# Patient Record
Sex: MALE | Race: WHITE
[De-identification: names, ages, dates, MRNs, and addresses within clinical notes are randomized per-mention and may not be internally consistent; named-entity substitution may affect disease eponyms.]

---

## 2017-03-01 PROBLEM — Z00.00 ENCOUNTER FOR PREVENTIVE HEALTH EXAMINATION: Status: ACTIVE | Noted: 2017-03-01

## 2017-03-15 ENCOUNTER — APPOINTMENT (OUTPATIENT)
Dept: OTOLARYNGOLOGY | Facility: CLINIC | Age: 82
End: 2017-03-15

## 2017-03-30 ENCOUNTER — OUTPATIENT (OUTPATIENT)
Dept: OUTPATIENT SERVICES | Facility: HOSPITAL | Age: 82
LOS: 1 days | Discharge: HOME | End: 2017-03-30

## 2017-06-27 DIAGNOSIS — I65.29 OCCLUSION AND STENOSIS OF UNSPECIFIED CAROTID ARTERY: ICD-10-CM

## 2017-08-24 ENCOUNTER — OUTPATIENT (OUTPATIENT)
Dept: OUTPATIENT SERVICES | Facility: HOSPITAL | Age: 82
LOS: 1 days | Discharge: HOME | End: 2017-08-24

## 2017-08-24 DIAGNOSIS — M67.431 GANGLION, RIGHT WRIST: ICD-10-CM

## 2017-12-05 ENCOUNTER — OUTPATIENT (OUTPATIENT)
Dept: OUTPATIENT SERVICES | Facility: HOSPITAL | Age: 82
LOS: 1 days | Discharge: HOME | End: 2017-12-05

## 2017-12-05 DIAGNOSIS — I10 ESSENTIAL (PRIMARY) HYPERTENSION: ICD-10-CM

## 2017-12-05 DIAGNOSIS — E06.3 AUTOIMMUNE THYROIDITIS: ICD-10-CM

## 2017-12-05 DIAGNOSIS — E78.2 MIXED HYPERLIPIDEMIA: ICD-10-CM

## 2019-02-15 ENCOUNTER — INPATIENT (INPATIENT)
Facility: HOSPITAL | Age: 84
LOS: 1 days | Discharge: HOME | End: 2019-02-17
Attending: HOSPITALIST | Admitting: HOSPITALIST
Payer: MEDICARE

## 2019-02-15 VITALS
HEART RATE: 82 BPM | TEMPERATURE: 97 F | RESPIRATION RATE: 16 BRPM | WEIGHT: 160.06 LBS | DIASTOLIC BLOOD PRESSURE: 61 MMHG | HEIGHT: 68 IN | OXYGEN SATURATION: 99 % | SYSTOLIC BLOOD PRESSURE: 133 MMHG

## 2019-02-15 DIAGNOSIS — K52.9 NONINFECTIVE GASTROENTERITIS AND COLITIS, UNSPECIFIED: ICD-10-CM

## 2019-02-15 DIAGNOSIS — E03.9 HYPOTHYROIDISM, UNSPECIFIED: ICD-10-CM

## 2019-02-15 DIAGNOSIS — A41.9 SEPSIS, UNSPECIFIED ORGANISM: ICD-10-CM

## 2019-02-15 DIAGNOSIS — N17.9 ACUTE KIDNEY FAILURE, UNSPECIFIED: ICD-10-CM

## 2019-02-15 LAB
ALBUMIN SERPL ELPH-MCNC: 4.9 G/DL — SIGNIFICANT CHANGE UP (ref 3.5–5.2)
ALP SERPL-CCNC: 93 U/L — SIGNIFICANT CHANGE UP (ref 30–115)
ALT FLD-CCNC: 35 U/L — SIGNIFICANT CHANGE UP (ref 0–41)
ANION GAP SERPL CALC-SCNC: 17 MMOL/L — HIGH (ref 7–14)
AST SERPL-CCNC: 41 U/L — SIGNIFICANT CHANGE UP (ref 0–41)
BASOPHILS # BLD AUTO: 0.02 K/UL — SIGNIFICANT CHANGE UP (ref 0–0.2)
BASOPHILS NFR BLD AUTO: 0.2 % — SIGNIFICANT CHANGE UP (ref 0–1)
BILIRUB DIRECT SERPL-MCNC: <0.2 MG/DL — SIGNIFICANT CHANGE UP (ref 0–0.2)
BILIRUB INDIRECT FLD-MCNC: >0.6 MG/DL — SIGNIFICANT CHANGE UP (ref 0.2–1.2)
BILIRUB SERPL-MCNC: 0.8 MG/DL — SIGNIFICANT CHANGE UP (ref 0.2–1.2)
BUN SERPL-MCNC: 56 MG/DL — HIGH (ref 10–20)
CALCIUM SERPL-MCNC: 9.1 MG/DL — SIGNIFICANT CHANGE UP (ref 8.5–10.1)
CHLORIDE SERPL-SCNC: 95 MMOL/L — LOW (ref 98–110)
CO2 SERPL-SCNC: 24 MMOL/L — SIGNIFICANT CHANGE UP (ref 17–32)
CREAT SERPL-MCNC: 1.9 MG/DL — HIGH (ref 0.7–1.5)
EOSINOPHIL # BLD AUTO: 0.03 K/UL — SIGNIFICANT CHANGE UP (ref 0–0.7)
EOSINOPHIL NFR BLD AUTO: 0.2 % — SIGNIFICANT CHANGE UP (ref 0–8)
FLU A RESULT: NEGATIVE — SIGNIFICANT CHANGE UP
FLU A RESULT: NEGATIVE — SIGNIFICANT CHANGE UP
FLUAV AG NPH QL: NEGATIVE — SIGNIFICANT CHANGE UP
FLUBV AG NPH QL: NEGATIVE — SIGNIFICANT CHANGE UP
GLUCOSE SERPL-MCNC: 110 MG/DL — HIGH (ref 70–99)
HCT VFR BLD CALC: 44.5 % — SIGNIFICANT CHANGE UP (ref 42–52)
HGB BLD-MCNC: 14.7 G/DL — SIGNIFICANT CHANGE UP (ref 14–18)
IMM GRANULOCYTES NFR BLD AUTO: 0.5 % — HIGH (ref 0.1–0.3)
LACTATE SERPL-SCNC: 0.9 MMOL/L — SIGNIFICANT CHANGE UP (ref 0.5–2.2)
LACTATE SERPL-SCNC: 2.1 MMOL/L — SIGNIFICANT CHANGE UP (ref 0.5–2.2)
LIDOCAIN IGE QN: 26 U/L — SIGNIFICANT CHANGE UP (ref 7–60)
LYMPHOCYTES # BLD AUTO: 0.42 K/UL — LOW (ref 1.2–3.4)
LYMPHOCYTES # BLD AUTO: 3.5 % — LOW (ref 20.5–51.1)
MCHC RBC-ENTMCNC: 29.3 PG — SIGNIFICANT CHANGE UP (ref 27–31)
MCHC RBC-ENTMCNC: 33 G/DL — SIGNIFICANT CHANGE UP (ref 32–37)
MCV RBC AUTO: 88.6 FL — SIGNIFICANT CHANGE UP (ref 80–94)
MONOCYTES # BLD AUTO: 0.98 K/UL — HIGH (ref 0.1–0.6)
MONOCYTES NFR BLD AUTO: 8.2 % — SIGNIFICANT CHANGE UP (ref 1.7–9.3)
NEUTROPHILS # BLD AUTO: 10.5 K/UL — HIGH (ref 1.4–6.5)
NEUTROPHILS NFR BLD AUTO: 87.4 % — HIGH (ref 42.2–75.2)
NRBC # BLD: 0 /100 WBCS — SIGNIFICANT CHANGE UP (ref 0–0)
PLATELET # BLD AUTO: 231 K/UL — SIGNIFICANT CHANGE UP (ref 130–400)
POTASSIUM SERPL-MCNC: 4 MMOL/L — SIGNIFICANT CHANGE UP (ref 3.5–5)
POTASSIUM SERPL-SCNC: 4 MMOL/L — SIGNIFICANT CHANGE UP (ref 3.5–5)
PROT SERPL-MCNC: 7.8 G/DL — SIGNIFICANT CHANGE UP (ref 6–8)
RBC # BLD: 5.02 M/UL — SIGNIFICANT CHANGE UP (ref 4.7–6.1)
RBC # FLD: 13.9 % — SIGNIFICANT CHANGE UP (ref 11.5–14.5)
RSV RESULT: NEGATIVE — SIGNIFICANT CHANGE UP
RSV RNA RESP QL NAA+PROBE: NEGATIVE — SIGNIFICANT CHANGE UP
SODIUM SERPL-SCNC: 136 MMOL/L — SIGNIFICANT CHANGE UP (ref 135–146)
TROPONIN T SERPL-MCNC: <0.01 NG/ML — SIGNIFICANT CHANGE UP
WBC # BLD: 12.01 K/UL — HIGH (ref 4.8–10.8)
WBC # FLD AUTO: 12.01 K/UL — HIGH (ref 4.8–10.8)

## 2019-02-15 RX ORDER — METRONIDAZOLE 500 MG
500 TABLET ORAL ONCE
Qty: 0 | Refills: 0 | Status: COMPLETED | OUTPATIENT
Start: 2019-02-15 | End: 2019-02-15

## 2019-02-15 RX ORDER — METRONIDAZOLE 500 MG
500 TABLET ORAL EVERY 8 HOURS
Qty: 0 | Refills: 0 | Status: DISCONTINUED | OUTPATIENT
Start: 2019-02-15 | End: 2019-02-17

## 2019-02-15 RX ORDER — SODIUM CHLORIDE 9 MG/ML
1000 INJECTION INTRAMUSCULAR; INTRAVENOUS; SUBCUTANEOUS ONCE
Qty: 0 | Refills: 0 | Status: COMPLETED | OUTPATIENT
Start: 2019-02-15 | End: 2019-02-15

## 2019-02-15 RX ORDER — SODIUM CHLORIDE 9 MG/ML
1000 INJECTION INTRAMUSCULAR; INTRAVENOUS; SUBCUTANEOUS
Qty: 0 | Refills: 0 | Status: DISCONTINUED | OUTPATIENT
Start: 2019-02-15 | End: 2019-02-16

## 2019-02-15 RX ORDER — SODIUM CHLORIDE 9 MG/ML
1000 INJECTION, SOLUTION INTRAVENOUS
Qty: 0 | Refills: 0 | Status: DISCONTINUED | OUTPATIENT
Start: 2019-02-15 | End: 2019-02-17

## 2019-02-15 RX ORDER — ONDANSETRON 8 MG/1
4 TABLET, FILM COATED ORAL ONCE
Qty: 0 | Refills: 0 | Status: COMPLETED | OUTPATIENT
Start: 2019-02-15 | End: 2019-02-15

## 2019-02-15 RX ORDER — CIPROFLOXACIN LACTATE 400MG/40ML
400 VIAL (ML) INTRAVENOUS DAILY
Qty: 0 | Refills: 0 | Status: DISCONTINUED | OUTPATIENT
Start: 2019-02-15 | End: 2019-02-17

## 2019-02-15 RX ORDER — PANTOPRAZOLE SODIUM 20 MG/1
40 TABLET, DELAYED RELEASE ORAL DAILY
Qty: 0 | Refills: 0 | Status: DISCONTINUED | OUTPATIENT
Start: 2019-02-15 | End: 2019-02-16

## 2019-02-15 RX ORDER — ACETAMINOPHEN 500 MG
975 TABLET ORAL ONCE
Qty: 0 | Refills: 0 | Status: COMPLETED | OUTPATIENT
Start: 2019-02-15 | End: 2019-02-15

## 2019-02-15 RX ORDER — CIPROFLOXACIN LACTATE 400MG/40ML
400 VIAL (ML) INTRAVENOUS EVERY 12 HOURS
Qty: 0 | Refills: 0 | Status: DISCONTINUED | OUTPATIENT
Start: 2019-02-15 | End: 2019-02-15

## 2019-02-15 RX ORDER — CIPROFLOXACIN LACTATE 400MG/40ML
400 VIAL (ML) INTRAVENOUS ONCE
Qty: 0 | Refills: 0 | Status: COMPLETED | OUTPATIENT
Start: 2019-02-15 | End: 2019-02-15

## 2019-02-15 RX ORDER — PANTOPRAZOLE SODIUM 20 MG/1
40 TABLET, DELAYED RELEASE ORAL
Qty: 0 | Refills: 0 | Status: DISCONTINUED | OUTPATIENT
Start: 2019-02-15 | End: 2019-02-16

## 2019-02-15 RX ADMIN — Medication 200 MILLIGRAM(S): at 22:45

## 2019-02-15 RX ADMIN — SODIUM CHLORIDE 250 MILLILITER(S): 9 INJECTION INTRAMUSCULAR; INTRAVENOUS; SUBCUTANEOUS at 17:49

## 2019-02-15 RX ADMIN — Medication 100 MILLIGRAM(S): at 20:13

## 2019-02-15 RX ADMIN — SODIUM CHLORIDE 2000 MILLILITER(S): 9 INJECTION INTRAMUSCULAR; INTRAVENOUS; SUBCUTANEOUS at 20:13

## 2019-02-15 RX ADMIN — Medication 975 MILLIGRAM(S): at 17:49

## 2019-02-15 RX ADMIN — PANTOPRAZOLE SODIUM 40 MILLIGRAM(S): 20 TABLET, DELAYED RELEASE ORAL at 23:03

## 2019-02-15 NOTE — ED PROVIDER NOTE - CARE PLAN
Principal Discharge DX:	Sepsis  Secondary Diagnosis:	Enteritis  Secondary Diagnosis:	EUGENE (acute kidney injury)

## 2019-02-15 NOTE — H&P ADULT - NSHPREVIEWOFSYSTEMS_GEN_ALL_CORE
· Review of Systems: CONSTITUTIONAL: (-) fevers, (-) chills, (-) fatigue, (+) decreased appetite  	EYES: (-) eye redness, (-) eye discharge  	ENT: (-) congestion, (-) rhinorrhea, (-) sore throat  	NECK: (-) neck pain, (-) lymphadenopathy  	CARDIO: (-) chest pain, (-) palpitations, (-) edema  	PULM: (-) cough, (-) sputum, (-) shortness of breath  	GI: see HPI  	: (-) dysuria, (-) hematuria, (-) frequency, (-) urgency, (-) incontinence, (-) difficulty urinating, (-) urinary retention, (-) flank pain  	MSK: (-) back pain, (-) myalgias, (-) gait difficulty  	SKIN: (-) rashes, (-) pallor, (-) jaundice  NEURO: (-) headache, (-) dizziness, (-) lightheadedness, (-) syncope, (+) generalized weakness

## 2019-02-15 NOTE — ED ADULT NURSE NOTE - NS ED NOTE  TALK SOMEONE YN
Take shower after playing outside  Zyrtec 10 mg every day  flonase 1 spray in each side/day after blowing nose  zaditor 1 drop in each 2x/day as needed  Shower after playing outside  Keep bedroom window closed  No ceiling fan  F/u in 2 weeks No

## 2019-02-15 NOTE — ED ADULT NURSE NOTE - CHIEF COMPLAINT QUOTE
BIBA via [facility] from home, pt states, "I think it started off with a virus, I lost control of my legs and I'm pooping my pants, I didn't fall but I let my self easy on the floor because I couldn't get on the bed." NO Head injury, NO LOC, complaining of generalized weakness that began yesterday, and diarrhea x 2 days.

## 2019-02-15 NOTE — ED ADULT TRIAGE NOTE - CHIEF COMPLAINT QUOTE
BIBA via [facility] from home, pt states, "I think it started off with a virus, I lost control of my legs and I'm pooping my pants, I didn't fall but I let my self easy on the floor because I couldn't get on the bed." NO Head injury, NO LOC, complaining of generalized weakness that began yesterday, and diarrhea x 2 days.

## 2019-02-15 NOTE — ED PROVIDER NOTE - OBJECTIVE STATEMENT
85 year old male w hx of HTN, HLD, cholecystectomy presents to the ED with generalized weakness, nonbloody nonbilious emesis and watery nonbloody diarrhea for 2 days. Pt has had up to 5 episodes of diarrhea a day and two episodes of vomiting yesterday. today patient was planning on going to an urgent care for evaluation but felt too weak to get dressed, thus prompting his wife to call EMS. Denies fevers/chills, melena, hematochezia, decreased urine output, dysuria, hematuria, chest pain, cough, shortness of breath, back pain, flank pain. denies recent travel, hospitalizations, or antibiotics.

## 2019-02-15 NOTE — ED PROVIDER NOTE - CLINICAL SUMMARY MEDICAL DECISION MAKING FREE TEXT BOX
vomiting and diarrhea - neg ct - sepsis critiera - hydration and repeat lact sent - admitted and abx given

## 2019-02-15 NOTE — ED PROVIDER NOTE - PHYSICAL EXAMINATION
VITALS:  I have reviewed the initial vital signs.  GENERAL: Well-developed, well-nourished, in no acute distress. Feels warm to the touch.  HEENT: Sclera clear. Conjunctiva pink. EOMI, PERRLA. Mucous membranes moist.  NECK: supple w FROM. No cervical adenopathy.  CARDIO: RRR, nl S1 and S2. No murmurs, rubs, or gallops. 2+ radial pulses bilaterally.  PULM: Normal effort. CTA b/l without wheezes, rales, or rhonchi.  MSK: extremities x4 w FROM.  GI: Normal bowel sounds. Abdomen soft and non-distended. Nontender in all four quadrants without rebound or guarding. No pulsatile masses.  : No CVA tenderness b/l.  SKIN: Warm, dry. Decreased skin turgor. Capillary refill <2 seconds.  NEURO: A&Ox3. Speech clear. Moving all four extremities equally throughout.

## 2019-02-15 NOTE — H&P ADULT - NSHPLABSRESULTS_GEN_ALL_CORE
14.7   12.01 )-----------( 231      ( 15 Feb 2019 17:30 )             44.5     02-15    136  |  95<L>  |  56<H>  ----------------------------<  110<H>  4.0   |  24  |  1.9<H>    Ca    9.1      15 Feb 2019 17:30    TPro  7.8  /  Alb  4.9  /  TBili  0.8  /  DBili  <0.2  /  AST  41  /  ALT  35  /  AlkPhos  93  02-15              Lactate Trend  02-15 @ 19:40 Lactate:0.9   02-15 @ 17:30 Lactate:2.1     CARDIAC MARKERS ( 15 Feb 2019 17:30 )  x     / <0.01 ng/mL / x     / x     / x          CAPILLARY BLOOD GLUCOSE

## 2019-02-15 NOTE — ED PROVIDER NOTE - ATTENDING CONTRIBUTION TO CARE
Pt is a 84yo male with 3d of nonbloody vomiting and diarrhea.  No fever, no travel, no sick contacts, no new foods.  No abdominal pain.  No other complaints.  Today was feeling very weak and couldn't get out of bed.    Exam: pink conjucntivae, soft NT abdomen, cap refill =2s, RRR, CTAB  Imp: gastroenteritis, r/o other pathology  Plan: labs, ua, ct

## 2019-02-15 NOTE — H&P ADULT - HISTORY OF PRESENT ILLNESS
· Chief Complaint: The patient is a 85y Male complaining of weakness.	  · HPI Objective Statement: 85 year old male w hx of HTN, HLD, cholecystectomy presents to the ED with generalized weakness, nonbloody nonbilious emesis and watery nonbloody diarrhea for 2 days. Pt has had up to 5 episodes of diarrhea a day and two episodes of vomiting yesterday. today patient was planning on going to an urgent care for evaluation but felt too weak to get dressed, thus prompting his wife to call EMS. Denies fevers/chills, melena, hematochezia, decreased urine output, dysuria, hematuria, chest pain, cough, shortness of breath, back pain, flank pain. denies recent travel, hospitalizations, or antibiotics.

## 2019-02-15 NOTE — ED PROVIDER NOTE - NS ED ROS FT
CONSTITUTIONAL: (-) fevers, (-) chills, (-) fatigue, (+) decreased appetite  EYES: (-) eye redness, (-) eye discharge  ENT: (-) congestion, (-) rhinorrhea, (-) sore throat  NECK: (-) neck pain, (-) lymphadenopathy  CARDIO: (-) chest pain, (-) palpitations, (-) edema  PULM: (-) cough, (-) sputum, (-) shortness of breath  GI: see HPI  : (-) dysuria, (-) hematuria, (-) frequency, (-) urgency, (-) incontinence, (-) difficulty urinating, (-) urinary retention, (-) flank pain  MSK: (-) back pain, (-) myalgias, (-) gait difficulty  SKIN: (-) rashes, (-) pallor, (-) jaundice  NEURO: (-) headache, (-) dizziness, (-) lightheadedness, (-) syncope, (+) generalized weakness

## 2019-02-15 NOTE — H&P ADULT - ASSESSMENT
Patient is a 85y old  Male who presents with a chief complaint of                                                                                                                                                                                                                                                                                      HEALTH ISSUES - PROBLEM Dx: Patient is a 85y old  Male who presents with a chief complaint of    nausea/ vomitting / diarrhea                                                                                                                                                                                                                                                                                    HEALTH ISSUES - PROBLEM Dx:gastritis/  enteritis

## 2019-02-16 DIAGNOSIS — J94.8 OTHER SPECIFIED PLEURAL CONDITIONS: ICD-10-CM

## 2019-02-16 DIAGNOSIS — M48.50XA COLLAPSED VERTEBRA, NOT ELSEWHERE CLASSIFIED, SITE UNSPECIFIED, INITIAL ENCOUNTER FOR FRACTURE: ICD-10-CM

## 2019-02-16 DIAGNOSIS — K56.600 PARTIAL INTESTINAL OBSTRUCTION, UNSPECIFIED AS TO CAUSE: ICD-10-CM

## 2019-02-16 DIAGNOSIS — E03.9 HYPOTHYROIDISM, UNSPECIFIED: ICD-10-CM

## 2019-02-16 LAB
ANION GAP SERPL CALC-SCNC: 10 MMOL/L — SIGNIFICANT CHANGE UP (ref 7–14)
ANION GAP SERPL CALC-SCNC: 8 MMOL/L — SIGNIFICANT CHANGE UP (ref 7–14)
APPEARANCE UR: CLEAR — SIGNIFICANT CHANGE UP
BACTERIA # UR AUTO: ABNORMAL
BILIRUB UR-MCNC: NEGATIVE — SIGNIFICANT CHANGE UP
BUN SERPL-MCNC: 36 MG/DL — HIGH (ref 10–20)
BUN SERPL-MCNC: 41 MG/DL — HIGH (ref 10–20)
C DIFF BY PCR RESULT: NEGATIVE — SIGNIFICANT CHANGE UP
C DIFF TOX GENS STL QL NAA+PROBE: SIGNIFICANT CHANGE UP
CALCIUM SERPL-MCNC: 7.7 MG/DL — LOW (ref 8.5–10.1)
CALCIUM SERPL-MCNC: 7.7 MG/DL — LOW (ref 8.5–10.1)
CHLORIDE SERPL-SCNC: 103 MMOL/L — SIGNIFICANT CHANGE UP (ref 98–110)
CHLORIDE SERPL-SCNC: 104 MMOL/L — SIGNIFICANT CHANGE UP (ref 98–110)
CO2 SERPL-SCNC: 23 MMOL/L — SIGNIFICANT CHANGE UP (ref 17–32)
CO2 SERPL-SCNC: 23 MMOL/L — SIGNIFICANT CHANGE UP (ref 17–32)
COLOR SPEC: YELLOW — SIGNIFICANT CHANGE UP
CREAT SERPL-MCNC: 1.5 MG/DL — SIGNIFICANT CHANGE UP (ref 0.7–1.5)
CREAT SERPL-MCNC: 1.5 MG/DL — SIGNIFICANT CHANGE UP (ref 0.7–1.5)
DIFF PNL FLD: NEGATIVE — SIGNIFICANT CHANGE UP
GLUCOSE SERPL-MCNC: 85 MG/DL — SIGNIFICANT CHANGE UP (ref 70–99)
GLUCOSE SERPL-MCNC: 93 MG/DL — SIGNIFICANT CHANGE UP (ref 70–99)
GLUCOSE UR QL: NEGATIVE MG/DL — SIGNIFICANT CHANGE UP
GRAN CASTS # UR COMP ASSIST: ABNORMAL /LPF
HCT VFR BLD CALC: 36.4 % — LOW (ref 42–52)
HCT VFR BLD CALC: 38.2 % — LOW (ref 42–52)
HGB BLD-MCNC: 12 G/DL — LOW (ref 14–18)
HGB BLD-MCNC: 12.6 G/DL — LOW (ref 14–18)
KETONES UR-MCNC: NEGATIVE — SIGNIFICANT CHANGE UP
LEUKOCYTE ESTERASE UR-ACNC: NEGATIVE — SIGNIFICANT CHANGE UP
MCHC RBC-ENTMCNC: 29.3 PG — SIGNIFICANT CHANGE UP (ref 27–31)
MCHC RBC-ENTMCNC: 29.6 PG — SIGNIFICANT CHANGE UP (ref 27–31)
MCHC RBC-ENTMCNC: 33 G/DL — SIGNIFICANT CHANGE UP (ref 32–37)
MCHC RBC-ENTMCNC: 33 G/DL — SIGNIFICANT CHANGE UP (ref 32–37)
MCV RBC AUTO: 88.8 FL — SIGNIFICANT CHANGE UP (ref 80–94)
MCV RBC AUTO: 89.7 FL — SIGNIFICANT CHANGE UP (ref 80–94)
NITRITE UR-MCNC: NEGATIVE — SIGNIFICANT CHANGE UP
NRBC # BLD: 0 /100 WBCS — SIGNIFICANT CHANGE UP (ref 0–0)
NRBC # BLD: 0 /100 WBCS — SIGNIFICANT CHANGE UP (ref 0–0)
PH UR: 6 — SIGNIFICANT CHANGE UP (ref 5–8)
PLATELET # BLD AUTO: 158 K/UL — SIGNIFICANT CHANGE UP (ref 130–400)
PLATELET # BLD AUTO: 202 K/UL — SIGNIFICANT CHANGE UP (ref 130–400)
POTASSIUM SERPL-MCNC: 4.1 MMOL/L — SIGNIFICANT CHANGE UP (ref 3.5–5)
POTASSIUM SERPL-MCNC: 4.4 MMOL/L — SIGNIFICANT CHANGE UP (ref 3.5–5)
POTASSIUM SERPL-SCNC: 4.1 MMOL/L — SIGNIFICANT CHANGE UP (ref 3.5–5)
POTASSIUM SERPL-SCNC: 4.4 MMOL/L — SIGNIFICANT CHANGE UP (ref 3.5–5)
PROT UR-MCNC: ABNORMAL MG/DL
RBC # BLD: 4.1 M/UL — LOW (ref 4.7–6.1)
RBC # BLD: 4.26 M/UL — LOW (ref 4.7–6.1)
RBC # FLD: 14.2 % — SIGNIFICANT CHANGE UP (ref 11.5–14.5)
RBC # FLD: 14.2 % — SIGNIFICANT CHANGE UP (ref 11.5–14.5)
SODIUM SERPL-SCNC: 135 MMOL/L — SIGNIFICANT CHANGE UP (ref 135–146)
SODIUM SERPL-SCNC: 136 MMOL/L — SIGNIFICANT CHANGE UP (ref 135–146)
SP GR SPEC: 1.01 — SIGNIFICANT CHANGE UP (ref 1.01–1.03)
UROBILINOGEN FLD QL: 0.2 MG/DL — SIGNIFICANT CHANGE UP (ref 0.2–0.2)
WBC # BLD: 5.33 K/UL — SIGNIFICANT CHANGE UP (ref 4.8–10.8)
WBC # BLD: 5.99 K/UL — SIGNIFICANT CHANGE UP (ref 4.8–10.8)
WBC # FLD AUTO: 5.33 K/UL — SIGNIFICANT CHANGE UP (ref 4.8–10.8)
WBC # FLD AUTO: 5.99 K/UL — SIGNIFICANT CHANGE UP (ref 4.8–10.8)
WBC UR QL: SIGNIFICANT CHANGE UP /HPF

## 2019-02-16 PROCEDURE — 99221 1ST HOSP IP/OBS SF/LOW 40: CPT

## 2019-02-16 RX ORDER — FAMOTIDINE 10 MG/ML
20 INJECTION INTRAVENOUS DAILY
Qty: 0 | Refills: 0 | Status: DISCONTINUED | OUTPATIENT
Start: 2019-02-16 | End: 2019-02-17

## 2019-02-16 RX ADMIN — Medication 100 MILLIGRAM(S): at 21:08

## 2019-02-16 RX ADMIN — Medication 200 MILLIGRAM(S): at 13:41

## 2019-02-16 RX ADMIN — SODIUM CHLORIDE 100 MILLILITER(S): 9 INJECTION, SOLUTION INTRAVENOUS at 00:02

## 2019-02-16 RX ADMIN — Medication 100 MILLIGRAM(S): at 06:12

## 2019-02-16 RX ADMIN — Medication 30 MILLILITER(S): at 00:01

## 2019-02-16 RX ADMIN — FAMOTIDINE 20 MILLIGRAM(S): 10 INJECTION INTRAVENOUS at 11:38

## 2019-02-16 RX ADMIN — SODIUM CHLORIDE 100 MILLILITER(S): 9 INJECTION, SOLUTION INTRAVENOUS at 09:24

## 2019-02-16 RX ADMIN — Medication 30 MILLILITER(S): at 11:38

## 2019-02-16 RX ADMIN — Medication 100 MILLIGRAM(S): at 13:41

## 2019-02-16 RX ADMIN — Medication 30 MILLILITER(S): at 17:42

## 2019-02-16 NOTE — CONSULT NOTE ADULT - ASSESSMENT
86 y/o male, admitted with diarrhea and generalized weakness, found to have EUGENE    EUGENE     Improving, Cr=1.5 from 1.9 mg/dl    Possibly related to volume depletion and relative hypotension  Enteritis possible viral  Hypothyroidism    Renal plan:  Monitor BP  Continue IV fluids with D5NS 100 cc/hr  Obtain U/Prot/Cr  TSH  Monitor BUN/Cr  Correct lytes as needed  Monitor Is and Os  Continue general care as per medicine  Management explained to the pt  Thank you  Will f/u

## 2019-02-16 NOTE — PROGRESS NOTE ADULT - SUBJECTIVE AND OBJECTIVE BOX
Pt seen and examined at bedside. Reports appetite, however continues to have diarrhea.     VITAL SIGNS (Last 24 hrs):  T(C): 36.4 (02-16-19 @ 05:14), Max: 36.4 (02-16-19 @ 05:14)  HR: 66 (02-16-19 @ 05:14) (66 - 82)  BP: 103/50 (02-16-19 @ 05:14) (94/45 - 133/61)  RR: 16 (02-16-19 @ 05:14) (16 - 17)  SpO2: 95% (02-15-19 @ 21:27) (95% - 99%)  Wt(kg): --  Daily Height in cm: 172.72 (15 Feb 2019 21:30)    Daily     I&O's Summary      PHYSICAL EXAM:  GENERAL: NAD   HEAD:  Atraumatic, Normocephalic  EYES: EOMI, PERRLA, conjunctiva and sclera clear  NECK: Supple, No JVD  CHEST/LUNG: Clear to auscultation bilaterally; No wheeze  HEART: Regular rate and rhythm; No murmurs, rubs, or gallops  ABDOMEN: Soft, Nontender, Nondistended; Bowel sounds present  EXTREMITIES:  2+ Peripheral Pulses, No clubbing, cyanosis, or edema  PSYCH: AAOx3  NEUROLOGY: non-focal  SKIN: No rashes or lesions    Labs Reviewed  Spoke to patient in regards to abnormal labs.    CBC Full  -  ( 16 Feb 2019 08:10 )  WBC Count : 5.33 K/uL  Hemoglobin : 12.0 g/dL  Hematocrit : 36.4 %  Platelet Count - Automated : 158 K/uL  Mean Cell Volume : 88.8 fL  Mean Cell Hemoglobin : 29.3 pg  Mean Cell Hemoglobin Concentration : 33.0 g/dL  Auto Neutrophil # : x  Auto Lymphocyte # : x  Auto Monocyte # : x  Auto Eosinophil # : x  Auto Basophil # : x  Auto Neutrophil % : x  Auto Lymphocyte % : x  Auto Monocyte % : x  Auto Eosinophil % : x  Auto Basophil % : x    BMP:    02-15 @ 17:30    Blood Urea Nitrogen - 56  Calcium - 9.1  Car[place] Dioxide - 24  Chloride - 95  Creatinine - 1.9  Glucose - 110  Potassium - 4.0  Sodium - 136       MEDICATIONS  (STANDING):  ciprofloxacin   IVPB 400 milliGRAM(s) IV Intermittent daily  dextrose 5% + sodium chloride 0.9%. 1000 milliLiter(s) (100 mL/Hr) IV Continuous <Continuous>  metroNIDAZOLE  IVPB 500 milliGRAM(s) IV Intermittent every 8 hours  pantoprazole  Injectable 40 milliGRAM(s) IV Push daily    MEDICATIONS  (PRN):  aluminum hydroxide/magnesium hydroxide/simethicone Suspension 30 milliLiter(s) Oral every 4 hours PRN Dyspepsia

## 2019-02-16 NOTE — PROGRESS NOTE ADULT - ASSESSMENT
Patient is a 85y old  Male who presents with a chief complaint of nausea/ vomiting / diarrhea                                                                                                                                                                                                                                                                                    HEALTH ISSUES - PROBLEM Dx:gastritis/  enteritis

## 2019-02-16 NOTE — CONSULT NOTE ADULT - SUBJECTIVE AND OBJECTIVE BOX
· HPI Objective Statement: 85 year old male w hx of HTN, HLD, cholecystectomy presents to the ED with generalized weakness, nonbloody nonbilious emesis and watery nonbloody diarrhea for 2 days. Pt has had up to 5 episodes of diarrhea a day and two episodes of vomiting yesterday. today patient was planning on going to an urgent care for evaluation but felt too weak to get dressed, thus prompting his wife to call EMS. Denies fevers/chills, melena, hematochezia, decreased urine output, dysuria, hematuria, chest pain, cough, shortness of breath, back pain, flank pain. denies recent travel, hospitalizations, or antibiotics.	    PAST MEDICAL/SURGICAL/FAMILY/SOCIAL HISTORY:    Past Medical History:  Hypothyroidism.     Tobacco Usage:  · Tobacco Usage	Unknown if ever smoked	    · Attestation Comment: I have reviewed and confirmed nurses' notes for patient's medications, allergies, medical history, and surgical history.	    ALLERGIES AND HOME MEDICATIONS:   Allergies:        Allergies:  	No Known Allergies:     Home Medications:   * Patient Currently Takes Medications as of 15-Feb-2019 17:12 documented in Structured Notes  · 	[place] Thyroid 60 mg oral tablet: Last Dose Taken:  , 1 tab(s) orally once a day  · 	pantoprazole 40 mg oral delayed release tablet: Last Dose Taken:  , 1 tab(s) orally once a day    REVIEW OF SYSTEMS:    Review of Systems:  · Review of Systems: CONSTITUTIONAL: (-) fevers, (-) chills, (-) fatigue, (+) decreased appetite  EYES: (-) eye redness, (-) eye discharge  ENT: (-) congestion, (-) rhinorrhea, (-) sore throat  NECK: (-) neck pain, (-) lymphadenopathy  CARDIO: (-) chest pain, (-) palpitations, (-) edema  PULM: (-) cough, (-) sputum, (-) shortness of breath  GI: see HPI  : (-) dysuria, (-) hematuria, (-) frequency, (-) urgency, (-) incontinence, (-) difficulty urinating, (-) urinary retention, (-) flank pain  MSK: (-) back pain, (-) myalgias, (-) gait difficulty  SKIN: (-) rashes, (-) pallor, (-) jaundice NEURO: (-) headache, (-) dizziness, (-) lightheadedness, (-) syncope, (+) generalized weakness	    PHYSICAL EXAM:   · Physical Examination: VITALS:  I have reviewed the initial vital signs.  GENERAL: Well-developed, well-nourished, in no acute distress. Feels warm to the touch.  HEENT: Sclera clear. Conjunctiva pink. EOMI, PERRLA. Mucous membranes moist.  NECK: supple w FROM. No cervical adenopathy.  CARDIO: RRR, nl S1 and S2. No murmurs, rubs, or gallops. 2+ radial pulses bilaterally.  PULM: Normal effort. CTA b/l without wheezes, rales, or rhonchi.  MSK: extremities x4 w FROM.  GI: Normal bowel sounds. Abdomen soft and non-distended. mild diffuse tenderness, without rebound or guarding. No pulsatile masses.  : No CVA tenderness b/l.  SKIN: Warm, dry. Decreased skin turgor. Capillary refill <2 seconds. NEURO: A&Ox3. Speech clear. Moving all four extremities equally throughout.	        RESULTS:   · EKG Date/Time: 15-Feb-2019 17:39	  · Rate: 88	  · Interpretation: normal sinus rhythm	  · Other Findings: no gross signs of ischemia	                        14.7   12.01 )-----------( 231      ( 15 Feb 2019 17:30 )             44.5   02-15    136  |  95<L>  |  56<H>  ----------------------------<  110<H>  4.0   |  24  |  1.9<H>    Ca    9.1      15 Feb 2019 17:30    TPro  7.8  /  Alb  4.9  /  TBili  0.8  /  DBili  <0.2  /  AST  41  /  ALT  35  /  AlkPhos  93  02-15    EXAM:  CT ABDOMEN AND PELVIS IC            PROCEDURE DATE:  02/15/2019            INTERPRETATION:  CLINICAL STATEMENT: Abdominal pain.      TECHNIQUE: Contiguous axial CT images were obtained from the lower chest   to the pubic symphysis following administration of 100cc Optiray 320   intravenous contrast.  Oral contrast was not administered.  Reformatted   images in the coronal and sagittal planes were acquired.    COMPARISON CT: CT chest abdomen pelvis dated February 16, 2013.    OTHER STUDIES USED FOR CORRELATION: None.       FINDINGS:    LOWER CHEST: Bilateral calcified pleural plaques, indicative of prior   asbestos exposure. Atherosclerotic calcifications of the coronary   arteries noted.    HEPATOBILIARY: Unremarkable.    SPLEEN: Unremarkable.    PANCREAS: Unremarkable.    ADRENAL GLANDS: Unremarkable.    KIDNEYS: Symmetric renal enhancement. No hydronephrosis.    ABDOMINOPELVIC NODES: No lymphadenopathy.    PELVIC ORGANS: Enlarged prostate gland.    PERITONEUM/MESENTERY/BOWEL: Numerous fluid-filled mildly dilated small   bowel loops seen throughout the abdomen, measuring up to 3 cm without a   definite transition point. Few mildly thickened small bowel seen in the   left hemiabdomen (series 2, image 33). Mild mesenteric hyperemia and few   scattered subcentimeter mesenteric lymph nodes noted. Fluid-filled colon   also noted. No ascites or pneumoperitoneum. Normal appendix    BONES/SOFT TISSUES: Chronic compression deformity of L1 vertebral body.    OTHER: Atheroscleroticvascular calcifications.      IMPRESSION:   1.  Few mildly thickened small bowel loop in the left hemiabdomen with   mild mesenteric hyperemia, suggestive of enteritis.    2.  Numerous mildly dilated fluid-filled small bowel loops without a   definite transition point. Findings may be secondary to the enteritis   versus less likely partial small bowel obstruction.                  MORENA MARIA M.D., ATTENDING RADIOLOGIST  This document has been electronically signed. Feb 15 2019  7:19PM

## 2019-02-16 NOTE — CONSULT NOTE ADULT - SUBJECTIVE AND OBJECTIVE BOX
NEPHROLOGY CONSULTATION NOTE    	    85 year old male w hx of HTN, HLD, cholecystectomy presented to ER with generalized weakness, nonbloody nonbilious emesis and watery nonbloody diarrhea for 2 days. Pt has had up to 5 episodes of diarrhea a day and two episodes of vomiting yesterday. today patient was planning on going to an urgent care for evaluation but felt too weak to get dressed, thus prompting his wife to call EMS. Denies fevers/chills, melena, hematochezia, decreased urine output, dysuria, hematuria, chest pain, cough, shortness of breath, back pain, flank pain. denies recent travel, hospitalizations, or antibiotics.	    Pt was found to have EUGENE with Cr=1.9 mg/dl, so renal was consulted    PAST MEDICAL & SURGICAL HISTORY:  Hypothyroidism    Allergies:  No Known Allergies    Home Medications Reviewed  Hospital Medications:   MEDICATIONS  (STANDING):  ciprofloxacin   IVPB 400 milliGRAM(s) IV Intermittent daily  dextrose 5% + sodium chloride 0.9%. 1000 milliLiter(s) (100 mL/Hr) IV Continuous <Continuous>  famotidine    Tablet 20 milliGRAM(s) Oral daily  metroNIDAZOLE  IVPB 500 milliGRAM(s) IV Intermittent every 8 hours      SOCIAL HISTORY:  Denies ETOH, Smoking,   FAMILY HISTORY: not contributory         REVIEW OF SYSTEMS:  CONSTITUTIONAL: weakness improving, no fevers or chills  EYES/ENT: No visual changes;  No vertigo or throat pain   NECK: No pain or stiffness  RESPIRATORY: No cough, wheezing, hemoptysis; No shortness of breath  CARDIOVASCULAR: No chest pain or palpitations.  GASTROINTESTINAL: No abdominal or epigastric pain. No nausea, vomiting, or hematemesis; Diarrhea resolving. No melena or hematochezia.  GENITOURINARY: No dysuria, frequency, foamy urine, urinary urgency, incontinence or hematuria  NEUROLOGICAL: No numbness or focal weakness  SKIN: No itching, burning, rashes, or lesions   VASCULAR: No bilateral lower extremity edema.   All other review of systems is negative unless indicated above.    VITALS:  T(F): 96.1 (19 @ 14:00), Max: 97.5 (19 @ 05:14)  HR: 64 (19 @ 14:00)  BP: 108/59 (19 @ 14:00)  RR: 16 (19 @ 14:00)  SpO2: 95% (02-15-19 @ 21:27)    Height (cm): 172.72 (02-15 @ 21:30)  Weight (kg): 73.8 (02-15 @ 21:30)  BMI (kg/m2): 24.7 (02-15 @ 21:30)  BSA (m2): 1.87 (02-15 @ 21:30)    I&O's Detail        PHYSICAL EXAM:  Constitutional: NAD  HEENT: anicteric sclera, oropharynx clear, MMM  Neck: No JVD  Respiratory: CTAB, no wheezes, rales or rhonchi  Cardiovascular: S1, S2, RRR  Gastrointestinal: BS+, soft, NT/ND  Extremities: No cyanosis or clubbing. No peripheral edema  Neurological: A/O x 3, no focal deficits  Psychiatric: Normal mood, normal affect  : No CVA tenderness.  Skin: No rashes    LABS:      136  |  103  |  36<H>  ----------------------------<  85  4.1   |  23  |  1.5    Ca    7.7<L>      2019 11:19    TPro  7.8  /  Alb  4.9  /  TBili  0.8  /  DBili  <0.2  /  AST  41  /  ALT  35  /  AlkPhos  93  15    Creatinine Trend: 1.5 <--, 1.5 <--, 1.9 <--                        12.6   5.99  )-----------( 202      ( 2019 11:19 )             38.2     Urine Studies:  Urinalysis Basic - ( 2019 10:30 )    Color: Yellow / Appearance: Clear / S.015 / pH:   Gluc:  / Ketone: Negative  / Bili: Negative / Urobili: 0.2 mg/dL   Blood:  / Protein: Trace mg/dL / Nitrite: Negative   Leuk Esterase: Negative / RBC:  / WBC 1-2 /HPF   Sq Epi:  / Non Sq Epi:  / Bacteria: Few                RADIOLOGY & ADDITIONAL STUDIES:

## 2019-02-17 ENCOUNTER — TRANSCRIPTION ENCOUNTER (OUTPATIENT)
Age: 84
End: 2019-02-17

## 2019-02-17 VITALS
SYSTOLIC BLOOD PRESSURE: 127 MMHG | HEART RATE: 60 BPM | DIASTOLIC BLOOD PRESSURE: 61 MMHG | RESPIRATION RATE: 16 BRPM | TEMPERATURE: 97 F

## 2019-02-17 LAB
ALBUMIN SERPL ELPH-MCNC: 3.8 G/DL — SIGNIFICANT CHANGE UP (ref 3.5–5.2)
ALP SERPL-CCNC: 67 U/L — SIGNIFICANT CHANGE UP (ref 30–115)
ALT FLD-CCNC: 28 U/L — SIGNIFICANT CHANGE UP (ref 0–41)
ANION GAP SERPL CALC-SCNC: 11 MMOL/L — SIGNIFICANT CHANGE UP (ref 7–14)
AST SERPL-CCNC: 41 U/L — SIGNIFICANT CHANGE UP (ref 0–41)
BILIRUB SERPL-MCNC: 0.5 MG/DL — SIGNIFICANT CHANGE UP (ref 0.2–1.2)
BUN SERPL-MCNC: 21 MG/DL — HIGH (ref 10–20)
CALCIUM SERPL-MCNC: 8.1 MG/DL — LOW (ref 8.5–10.1)
CHLORIDE SERPL-SCNC: 107 MMOL/L — SIGNIFICANT CHANGE UP (ref 98–110)
CO2 SERPL-SCNC: 24 MMOL/L — SIGNIFICANT CHANGE UP (ref 17–32)
CREAT SERPL-MCNC: 1.3 MG/DL — SIGNIFICANT CHANGE UP (ref 0.7–1.5)
CULTURE RESULTS: NO GROWTH — SIGNIFICANT CHANGE UP
GLUCOSE SERPL-MCNC: 100 MG/DL — HIGH (ref 70–99)
HCT VFR BLD CALC: 41.2 % — LOW (ref 42–52)
HGB BLD-MCNC: 13.5 G/DL — LOW (ref 14–18)
MAGNESIUM SERPL-MCNC: 2 MG/DL — SIGNIFICANT CHANGE UP (ref 1.8–2.4)
MCHC RBC-ENTMCNC: 29.3 PG — SIGNIFICANT CHANGE UP (ref 27–31)
MCHC RBC-ENTMCNC: 32.8 G/DL — SIGNIFICANT CHANGE UP (ref 32–37)
MCV RBC AUTO: 89.6 FL — SIGNIFICANT CHANGE UP (ref 80–94)
NRBC # BLD: 0 /100 WBCS — SIGNIFICANT CHANGE UP (ref 0–0)
PHOSPHATE SERPL-MCNC: 2.5 MG/DL — SIGNIFICANT CHANGE UP (ref 2.1–4.9)
PLATELET # BLD AUTO: 175 K/UL — SIGNIFICANT CHANGE UP (ref 130–400)
POTASSIUM SERPL-MCNC: 3.7 MMOL/L — SIGNIFICANT CHANGE UP (ref 3.5–5)
POTASSIUM SERPL-SCNC: 3.7 MMOL/L — SIGNIFICANT CHANGE UP (ref 3.5–5)
PROT SERPL-MCNC: 6 G/DL — SIGNIFICANT CHANGE UP (ref 6–8)
RBC # BLD: 4.6 M/UL — LOW (ref 4.7–6.1)
RBC # FLD: 14.2 % — SIGNIFICANT CHANGE UP (ref 11.5–14.5)
SODIUM SERPL-SCNC: 142 MMOL/L — SIGNIFICANT CHANGE UP (ref 135–146)
SPECIMEN SOURCE: SIGNIFICANT CHANGE UP
WBC # BLD: 5.1 K/UL — SIGNIFICANT CHANGE UP (ref 4.8–10.8)
WBC # FLD AUTO: 5.1 K/UL — SIGNIFICANT CHANGE UP (ref 4.8–10.8)

## 2019-02-17 RX ORDER — PANTOPRAZOLE SODIUM 20 MG/1
1 TABLET, DELAYED RELEASE ORAL
Qty: 0 | Refills: 0 | COMMUNITY

## 2019-02-17 RX ORDER — HEPARIN SODIUM 5000 [USP'U]/ML
5000 INJECTION INTRAVENOUS; SUBCUTANEOUS EVERY 12 HOURS
Qty: 0 | Refills: 0 | Status: DISCONTINUED | OUTPATIENT
Start: 2019-02-17 | End: 2019-02-17

## 2019-02-17 RX ADMIN — Medication 100 MILLIGRAM(S): at 05:17

## 2019-02-17 NOTE — DISCHARGE NOTE ADULT - HOSPITAL COURSE
Elderly male admitted for dehydration due to viral gastroenteritis. Patient's symptoms improved and his diet was advanced. He had formed stool. He is stable for discharge.

## 2019-02-17 NOTE — DISCHARGE NOTE ADULT - CARE PLAN
Principal Discharge DX:	Enteritis  Goal:	prevent recurrence  Assessment and plan of treatment:	viral gastroenteritis, negative for C. diff colitis   supportive care  Secondary Diagnosis:	EUGENE (acute kidney injury)  Assessment and plan of treatment:	due to dehydration, resolved with hydration

## 2019-02-17 NOTE — DISCHARGE NOTE ADULT - MEDICATION SUMMARY - MEDICATIONS TO TAKE
I will START or STAY ON the medications listed below when I get home from the hospital:    tamsulosin 0.4 mg oral capsule  -- 1 cap(s) by mouth once a day  -- Indication: For BPH    Zantac 150 oral tablet  -- 1 tab(s) by mouth 2 times a day  -- Indication: For GERD    [place] Thyroid 60 mg oral tablet  -- 1 tab(s) by mouth once a day  -- Indication: For Hypothyroidism

## 2019-02-17 NOTE — DISCHARGE NOTE ADULT - PATIENT PORTAL LINK FT
You can access the [facility], offered by [facility], by registering with the following website: [email or web address]

## 2019-02-17 NOTE — DISCHARGE NOTE ADULT - PLAN OF CARE
prevent recurrence viral gastroenteritis, negative for C. diff colitis   supportive care due to dehydration, resolved with hydration

## 2019-02-20 DIAGNOSIS — M48.56XA COLLAPSED VERTEBRA, NOT ELSEWHERE CLASSIFIED, LUMBAR REGION, INITIAL ENCOUNTER FOR FRACTURE: ICD-10-CM

## 2019-02-20 DIAGNOSIS — K21.9 GASTRO-ESOPHAGEAL REFLUX DISEASE WITHOUT ESOPHAGITIS: ICD-10-CM

## 2019-02-20 DIAGNOSIS — I10 ESSENTIAL (PRIMARY) HYPERTENSION: ICD-10-CM

## 2019-02-20 DIAGNOSIS — E78.5 HYPERLIPIDEMIA, UNSPECIFIED: ICD-10-CM

## 2019-02-20 DIAGNOSIS — J94.8 OTHER SPECIFIED PLEURAL CONDITIONS: ICD-10-CM

## 2019-02-20 DIAGNOSIS — A41.9 SEPSIS, UNSPECIFIED ORGANISM: ICD-10-CM

## 2019-02-20 DIAGNOSIS — A08.4 VIRAL INTESTINAL INFECTION, UNSPECIFIED: ICD-10-CM

## 2019-02-20 DIAGNOSIS — E03.9 HYPOTHYROIDISM, UNSPECIFIED: ICD-10-CM

## 2019-02-20 DIAGNOSIS — I95.9 HYPOTENSION, UNSPECIFIED: ICD-10-CM

## 2019-02-20 DIAGNOSIS — N17.9 ACUTE KIDNEY FAILURE, UNSPECIFIED: ICD-10-CM

## 2019-02-20 DIAGNOSIS — Z90.49 ACQUIRED ABSENCE OF OTHER SPECIFIED PARTS OF DIGESTIVE TRACT: ICD-10-CM

## 2019-02-20 DIAGNOSIS — N40.0 BENIGN PROSTATIC HYPERPLASIA WITHOUT LOWER URINARY TRACT SYMPTOMS: ICD-10-CM

## 2019-02-20 DIAGNOSIS — Z79.52 LONG TERM (CURRENT) USE OF SYSTEMIC STEROIDS: ICD-10-CM

## 2019-02-20 DIAGNOSIS — E86.0 DEHYDRATION: ICD-10-CM

## 2019-05-27 NOTE — PROGRESS NOTE ADULT - PROBLEM SELECTOR PLAN 2
Physical Therapy Med Surg Initial Assessment  Facility/Department: [facility]  Room: [place] [address]       NAME: Michelle Goncalves  : 1934 (80 y.o.)  MRN: [id number]  CODE STATUS: Full Code    Date of Service: 2019    Patient Diagnosis(es): Subdural hematoma [facility]) [D21.1D6J]   Chief Complaint   Patient presents with    Fall     on coumadin    Head Laceration     posterior     Patient Active Problem List    Diagnosis Date Noted    Prostate cancer ([address] 2019    Gait abnormality 2019    Dysphagia, oropharyngeal phase 2019    RBBB 2019    Aphasia 2019    Subdural hematoma ([address] 2019    Anatomical narrow angle, bilateral 2019    Closed compression fracture of L1 lumbar vertebra ([address] 2018    Facet arthropathy, lumbosacral 2018    Spondylolisthesis of lumbar region 2018    Memory loss 2018    Afib ([address] 2018    Prediabetes 2018    Long-term use of aspirin therapy 2017    Angina pectoris ([address] 2017    Benign prostatic hyperplasia with urinary frequency 2017    Gross hematuria 2017    Vasculogenic erectile dysfunction 2017    Long term current use of anticoagulant 10/01/2015    Vitamin D deficiency 2014    Essential hypertension 2012    Hyperlipidemia 2012    S/P CABG (coronary artery bypass graft) 2012    S/P PTCA (percutaneous transluminal coronary angioplasty) 2012    Coronary atherosclerosis 2012    SHAYY on CPAP 2012        Past Medical History:   Diagnosis Date    Chronic back pain     Hyperlipidemia     Hypertension      Past Surgical History:   Procedure Laterality Date    CARDIAC CATHETERIZATION      3 stents    CORONARY ARTERY BYPASS GRAFT              Restrictions:  Restrictions/Precautions: Swallowing - Thickened Liquids, Seizure(nectar)     SUBJECTIVE:         Pre and Post Treatment Pain Screenin/10       Prior Level of Function:  Social/Functional History  Lives With: Alone  Home Layout: One level  Home Access: Stairs to enter with rails  Entrance Stairs - Number of Steps: 4  ADL Assistance: Independent  Homemaking Assistance: Independent  Ambulation Assistance: Independent  Transfer Assistance: Independent  Occupation: Retired  Type of occupation:     OBJECTIVE:   Vision/Hearing:  Vision: Within [address]: Within functional limits    Cognition:  Overall Orientation Status: Impaired  Orientation Level: Oriented to person, Disoriented to situation, Disoriented to time, Disoriented to place    Overall Cognitive Status: Exceptions  Arousal/Alertness: Delayed responses to stimuli  Following Commands: Inconsistently follows commands  Attention Span: Attends with cues to redirect  Memory: Decreased short term memory  Safety Judgement: Decreased awareness of need for safety, Decreased awareness of need for assistance  Insights: Not aware of deficits  Initiation: Requires cues for some  Sequencing: Requires cues for all       ROM:  RLE PROM: WFL  LLE PROM: WFL    Strength:  Strength RLE  Comment: 3/5  Strength LLE  Comment: 3/5    Neuro:  Balance  Posture: Poor  Sitting - Static: Poor  Sitting - Dynamic: Poor  Standing - Static: Poor  Standing - Dynamic: Poor  Comments: pt with post LOB in sitting and standing without ability to correct - +1 assist reqired in sitting and +2 assist in standing required     Motor Control  Gross Motor?: (rigidity noted in trunk and extrmeities limiting pts active mobility and effecient movment; pt also presents with LE ataxia)       Bed mobility  Rolling to Left: Maximum assistance  Rolling to Right: Maximum assistance  Supine to Sit: Maximum assistance  Sit to Supine: Maximum assistance;2 Person assistance  Comment: pts trunk and LE rigidity necessitates signfiance assist for mobility    Transfers  Sit to Stand: Maximum Assistance;2 Person Assistance  Stand to sit: Maximum Assistance;2 Person Assistance  Comment: poor coordination of LE's in LE weight bearing attempt    Ambulation  Ambulation?: No(pt able to standin only - significant post push noted without ability to correct. Weiht shift assist provided however unsafe to attempt taking steps with LOB)    Activity Tolerance  Activity Tolerance: Patient Tolerated treatment well  Activity Tolerance: Unsafe for pt to be up in chair this date          ASSESSMENT:   Body structures, Functions, Activity limitations: Decreased functional mobility ; Decreased safe awareness;Decreased balance;Decreased coordination;Decreased vision/visual deficit; Decreased endurance;Decreased strength  Decision Making: High Complexity  History: high  Exam: high  Clinical Presentation: high    Prognosis: Good  Patient Education: PT POC  Barriers to Learning: none    DISCHARGE RECOMMENDATIONS:  Discharge Recommendations: Continue to assess pending progress    Assessment: Pt presents with s/p craniotomy with deficits as above. He requires assistance with all mobility at this time. Pt reports he was previously indep and living alone.  Pt is in need of PT prior to discharge to home  REQUIRES PT FOLLOW UP: Yes      PLAN OF CARE:  Plan  Times per week: 3-6  Current Treatment Recommendations: Strengthening, Transfer Training, Endurance Training, Neuromuscular Re-education, Patient/Caregiver Education & Training, Equipment Evaluation, Education, & procurement, Gait Training, Balance Training, Safety Education & Training, Functional Mobility Training  Safety Devices  Type of devices: Bed alarm in place, Call light within reach, Left in bed    Goals:  Short term goals  Short term goal 1: min assist with bed mobility  Short term goal 2: mod assist sit to stand  Short term goal 3:  mod assist ait 15 feet with ww +2  Short term goal 4: pt able to sit EOB with SBA +1 5 min while completing LE ex program    [facility] (6 CLICK) BASIC MOBILITY  AM-PAC Inpatient Mobility Raw Score : 10     Therapy Time:   Individual   Time In 1300   Time Out 1330   Minutes Jose Walton [address] [place], [place], 05/27/19 at 1:40 PM c/w IVF

## 2019-10-29 ENCOUNTER — EMERGENCY (EMERGENCY)
Facility: HOSPITAL | Age: 84
LOS: 0 days | Discharge: HOME | End: 2019-10-29
Attending: EMERGENCY MEDICINE | Admitting: EMERGENCY MEDICINE
Payer: MEDICARE

## 2019-10-29 VITALS
RESPIRATION RATE: 18 BRPM | HEART RATE: 72 BPM | OXYGEN SATURATION: 97 % | TEMPERATURE: 97 F | WEIGHT: 160.06 LBS | DIASTOLIC BLOOD PRESSURE: 77 MMHG | SYSTOLIC BLOOD PRESSURE: 146 MMHG

## 2019-10-29 DIAGNOSIS — R42 DIZZINESS AND GIDDINESS: ICD-10-CM

## 2019-10-29 DIAGNOSIS — H81.10 BENIGN PAROXYSMAL VERTIGO, UNSPECIFIED EAR: ICD-10-CM

## 2019-10-29 DIAGNOSIS — H55.00 UNSPECIFIED NYSTAGMUS: ICD-10-CM

## 2019-10-29 DIAGNOSIS — Z90.49 ACQUIRED ABSENCE OF OTHER SPECIFIED PARTS OF DIGESTIVE TRACT: Chronic | ICD-10-CM

## 2019-10-29 DIAGNOSIS — E03.9 HYPOTHYROIDISM, UNSPECIFIED: ICD-10-CM

## 2019-10-29 LAB
ALBUMIN SERPL ELPH-MCNC: 4.2 G/DL — SIGNIFICANT CHANGE UP (ref 3.5–5.2)
ALP SERPL-CCNC: 78 U/L — SIGNIFICANT CHANGE UP (ref 30–115)
ALT FLD-CCNC: 16 U/L — SIGNIFICANT CHANGE UP (ref 0–41)
ANION GAP SERPL CALC-SCNC: 14 MMOL/L — SIGNIFICANT CHANGE UP (ref 7–14)
APPEARANCE UR: CLEAR — SIGNIFICANT CHANGE UP
AST SERPL-CCNC: 27 U/L — SIGNIFICANT CHANGE UP (ref 0–41)
BILIRUB SERPL-MCNC: 0.6 MG/DL — SIGNIFICANT CHANGE UP (ref 0.2–1.2)
BILIRUB UR-MCNC: NEGATIVE — SIGNIFICANT CHANGE UP
BUN SERPL-MCNC: 32 MG/DL — HIGH (ref 10–20)
CALCIUM SERPL-MCNC: 9.2 MG/DL — SIGNIFICANT CHANGE UP (ref 8.5–10.1)
CHLORIDE SERPL-SCNC: 102 MMOL/L — SIGNIFICANT CHANGE UP (ref 98–110)
CO2 SERPL-SCNC: 25 MMOL/L — SIGNIFICANT CHANGE UP (ref 17–32)
COLOR SPEC: YELLOW — SIGNIFICANT CHANGE UP
CREAT SERPL-MCNC: 1.3 MG/DL — SIGNIFICANT CHANGE UP (ref 0.7–1.5)
DIFF PNL FLD: NEGATIVE — SIGNIFICANT CHANGE UP
GLUCOSE SERPL-MCNC: 94 MG/DL — SIGNIFICANT CHANGE UP (ref 70–99)
GLUCOSE UR QL: NEGATIVE MG/DL — SIGNIFICANT CHANGE UP
HCT VFR BLD CALC: 39 % — LOW (ref 42–52)
HGB BLD-MCNC: 13.1 G/DL — LOW (ref 14–18)
KETONES UR-MCNC: NEGATIVE — SIGNIFICANT CHANGE UP
LEUKOCYTE ESTERASE UR-ACNC: NEGATIVE — SIGNIFICANT CHANGE UP
MCHC RBC-ENTMCNC: 30.1 PG — SIGNIFICANT CHANGE UP (ref 27–31)
MCHC RBC-ENTMCNC: 33.6 G/DL — SIGNIFICANT CHANGE UP (ref 32–37)
MCV RBC AUTO: 89.7 FL — SIGNIFICANT CHANGE UP (ref 80–94)
NITRITE UR-MCNC: NEGATIVE — SIGNIFICANT CHANGE UP
NRBC # BLD: 0 /100 WBCS — SIGNIFICANT CHANGE UP (ref 0–0)
PH UR: 7 — SIGNIFICANT CHANGE UP (ref 5–8)
PLATELET # BLD AUTO: 192 K/UL — SIGNIFICANT CHANGE UP (ref 130–400)
POTASSIUM SERPL-MCNC: 4.8 MMOL/L — SIGNIFICANT CHANGE UP (ref 3.5–5)
POTASSIUM SERPL-SCNC: 4.8 MMOL/L — SIGNIFICANT CHANGE UP (ref 3.5–5)
PROT SERPL-MCNC: 6.7 G/DL — SIGNIFICANT CHANGE UP (ref 6–8)
PROT UR-MCNC: NEGATIVE MG/DL — SIGNIFICANT CHANGE UP
RBC # BLD: 4.35 M/UL — LOW (ref 4.7–6.1)
RBC # FLD: 13.1 % — SIGNIFICANT CHANGE UP (ref 11.5–14.5)
SODIUM SERPL-SCNC: 141 MMOL/L — SIGNIFICANT CHANGE UP (ref 135–146)
SP GR SPEC: 1.01 — SIGNIFICANT CHANGE UP (ref 1.01–1.03)
TROPONIN T SERPL-MCNC: <0.01 NG/ML — SIGNIFICANT CHANGE UP
UROBILINOGEN FLD QL: 0.2 MG/DL — SIGNIFICANT CHANGE UP (ref 0.2–0.2)
WBC # BLD: 6.82 K/UL — SIGNIFICANT CHANGE UP (ref 4.8–10.8)
WBC # FLD AUTO: 6.82 K/UL — SIGNIFICANT CHANGE UP (ref 4.8–10.8)

## 2019-10-29 PROCEDURE — 99284 EMERGENCY DEPT VISIT MOD MDM: CPT

## 2019-10-29 PROCEDURE — 70450 CT HEAD/BRAIN W/O DYE: CPT | Mod: 26

## 2019-10-29 RX ORDER — MECLIZINE HCL 12.5 MG
12.5 TABLET ORAL ONCE
Refills: 0 | Status: COMPLETED | OUTPATIENT
Start: 2019-10-29 | End: 2019-10-29

## 2019-10-29 RX ORDER — ONDANSETRON 8 MG/1
4 TABLET, FILM COATED ORAL ONCE
Refills: 0 | Status: DISCONTINUED | OUTPATIENT
Start: 2019-10-29 | End: 2019-10-29

## 2019-10-29 RX ORDER — MECLIZINE HCL 12.5 MG
1 TABLET ORAL
Qty: 28 | Refills: 0
Start: 2019-10-29 | End: 2019-11-04

## 2019-10-29 RX ORDER — ONDANSETRON 8 MG/1
4 TABLET, FILM COATED ORAL ONCE
Refills: 0 | Status: COMPLETED | OUTPATIENT
Start: 2019-10-29 | End: 2019-10-29

## 2019-10-29 RX ADMIN — ONDANSETRON 4 MILLIGRAM(S): 8 TABLET, FILM COATED ORAL at 18:51

## 2019-10-29 RX ADMIN — Medication 12.5 MILLIGRAM(S): at 18:51

## 2019-10-29 NOTE — ED ADULT NURSE NOTE - CHPI ED NUR SYMPTOMS NEG
no blurred vision/no weakness/no numbness/no change in level of consciousness/no confusion/no loss of consciousness/no dizziness/no fever/no vomiting

## 2019-10-29 NOTE — ED PROVIDER NOTE - ATTENDING CONTRIBUTION TO CARE
85yo M with PMHx hypothyroidism GERD, presents with sudden onset of vertigo described as spinning sensation since this morning worse with changes in position. 3 days ago had sensation of nausea and frequent belching, had one episode of NBNB emesis, nausea resolved after 1 day. Denies numbness, tingling, weakness, slurred speech, facial droop. Denies headache, lightheadedness, blurry vision, hearing changes/tinnitus. Denies fever, CP, SOB, cough, diarrhea, abd pain, dysuria, leg swelling.     Vital signs reviewed  GENERAL: Patient well appearing, NAD  HEAD: NCAT  EYES: Anicteric. PERRL, EOMI. Fatiguable horizontal nystagmus.  ENT: MMM  RESPIRATORY: Normal respiratory effort. CTA B/L. No wheezing, rales, rhonchi  CARDIOVASCULAR: Regular rate and rhythm  ABDOMEN: Soft. Nondistended. Nontender. No guarding or rebound.   MUSCULOSKELETAL/EXTREMITIES: Brisk cap refill. Equal radial pulses. No leg edema.  SKIN:  Warm and dry  NEURO: AAOx3. GCS 15. Speech clear and coherent. Answering questions appropriately. Face symmetric, no facial droop. Strength 5/5 x4, no drift. Normal finger-to-nose. No dysmetria. No gross FND.

## 2019-10-29 NOTE — ED PROVIDER NOTE - NS ED ROS FT
REVIEW OF SYSTEMS:    CONSTITUTIONAL: No weakness, fevers or chills  EYES/ENT: No visual changes;  No vertigo or throat pain   NECK: No pain or stiffness  RESPIRATORY: No cough, wheezing, hemoptysis; No shortness of breath  CARDIOVASCULAR: No chest pain or palpitations  GASTROINTESTINAL: Nausea and vomiting present, decreased PO intake   GENITOURINARY: No dysuria, frequency or hematuria  NEUROLOGICAL: No numbness or weakness  SKIN: No itching, rashes

## 2019-10-29 NOTE — ED PROVIDER NOTE - PROVIDER TOKENS
PROVIDER:[TOKEN:[[id number]],FOLLOWUP:[1-3 Days]],PROVIDER:[TOKEN:[[id number]],FOLLOWUP:[1-3 Days]]

## 2019-10-29 NOTE — ED PROVIDER NOTE - NSFOLLOWUPINSTRUCTIONS_ED_ALL_ED_FT
you were seen in the ED for the vertigo. CT scan was done in the ED that did not show any stroke. I am sending home. Take meclizine 1 tab every 6 hours as needed for the dizziness. Return to the ED if symptoms worsens or if you have any vision changes, weakness in your arms or legs, difficulty swallowing or change in your vision. 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 Also see ENT surgeon for the vertigo.

## 2019-10-29 NOTE — ED PROVIDER NOTE - CARE PROVIDER_API CALL
Jeremy Hubbard)  Neuromuscular Medicine  [address] [place] [address], [address]  [place], [place] [id number]  Phone: [phone]  Fax: [phone]  Follow Up Time: 1-3 Days    Donna Bowen)  Otolaryngology  [address] [facility], [address]nd [place] [address]  Phone: [phone]  Fax: [phone]  Follow Up Time: 1-3 Days

## 2019-10-29 NOTE — ED PROVIDER NOTE - PHYSICAL EXAMINATION
T(C): 36 (10-29-19 @ 17:48), Max: 36 (10-29-19 @ 17:48)  HR: 72 (10-29-19 @ 17:48) (72 - 72)  BP: 146/77 (10-29-19 @ 17:48) (146/77 - 146/77)  RR: 18 (10-29-19 @ 17:48) (18 - 18)  SpO2: 97% (10-29-19 @ 17:48) (97% - 97%)    PHYSICAL EXAM:  GENERAL: NAD, well-developed  HEAD:  Atraumatic, Normocephalic  EYES: Horizontal nystagmus is present  NECK: Supple, No JVD  CHEST/LUNG: Clear to auscultation bilaterally; No wheeze  HEART: Regular rate and rhythm; No murmurs, rubs, or gallops  ABDOMEN: Soft, Nontender, Nondistended; Bowel sounds present  EXTREMITIES:  2+ Peripheral Pulses, No clubbing, cyanosis, or edema  PSYCH: AAOx3  NEUROLOGY: non-focal  SKIN: No rashes or lesions T(C): 36 (10-29-19 @ 17:48), Max: 36 (10-29-19 @ 17:48)  HR: 72 (10-29-19 @ 17:48) (72 - 72)  BP: 146/77 (10-29-19 @ 17:48) (146/77 - 146/77)  RR: 18 (10-29-19 @ 17:48) (18 - 18)  SpO2: 97% (10-29-19 @ 17:48) (97% - 97%)    PHYSICAL EXAM:  GENERAL: NAD, well-developed  HEAD:  Atraumatic, Normocephalic  EYES: Horizontal nystagmus is present  NECK: Supple, No JVD  CHEST/LUNG: Clear to auscultation bilaterally; No wheeze  HEART: Regular rate and rhythm; No murmurs, rubs, or gallops  ABDOMEN: Soft, Nontender, Nondistended; Bowel sounds present  EXTREMITIES:  2+ Peripheral Pulses, No clubbing, cyanosis, or edema  PSYCH: AAOx3  NEUROLOGY: Strength 5/5 UE and LE   CN 2-12 grossly intact  Sensory: intact   SKIN: No rashes or lesions

## 2019-10-29 NOTE — ED PROVIDER NOTE - PATIENT PORTAL LINK FT
You can access the FollowMyHealth Patient Portal offered by [facility] by registering at the following website: [email or web address]. By joining [facility]’s FollowMyHealth portal, you will also be able to view your health information using other applications (apps) compatible with our system.

## 2019-10-29 NOTE — ED PROVIDER NOTE - OBJECTIVE STATEMENT
87 yo M with PMH of hypothyroidism comes to the ED for the dizziness x 3 days. He explains it as spinning sensation and associated with the nausea and vomiting. Denies LOC, chest pain, palpitations, vision changes, tinnitus, hearing loss, weakness or change in his voice. 87 yo M with PMH of hypothyroidism comes to the ED for the dizziness x 3 days. He explains it as spinning sensation and associated with the nausea and vomiting. Denies LOC, chest pain, recent upper respiratory infection, palpitations, vision changes, tinnitus, hearing loss, weakness or change in his voice.

## 2019-10-29 NOTE — ED PROVIDER NOTE - CARE PROVIDERS DIRECT ADDRESSES
[email or web address]

## 2019-10-29 NOTE — ED PROVIDER NOTE - CLINICAL SUMMARY MEDICAL DECISION MAKING FREE TEXT BOX
87yo M presents with vertigo, questionable recent GI illness. Vertigo improved with meds, ambulating with steady gait. Labs unremarkable. EKG nonischemic. CT head showing communicating hydrocephalus. Vertigo likely peripheral and less likely 2/2 compromise of posterior circulation. Pt should f/u with neurology/ENT.   Patient to be discharged from ED. Any available test results were discussed with patient and/or family. Verbal instructions given, including instructions to return to ED immediately for any new, worsening, or concerning symptoms. Patient endorsed understanding. Written discharge instructions additionally given, including follow-up plan.

## 2019-10-30 PROBLEM — E03.9 HYPOTHYROIDISM, UNSPECIFIED: Chronic | Status: ACTIVE | Noted: 2019-02-15

## 2019-11-06 ENCOUNTER — APPOINTMENT (OUTPATIENT)
Age: 84
End: 2019-11-06
Payer: MEDICARE

## 2019-11-06 VITALS
DIASTOLIC BLOOD PRESSURE: 78 MMHG | HEIGHT: 68 IN | SYSTOLIC BLOOD PRESSURE: 124 MMHG | WEIGHT: 158 LBS | BODY MASS INDEX: 23.95 KG/M2

## 2019-11-06 DIAGNOSIS — H61.20 IMPACTED CERUMEN, UNSPECIFIED EAR: ICD-10-CM

## 2019-11-06 DIAGNOSIS — H91.90 UNSPECIFIED HEARING LOSS, UNSPECIFIED EAR: ICD-10-CM

## 2019-11-06 DIAGNOSIS — R42 DIZZINESS AND GIDDINESS: ICD-10-CM

## 2019-11-06 PROCEDURE — 92550 TYMPANOMETRY & REFLEX THRESH: CPT

## 2019-11-06 PROCEDURE — G0268 REMOVAL OF IMPACTED WAX MD: CPT

## 2019-11-06 PROCEDURE — 99204 OFFICE O/P NEW MOD 45 MIN: CPT | Mod: 25

## 2019-11-06 PROCEDURE — 92557 COMPREHENSIVE HEARING TEST: CPT

## 2019-11-06 NOTE — PHYSICAL EXAM
[Midline] : trachea located in midline position [Normal] : no rashes [de-identified] : bilateral impacted wax cleaned with curette

## 2019-11-06 NOTE — HISTORY OF PRESENT ILLNESS
[de-identified] : Patient presents today with c/o recent episode of dizziness. Patient states seen in the ER last Tuesday 10/29. Patient admits having a stomach virus last Sunday. He stayed in bed Monday. When he woke up Tuesday severe room spinning. Patient had CT and heart imaging which was negative. He also had an EKG. He feels 90% improvement. He does c/o facial pressure. \par Patient also c/o plugged sensation in his left ear. \par He describes dizziness as an imbalance feeling. no spinning. no pressure in the ears. Lasts hours. no pain.

## 2019-11-06 NOTE — ASSESSMENT
[FreeTextEntry1] : Audiogram reviewed today .\par No evidence of neuro-otologic cause.\par I reviewed patient's CT images, communicating hydrocephalus.\par \par After VNG probable referral to neuro and vestibular rehab.

## 2019-11-25 ENCOUNTER — EMERGENCY (EMERGENCY)
Facility: HOSPITAL | Age: 84
LOS: 0 days | Discharge: HOME | End: 2019-11-26
Attending: EMERGENCY MEDICINE | Admitting: EMERGENCY MEDICINE
Payer: MEDICARE

## 2019-11-25 VITALS
DIASTOLIC BLOOD PRESSURE: 58 MMHG | HEART RATE: 71 BPM | RESPIRATION RATE: 18 BRPM | TEMPERATURE: 98 F | WEIGHT: 162.04 LBS | SYSTOLIC BLOOD PRESSURE: 129 MMHG | OXYGEN SATURATION: 98 %

## 2019-11-25 DIAGNOSIS — Z90.49 ACQUIRED ABSENCE OF OTHER SPECIFIED PARTS OF DIGESTIVE TRACT: Chronic | ICD-10-CM

## 2019-11-25 DIAGNOSIS — E03.9 HYPOTHYROIDISM, UNSPECIFIED: ICD-10-CM

## 2019-11-25 DIAGNOSIS — R07.89 OTHER CHEST PAIN: ICD-10-CM

## 2019-11-25 DIAGNOSIS — Z79.899 OTHER LONG TERM (CURRENT) DRUG THERAPY: ICD-10-CM

## 2019-11-25 LAB
ALBUMIN SERPL ELPH-MCNC: 4.1 G/DL — SIGNIFICANT CHANGE UP (ref 3.5–5.2)
ALP SERPL-CCNC: 84 U/L — SIGNIFICANT CHANGE UP (ref 30–115)
ALT FLD-CCNC: 24 U/L — SIGNIFICANT CHANGE UP (ref 0–41)
ANION GAP SERPL CALC-SCNC: 15 MMOL/L — HIGH (ref 7–14)
AST SERPL-CCNC: 30 U/L — SIGNIFICANT CHANGE UP (ref 0–41)
BILIRUB SERPL-MCNC: 0.6 MG/DL — SIGNIFICANT CHANGE UP (ref 0.2–1.2)
BUN SERPL-MCNC: 24 MG/DL — HIGH (ref 10–20)
CALCIUM SERPL-MCNC: 9.2 MG/DL — SIGNIFICANT CHANGE UP (ref 8.5–10.1)
CHLORIDE SERPL-SCNC: 98 MMOL/L — SIGNIFICANT CHANGE UP (ref 98–110)
CO2 SERPL-SCNC: 26 MMOL/L — SIGNIFICANT CHANGE UP (ref 17–32)
CREAT SERPL-MCNC: 1.2 MG/DL — SIGNIFICANT CHANGE UP (ref 0.7–1.5)
GLUCOSE SERPL-MCNC: 90 MG/DL — SIGNIFICANT CHANGE UP (ref 70–99)
HCT VFR BLD CALC: 32.1 % — LOW (ref 42–52)
HGB BLD-MCNC: 10.7 G/DL — LOW (ref 14–18)
LIDOCAIN IGE QN: 28 U/L — SIGNIFICANT CHANGE UP (ref 7–60)
MAGNESIUM SERPL-MCNC: 2.1 MG/DL — SIGNIFICANT CHANGE UP (ref 1.8–2.4)
MCHC RBC-ENTMCNC: 29.8 PG — SIGNIFICANT CHANGE UP (ref 27–31)
MCHC RBC-ENTMCNC: 33.3 G/DL — SIGNIFICANT CHANGE UP (ref 32–37)
MCV RBC AUTO: 89.4 FL — SIGNIFICANT CHANGE UP (ref 80–94)
NRBC # BLD: 0 /100 WBCS — SIGNIFICANT CHANGE UP (ref 0–0)
PLATELET # BLD AUTO: 47 K/UL — LOW (ref 130–400)
POTASSIUM SERPL-MCNC: 4.6 MMOL/L — SIGNIFICANT CHANGE UP (ref 3.5–5)
POTASSIUM SERPL-SCNC: 4.6 MMOL/L — SIGNIFICANT CHANGE UP (ref 3.5–5)
PROT SERPL-MCNC: 6.5 G/DL — SIGNIFICANT CHANGE UP (ref 6–8)
RBC # BLD: 3.59 M/UL — LOW (ref 4.7–6.1)
RBC # FLD: 13.1 % — SIGNIFICANT CHANGE UP (ref 11.5–14.5)
SODIUM SERPL-SCNC: 139 MMOL/L — SIGNIFICANT CHANGE UP (ref 135–146)
TROPONIN T SERPL-MCNC: <0.01 NG/ML — SIGNIFICANT CHANGE UP
WBC # BLD: 5.68 K/UL — SIGNIFICANT CHANGE UP (ref 4.8–10.8)
WBC # FLD AUTO: 5.68 K/UL — SIGNIFICANT CHANGE UP (ref 4.8–10.8)

## 2019-11-25 PROCEDURE — 71046 X-RAY EXAM CHEST 2 VIEWS: CPT | Mod: 26

## 2019-11-25 PROCEDURE — 99285 EMERGENCY DEPT VISIT HI MDM: CPT

## 2019-11-25 RX ORDER — RANITIDINE HYDROCHLORIDE 150 MG/1
1 TABLET, FILM COATED ORAL
Qty: 0 | Refills: 0 | DISCHARGE

## 2019-11-25 RX ORDER — FAMOTIDINE 10 MG/ML
20 INJECTION INTRAVENOUS ONCE
Refills: 0 | Status: COMPLETED | OUTPATIENT
Start: 2019-11-25 | End: 2019-11-25

## 2019-11-25 RX ORDER — DIPHENHYDRAMINE HYDROCHLORIDE AND LIDOCAINE HYDROCHLORIDE AND ALUMINUM HYDROXIDE AND MAGNESIUM HYDRO
30 KIT ONCE
Refills: 0 | Status: COMPLETED | OUTPATIENT
Start: 2019-11-25 | End: 2019-11-25

## 2019-11-25 RX ORDER — SODIUM CHLORIDE 9 MG/ML
1000 INJECTION INTRAMUSCULAR; INTRAVENOUS; SUBCUTANEOUS
Refills: 0 | Status: DISCONTINUED | OUTPATIENT
Start: 2019-11-25 | End: 2019-11-26

## 2019-11-25 RX ORDER — CLEMASTINE FUMARATE 0.5 MG/5ML
0 SYRUP ORAL
Qty: 0 | Refills: 0 | DISCHARGE

## 2019-11-25 RX ADMIN — DIPHENHYDRAMINE HYDROCHLORIDE AND LIDOCAINE HYDROCHLORIDE AND ALUMINUM HYDROXIDE AND MAGNESIUM HYDRO 30 MILLILITER(S): KIT at 21:23

## 2019-11-25 NOTE — ED ADULT NURSE NOTE - CHIEF COMPLAINT QUOTE
Heartburn, belching and discomfort in his chest all day.  Took Maalox and pepto without relief.  Went to [facility]

## 2019-11-25 NOTE — ED PROVIDER NOTE - ATTENDING CONTRIBUTION TO CARE
Assessment/Plan:   Itching from unknown cause  No rash, hives, lesions noted  Will treat with OTC Benadryl PRN  She is to return if rash or itching reoccurs  She is to go to the ER for SOB or dysphagia   Diagnoses and all orders for this visit:    Severe itching  -     diphenhydrAMINE (BENADRYL) 25 mg tablet; Take 1 tablet (25 mg total) by mouth every 6 (six) hours as needed for itching    Other orders  -     amiodarone 200 mg tablet; Take 200 mg by mouth  -     atorvastatin (LIPITOR) 20 mg tablet; Take 20 mg by mouth  -     digoxin (LANOXIN) 0 125 mg tablet; Take 125 mcg by mouth  -     furosemide (LASIX) 20 mg tablet; Take 60 mg by mouth  -     gabapentin (NEURONTIN) 300 mg capsule; take 1 capsule by oral route 2 times every day  -     isosorbide mononitrate (IMDUR) 60 mg 24 hr tablet; take 1 tablet by oral route  every day  -     potassium chloride (KLOR-CON M10) 10 mEq tablet; every 24 hours  -     levothyroxine 25 mcg tablet; Take 25 mcg by mouth daily  -     lidocaine (LIDODERM) 5 %; Place 1 patch on the skin  -     losartan (COZAAR) 25 mg tablet; Take 25 mg by mouth  -     metoprolol succinate (TOPROL-XL) 100 mg 24 hr tablet; Take 100 mg by mouth  -     Magnesium Oxide 400 MG CAPS; 1 tab daily  -     nitroglycerin (NITROSTAT) 0 4 mg SL tablet; Place 0 4 mg under the tongue  -     omeprazole (PriLOSEC) 40 MG capsule; every 24 hours  -     oxyCODONE (ROXICODONE) 5 mg immediate release tablet; Take 2 5 mg by mouth every 4 (four) hours  -     rivaroxaban (XARELTO) 20 mg tablet; every 24 hours  -     levothyroxine (SYNTHROID) 25 mcg tablet; every 24 hours          Subjective:     Patient ID: Shona Cullen is a 80 y o  female  She wok this morning with severe itching but she did not have a rash or hives  She states her blood felt hot, like the itching was from the inside  The itch has since resolved but felt she should be seen  Review of Systems   HENT: Negative  Eyes: Negative      Respiratory: Negative  Cardiovascular: Negative  Skin: Negative for color change, pallor and rash  Had wide spread itching without a rash   Allergic/Immunologic:        Drug: codeine   Psychiatric/Behavioral: Negative  Objective:     Physical Exam   Constitutional: She is oriented to person, place, and time  She appears well-developed and well-nourished  Eyes: Conjunctivae are normal  Pupils are equal, round, and reactive to light  Neck: Normal range of motion  Neck supple  Cardiovascular: Normal rate, regular rhythm and normal heart sounds  Pulmonary/Chest: Effort normal  She has no wheezes  Abdominal: Soft  Bowel sounds are normal    Neurological: She is alert and oriented to person, place, and time  Skin: Skin is warm and dry  No rash noted  No erythema  Psychiatric: She has a normal mood and affect   Her behavior is normal  Judgment and thought content normal  I personally evaluated the patient. I reviewed the Resident’s or Physician Assistant’s note (as assigned above), and agree with the findings and plan except as documented in my note.

## 2019-11-25 NOTE — ED PROVIDER NOTE - OBJECTIVE STATEMENT
86 year old male past medical history of GERD states that since eating breakfast this am he has been having stomach burning and chest burning. patient states he tried his usual home remedies but states are not helping. denies shortness of breath. no fever/chills. no back pain. no nausea and vomiting.

## 2019-11-25 NOTE — ED PROVIDER NOTE - CLINICAL SUMMARY MEDICAL DECISION MAKING FREE TEXT BOX
86yM with gerd on PPI pw chest discomfort described as burning since  breakfast - no sob no diaphoresis or syncope, no abdominal pain - not reviewed by maalox or baking soda,  had negative stress test 1 year ago.

## 2019-11-25 NOTE — ED PROVIDER NOTE - CARE PROVIDER_API CALL
Yariel Montes)  Cardiovascular Disease; Internal Medicine  [address] [place] [address]  Phone: [phone]  Fax: [phone]  Follow Up Time: 1-3 Days

## 2019-11-25 NOTE — ED PROVIDER NOTE - PATIENT PORTAL LINK FT
You can access the FollowMyHealth Patient Portal offered by [facility] by registering at the following website: [email or web address]. By joining [facility]’s FollowMyHealth portal, you will also be able to view your health information using other applications (apps) compatible with our system.

## 2019-11-25 NOTE — ED ADULT TRIAGE NOTE - CHIEF COMPLAINT QUOTE
Heartburn, belching and discomfort in his chest all day.  Took Maalox and pepto without relief.  Went to [facility]

## 2019-11-26 VITALS
OXYGEN SATURATION: 98 % | SYSTOLIC BLOOD PRESSURE: 125 MMHG | RESPIRATION RATE: 18 BRPM | DIASTOLIC BLOOD PRESSURE: 75 MMHG | HEART RATE: 75 BPM | TEMPERATURE: 98 F

## 2019-11-26 LAB — TROPONIN T SERPL-MCNC: <0.01 NG/ML — SIGNIFICANT CHANGE UP

## 2020-11-16 ENCOUNTER — EMERGENCY (EMERGENCY)
Facility: HOSPITAL | Age: 85
LOS: 0 days | Discharge: HOME | End: 2020-11-16
Attending: EMERGENCY MEDICINE | Admitting: EMERGENCY MEDICINE
Payer: MEDICARE

## 2020-11-16 VITALS
DIASTOLIC BLOOD PRESSURE: 53 MMHG | SYSTOLIC BLOOD PRESSURE: 109 MMHG | OXYGEN SATURATION: 97 % | RESPIRATION RATE: 20 BRPM | HEART RATE: 82 BPM

## 2020-11-16 VITALS
WEIGHT: 160.94 LBS | OXYGEN SATURATION: 95 % | SYSTOLIC BLOOD PRESSURE: 131 MMHG | RESPIRATION RATE: 20 BRPM | DIASTOLIC BLOOD PRESSURE: 56 MMHG | TEMPERATURE: 100 F | HEIGHT: 68 IN | HEART RATE: 104 BPM

## 2020-11-16 DIAGNOSIS — R39.15 URGENCY OF URINATION: ICD-10-CM

## 2020-11-16 DIAGNOSIS — Z90.49 ACQUIRED ABSENCE OF OTHER SPECIFIED PARTS OF DIGESTIVE TRACT: Chronic | ICD-10-CM

## 2020-11-16 DIAGNOSIS — Z79.899 OTHER LONG TERM (CURRENT) DRUG THERAPY: ICD-10-CM

## 2020-11-16 DIAGNOSIS — R53.1 WEAKNESS: ICD-10-CM

## 2020-11-16 DIAGNOSIS — Z90.49 ACQUIRED ABSENCE OF OTHER SPECIFIED PARTS OF DIGESTIVE TRACT: ICD-10-CM

## 2020-11-16 DIAGNOSIS — K21.9 GASTRO-ESOPHAGEAL REFLUX DISEASE WITHOUT ESOPHAGITIS: ICD-10-CM

## 2020-11-16 DIAGNOSIS — Z20.828 CONTACT WITH AND (SUSPECTED) EXPOSURE TO OTHER VIRAL COMMUNICABLE DISEASES: ICD-10-CM

## 2020-11-16 DIAGNOSIS — E03.9 HYPOTHYROIDISM, UNSPECIFIED: ICD-10-CM

## 2020-11-16 DIAGNOSIS — R01.0 BENIGN AND INNOCENT CARDIAC MURMURS: ICD-10-CM

## 2020-11-16 DIAGNOSIS — R50.9 FEVER, UNSPECIFIED: ICD-10-CM

## 2020-11-16 LAB
ALBUMIN SERPL ELPH-MCNC: 4.3 G/DL — SIGNIFICANT CHANGE UP (ref 3.5–5.2)
ALP SERPL-CCNC: 82 U/L — SIGNIFICANT CHANGE UP (ref 30–115)
ALT FLD-CCNC: 32 U/L — SIGNIFICANT CHANGE UP (ref 0–41)
ANION GAP SERPL CALC-SCNC: 10 MMOL/L — SIGNIFICANT CHANGE UP (ref 7–14)
APPEARANCE UR: CLEAR — SIGNIFICANT CHANGE UP
APTT BLD: 32.2 SEC — SIGNIFICANT CHANGE UP (ref 27–39.2)
AST SERPL-CCNC: 37 U/L — SIGNIFICANT CHANGE UP (ref 0–41)
BACTERIA # UR AUTO: ABNORMAL
BASOPHILS # BLD AUTO: 0.04 K/UL — SIGNIFICANT CHANGE UP (ref 0–0.2)
BASOPHILS NFR BLD AUTO: 0.2 % — SIGNIFICANT CHANGE UP (ref 0–1)
BILIRUB SERPL-MCNC: 0.8 MG/DL — SIGNIFICANT CHANGE UP (ref 0.2–1.2)
BILIRUB UR-MCNC: NEGATIVE — SIGNIFICANT CHANGE UP
BUN SERPL-MCNC: 36 MG/DL — HIGH (ref 10–20)
CALCIUM SERPL-MCNC: 9.5 MG/DL — SIGNIFICANT CHANGE UP (ref 8.5–10.1)
CHLORIDE SERPL-SCNC: 103 MMOL/L — SIGNIFICANT CHANGE UP (ref 98–110)
CO2 SERPL-SCNC: 27 MMOL/L — SIGNIFICANT CHANGE UP (ref 17–32)
COLOR SPEC: YELLOW — SIGNIFICANT CHANGE UP
COMMENT - URINE: SIGNIFICANT CHANGE UP
CREAT SERPL-MCNC: 1.3 MG/DL — SIGNIFICANT CHANGE UP (ref 0.7–1.5)
DIFF PNL FLD: NEGATIVE — SIGNIFICANT CHANGE UP
EOSINOPHIL # BLD AUTO: 0.03 K/UL — SIGNIFICANT CHANGE UP (ref 0–0.7)
EOSINOPHIL NFR BLD AUTO: 0.2 % — SIGNIFICANT CHANGE UP (ref 0–8)
GLUCOSE SERPL-MCNC: 113 MG/DL — HIGH (ref 70–99)
GLUCOSE UR QL: NEGATIVE MG/DL — SIGNIFICANT CHANGE UP
HCT VFR BLD CALC: 39.2 % — LOW (ref 42–52)
HGB BLD-MCNC: 13.2 G/DL — LOW (ref 14–18)
IMM GRANULOCYTES NFR BLD AUTO: 0.5 % — HIGH (ref 0.1–0.3)
INR BLD: 1.11 RATIO — SIGNIFICANT CHANGE UP (ref 0.65–1.3)
KETONES UR-MCNC: NEGATIVE — SIGNIFICANT CHANGE UP
LACTATE SERPL-SCNC: 1.2 MMOL/L — SIGNIFICANT CHANGE UP (ref 0.7–2)
LEUKOCYTE ESTERASE UR-ACNC: NEGATIVE — SIGNIFICANT CHANGE UP
LIDOCAIN IGE QN: 33 U/L — SIGNIFICANT CHANGE UP (ref 7–60)
LYMPHOCYTES # BLD AUTO: 0.38 K/UL — LOW (ref 1.2–3.4)
LYMPHOCYTES # BLD AUTO: 2.1 % — LOW (ref 20.5–51.1)
MANUAL SMEAR VERIFICATION: SIGNIFICANT CHANGE UP
MCHC RBC-ENTMCNC: 30.2 PG — SIGNIFICANT CHANGE UP (ref 27–31)
MCHC RBC-ENTMCNC: 33.7 G/DL — SIGNIFICANT CHANGE UP (ref 32–37)
MCV RBC AUTO: 89.7 FL — SIGNIFICANT CHANGE UP (ref 80–94)
MONOCYTES # BLD AUTO: 1 K/UL — HIGH (ref 0.1–0.6)
MONOCYTES NFR BLD AUTO: 5.5 % — SIGNIFICANT CHANGE UP (ref 1.7–9.3)
NEUTROPHILS # BLD AUTO: 16.51 K/UL — HIGH (ref 1.4–6.5)
NEUTROPHILS NFR BLD AUTO: 91.5 % — HIGH (ref 42.2–75.2)
NEUTS BAND # BLD: 6 % — SIGNIFICANT CHANGE UP (ref 0–6)
NITRITE UR-MCNC: NEGATIVE — SIGNIFICANT CHANGE UP
NRBC # BLD: 0 /100 WBCS — SIGNIFICANT CHANGE UP (ref 0–0)
NRBC # BLD: 0 /100 — SIGNIFICANT CHANGE UP (ref 0–0)
PH UR: 6 — SIGNIFICANT CHANGE UP (ref 5–8)
PLAT MORPH BLD: NORMAL — SIGNIFICANT CHANGE UP
PLATELET # BLD AUTO: 167 K/UL — SIGNIFICANT CHANGE UP (ref 130–400)
POTASSIUM SERPL-MCNC: 4.2 MMOL/L — SIGNIFICANT CHANGE UP (ref 3.5–5)
POTASSIUM SERPL-SCNC: 4.2 MMOL/L — SIGNIFICANT CHANGE UP (ref 3.5–5)
PROT SERPL-MCNC: 6.6 G/DL — SIGNIFICANT CHANGE UP (ref 6–8)
PROT UR-MCNC: ABNORMAL MG/DL
PROTHROM AB SERPL-ACNC: 12.8 SEC — SIGNIFICANT CHANGE UP (ref 9.95–12.87)
RAPID RVP RESULT: SIGNIFICANT CHANGE UP
RBC # BLD: 4.37 M/UL — LOW (ref 4.7–6.1)
RBC # FLD: 13.3 % — SIGNIFICANT CHANGE UP (ref 11.5–14.5)
RBC BLD AUTO: NORMAL — SIGNIFICANT CHANGE UP
SARS-COV-2 RNA SPEC QL NAA+PROBE: SIGNIFICANT CHANGE UP
SODIUM SERPL-SCNC: 140 MMOL/L — SIGNIFICANT CHANGE UP (ref 135–146)
SP GR SPEC: 1.02 — SIGNIFICANT CHANGE UP (ref 1.01–1.03)
UROBILINOGEN FLD QL: 0.2 MG/DL — SIGNIFICANT CHANGE UP (ref 0.2–0.2)
WBC # BLD: 18.05 K/UL — HIGH (ref 4.8–10.8)
WBC # FLD AUTO: 18.05 K/UL — HIGH (ref 4.8–10.8)
WBC UR QL: SIGNIFICANT CHANGE UP /HPF

## 2020-11-16 PROCEDURE — 99285 EMERGENCY DEPT VISIT HI MDM: CPT

## 2020-11-16 PROCEDURE — 71045 X-RAY EXAM CHEST 1 VIEW: CPT | Mod: 26

## 2020-11-16 RX ORDER — ACETAMINOPHEN 500 MG
650 TABLET ORAL ONCE
Refills: 0 | Status: COMPLETED | OUTPATIENT
Start: 2020-11-16 | End: 2020-11-16

## 2020-11-16 RX ORDER — SODIUM CHLORIDE 9 MG/ML
1000 INJECTION, SOLUTION INTRAVENOUS ONCE
Refills: 0 | Status: COMPLETED | OUTPATIENT
Start: 2020-11-16 | End: 2020-11-16

## 2020-11-16 RX ADMIN — SODIUM CHLORIDE 1000 MILLILITER(S): 9 INJECTION, SOLUTION INTRAVENOUS at 11:21

## 2020-11-16 RX ADMIN — Medication 650 MILLIGRAM(S): at 08:55

## 2020-11-16 RX ADMIN — SODIUM CHLORIDE 1000 MILLILITER(S): 9 INJECTION, SOLUTION INTRAVENOUS at 09:00

## 2020-11-16 NOTE — ED PROVIDER NOTE - CLINICAL SUMMARY MEDICAL DECISION MAKING FREE TEXT BOX
dx testing reviewed. pt appears well after ED tx.  In my opinion, out patient treatment and follow up are appropriate.

## 2020-11-16 NOTE — ED ADULT NURSE NOTE - NSIMPLEMENTINTERV_GEN_ALL_ED
Implemented All Fall Risk Interventions:  [place] to call system. Call bell, personal items and telephone within reach. Instruct patient to call for assistance. Room bathroom lighting operational. Non-slip footwear when patient is off stretcher. Physically safe environment: no spills, clutter or unnecessary equipment. Stretcher in lowest position, wheels locked, appropriate side rails in place. Provide visual cue, wrist band, yellow gown, etc. Monitor gait and stability. Monitor for mental status changes and reorient to person, place, and time. Review medications for side effects contributing to fall risk. Reinforce activity limits and safety measures with patient and family. Implemented All Fall with Harm Risk Interventions:  [place] to call system. Call bell, personal items and telephone within reach. Instruct patient to call for assistance. Room bathroom lighting operational. Non-slip footwear when patient is off stretcher. Physically safe environment: no spills, clutter or unnecessary equipment. Stretcher in lowest position, wheels locked, appropriate side rails in place. Provide visual cue, wrist band, yellow gown, etc. Monitor gait and stability. Monitor for mental status changes and reorient to person, place, and time. Review medications for side effects contributing to fall risk. Reinforce activity limits and safety measures with patient and family. Provide visual clues: red socks.

## 2020-11-16 NOTE — ED PROVIDER NOTE - PATIENT PORTAL LINK FT
You can access the FollowMyHealth Patient Portal offered by [facility] by registering at the following website: [email or web address]. By joining [facility]’s FollowMyHealth portal, you will also be able to view your health information using other applications (apps) compatible with our system.

## 2020-11-16 NOTE — ED PROVIDER NOTE - ATTENDING CONTRIBUTION TO CARE
generalized weakness a/w febrile illness.  VS noted.  Chest clear.  Heart RR 2/6 systolic murmur best heard RUSB.   abd NT.  perineum NT.  Ext FROM.  =pulses. neuro no focal deficit.  CXR: unchanged pleural based calcifications.  pt is a former asbestosis worker.  labs reviewed.  await U/A.

## 2020-11-16 NOTE — ED PROVIDER NOTE - CARE PROVIDER_API CALL
Roxann Quick Y  INTERNAL MEDICINE  [address] [place] [address]  Phone: [phone]  Fax: [phone]  Follow Up Time: 1-3 Days

## 2020-11-16 NOTE — ED PROVIDER NOTE - PHYSICAL EXAMINATION
CONSTITUTIONAL: Well-developed; well-nourished; in no acute distress.   SKIN: warm, dry  HEAD: Normocephalic; atraumatic.  EYES: no conjunctival injection. PERRLA. EOMI.   ENT: No nasal discharge; airway clear.  NECK: Supple; non tender.  CARD: S1, S2 normal; Regular rate and rhythm.   RESP: No wheezes, rales or rhonchi.  ABD: soft ntnd. no CVA TTP   EXT: Normal ROM.  No LE edema.   LYMPH: No acute cervical adenopathy.  NEURO: AOx3, CN 2-12 grossly intact. +5/5 strength and sensation wnl in all extremities. No pronator drift, no dysarthria, no ataxia, normal gait.   PSYCH: Cooperative, appropriate.

## 2020-11-16 NOTE — ED PROVIDER NOTE - NSFOLLOWUPINSTRUCTIONS_ED_ALL_ED_FT
Weakness    Weakness is a lack of strength. It may be felt all over the body (generalized) or in one specific part of the body (focal). Some causes of weakness can be serious. You may need further medical evaluation, especially if you are elderly or you have a history of immunosuppression (such as chemotherapy or HIV), kidney disease, heart disease, or diabetes.     CAUSES  Weakness can be caused by many different things, including:    Infection.  Physical exhaustion.  Internal bleeding or other blood loss that results in a lack of red blood cells (anemia).  Dehydration. This cause is more common in elderly people.  Side effects or electrolyte abnormalities from medicines, such as pain medicines or sedatives.  Emotional distress, anxiety, or depression.  Circulation problems, especially severe peripheral arterial disease.  Heart disease, such as rapid atrial fibrillation, bradycardia, or heart failure.  Nervous system disorders, such as Guillain-Barré syndrome, multiple sclerosis, or stroke.    DIAGNOSIS  To find the cause of your weakness, your caregiver will take your history and perform a physical exam. Lab tests or X-rays may also be ordered, if needed.    TREATMENT  Treatment of weakness depends on the cause of your symptoms and can vary greatly.    HOME CARE INSTRUCTIONS  Rest as needed.  Eat a well-balanced diet.  Try to get some exercise every day.  Only take over-the-counter or prescription medicines as directed by your caregiver.    SEEK MEDICAL CARE IF:  Your weakness seems to be getting worse or spreads to other parts of your body.  You develop new aches or pains.    SEEK IMMEDIATE MEDICAL CARE IF:  You cannot perform your normal daily activities, such as getting dressed and feeding yourself.  You cannot walk up and down stairs, or you feel exhausted when you do so.  You have shortness of breath or chest pain.  You have difficulty moving parts of your body.   You have weakness in only one area of the body or on only one side of the body.  You have a fever.  You have trouble speaking or swallowing.  You cannot control your bladder or bowel movements.  You have black or bloody vomit or stools.    MAKE SURE YOU:  Understand these instructions.  Will watch your condition.  Will get help right away if you are not doing well or get worse.    ADDITIONAL NOTES AND INSTRUCTIONS    Please follow up with your Primary MD in 24-48 hr.  Seek immediate medical care for any new/worsening signs or symptoms.

## 2020-11-16 NOTE — ED PROVIDER NOTE - NS ED ROS FT
Review of Systems:  CONSTITUTIONAL: +fever, No diaphoresis, No weight change  SKIN: No rash  HEMATOLOGIC: No abnormal bleeding or bruising  EYES: No eye pain, No blurred vision  ENT: No change in hearing, No sore throat, No neck pain, No rhinorrhea, No ear pain  RESPIRATORY: No shortness of breath, No cough  CARDIAC: No chest pain, No palpitations  GI: No abdominal pain, No nausea, No vomiting, No diarrhea, No constipation, No bright red blood per rectum or melena. No flank pain.   : No dysuria, frequency, hematuria. +urinary urgency.   MUSCULOSKELETAL: No joint paint, No swelling, No back pain, +generalized weakness.   NEUROLOGIC: No numbness, No focal weakness, No headache, No dizziness  All other systems negative, unless specified in HPI

## 2020-11-16 NOTE — ED ADULT NURSE NOTE - CHIEF COMPLAINT QUOTE
"I got up in the middle of the night to go to the bathroom and I felt a little nauseous. This morning I went to get up and my legs feel very weak." Pt states he tested COVID negative on Friday.

## 2020-11-16 NOTE — ED PROVIDER NOTE - PROGRESS NOTE DETAILS
DL - Bladder scan performed bedside, 72 cc not in retention DL - Bladder scan performed bedside, 72 cc not in retention. Pt hungry and states he is feeling better. Pending UA at this time. second liter of IVF ordered DL: pt hemodynamically stable, eating, walking feeling better. will dc home with strict return precautions. called pts family and made them aware, emphasizing strict return precautions.

## 2020-11-16 NOTE — ED PROVIDER NOTE - OBJECTIVE STATEMENT
88 y/o M PMHx hypothyroidism and GERD presents to ED with generalized weakness. Pt reports urgency to urinate last night, walked to bathroom but felt too weak to continue so he laid back down in his bed. No fall no LOC. Pt febrile 101 in ED, no hx of reported fevers. Pt reports chills overnight. No sick contacts. No cough, vomiting, bloody stools.

## 2020-11-16 NOTE — ED PROVIDER NOTE - CARE PLAN
Principal Discharge DX:	Generalized weakness   Principal Discharge DX:	Generalized weakness  Secondary Diagnosis:	Fever

## 2020-11-21 LAB
CULTURE RESULTS: SIGNIFICANT CHANGE UP
CULTURE RESULTS: SIGNIFICANT CHANGE UP
SPECIMEN SOURCE: SIGNIFICANT CHANGE UP
SPECIMEN SOURCE: SIGNIFICANT CHANGE UP

## 2021-04-10 ENCOUNTER — EMERGENCY (EMERGENCY)
Facility: HOSPITAL | Age: 86
LOS: 0 days | Discharge: HOME | End: 2021-04-10
Attending: EMERGENCY MEDICINE | Admitting: EMERGENCY MEDICINE
Payer: MEDICARE

## 2021-04-10 VITALS
HEART RATE: 75 BPM | RESPIRATION RATE: 18 BRPM | TEMPERATURE: 97 F | OXYGEN SATURATION: 98 % | HEIGHT: 68 IN | DIASTOLIC BLOOD PRESSURE: 66 MMHG | WEIGHT: 160.06 LBS | SYSTOLIC BLOOD PRESSURE: 145 MMHG

## 2021-04-10 VITALS
SYSTOLIC BLOOD PRESSURE: 140 MMHG | DIASTOLIC BLOOD PRESSURE: 63 MMHG | HEART RATE: 64 BPM | TEMPERATURE: 96 F | RESPIRATION RATE: 18 BRPM | OXYGEN SATURATION: 99 %

## 2021-04-10 DIAGNOSIS — R07.89 OTHER CHEST PAIN: ICD-10-CM
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
## 2021-04-10 DIAGNOSIS — Z90.49 ACQUIRED ABSENCE OF OTHER SPECIFIED PARTS OF DIGESTIVE TRACT: Chronic | ICD-10-CM

## 2021-04-10 DIAGNOSIS — E03.9 HYPOTHYROIDISM, UNSPECIFIED: ICD-10-CM
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
## 2021-04-10 DIAGNOSIS — K21.9 GASTRO-ESOPHAGEAL REFLUX DISEASE WITHOUT ESOPHAGITIS: ICD-10-CM

## 2021-04-10 LAB
ALBUMIN SERPL ELPH-MCNC: 4.2 G/DL — SIGNIFICANT CHANGE UP (ref 3.5–5.2)
ALP SERPL-CCNC: 115 U/L — SIGNIFICANT CHANGE UP (ref 30–115)
ALT FLD-CCNC: 26 U/L — SIGNIFICANT CHANGE UP (ref 0–41)
ANION GAP SERPL CALC-SCNC: 9 MMOL/L — SIGNIFICANT CHANGE UP (ref 7–14)
AST SERPL-CCNC: 29 U/L — SIGNIFICANT CHANGE UP (ref 0–41)
BASOPHILS # BLD AUTO: 0.03 K/UL — SIGNIFICANT CHANGE UP (ref 0–0.2)
BASOPHILS NFR BLD AUTO: 0.4 % — SIGNIFICANT CHANGE UP (ref 0–1)
BILIRUB SERPL-MCNC: 0.6 MG/DL — SIGNIFICANT CHANGE UP (ref 0.2–1.2)
BUN SERPL-MCNC: 33 MG/DL — HIGH (ref 10–20)
CALCIUM SERPL-MCNC: 9.9 MG/DL — SIGNIFICANT CHANGE UP (ref 8.5–10.1)
CHLORIDE SERPL-SCNC: 105 MMOL/L — SIGNIFICANT CHANGE UP (ref 98–110)
CO2 SERPL-SCNC: 28 MMOL/L — SIGNIFICANT CHANGE UP (ref 17–32)
CREAT SERPL-MCNC: 1.3 MG/DL — SIGNIFICANT CHANGE UP (ref 0.7–1.5)
EOSINOPHIL # BLD AUTO: 0.27 K/UL — SIGNIFICANT CHANGE UP (ref 0–0.7)
EOSINOPHIL NFR BLD AUTO: 3.8 % — SIGNIFICANT CHANGE UP (ref 0–8)
GLUCOSE SERPL-MCNC: 108 MG/DL — HIGH (ref 70–99)
HCT VFR BLD CALC: 39.2 % — LOW (ref 42–52)
HGB BLD-MCNC: 13 G/DL — LOW (ref 14–18)
IMM GRANULOCYTES NFR BLD AUTO: 0.4 % — HIGH (ref 0.1–0.3)
LYMPHOCYTES # BLD AUTO: 1.33 K/UL — SIGNIFICANT CHANGE UP (ref 1.2–3.4)
LYMPHOCYTES # BLD AUTO: 18.6 % — LOW (ref 20.5–51.1)
MCHC RBC-ENTMCNC: 29.7 PG — SIGNIFICANT CHANGE UP (ref 27–31)
MCHC RBC-ENTMCNC: 33.2 G/DL — SIGNIFICANT CHANGE UP (ref 32–37)
MCV RBC AUTO: 89.5 FL — SIGNIFICANT CHANGE UP (ref 80–94)
MONOCYTES # BLD AUTO: 0.79 K/UL — HIGH (ref 0.1–0.6)
MONOCYTES NFR BLD AUTO: 11.1 % — HIGH (ref 1.7–9.3)
NEUTROPHILS # BLD AUTO: 4.69 K/UL — SIGNIFICANT CHANGE UP (ref 1.4–6.5)
NEUTROPHILS NFR BLD AUTO: 65.7 % — SIGNIFICANT CHANGE UP (ref 42.2–75.2)
NRBC # BLD: 0 /100 WBCS — SIGNIFICANT CHANGE UP (ref 0–0)
PLATELET # BLD AUTO: 179 K/UL — SIGNIFICANT CHANGE UP (ref 130–400)
POTASSIUM SERPL-MCNC: 5.5 MMOL/L — HIGH (ref 3.5–5)
POTASSIUM SERPL-SCNC: 5.5 MMOL/L — HIGH (ref 3.5–5)
PROT SERPL-MCNC: 6.8 G/DL — SIGNIFICANT CHANGE UP (ref 6–8)
RBC # BLD: 4.38 M/UL — LOW (ref 4.7–6.1)
RBC # FLD: 12.7 % — SIGNIFICANT CHANGE UP (ref 11.5–14.5)
SODIUM SERPL-SCNC: 142 MMOL/L — SIGNIFICANT CHANGE UP (ref 135–146)
TROPONIN T SERPL-MCNC: <0.01 NG/ML — SIGNIFICANT CHANGE UP
WBC # BLD: 7.14 K/UL — SIGNIFICANT CHANGE UP (ref 4.8–10.8)
WBC # FLD AUTO: 7.14 K/UL — SIGNIFICANT CHANGE UP (ref 4.8–10.8)

## 2021-04-10 PROCEDURE — 74177 CT ABD & PELVIS W/CONTRAST: CPT | Mod: 26

## 2021-04-10 PROCEDURE — 99285 EMERGENCY DEPT VISIT HI MDM: CPT

## 2021-04-10 PROCEDURE — 71260 CT THORAX DX C+: CPT | Mod: 26

## 2021-04-10 PROCEDURE — 71045 X-RAY EXAM CHEST 1 VIEW: CPT | Mod: 26

## 2021-04-10 RX ORDER — ACETAMINOPHEN 500 MG
650 TABLET ORAL ONCE
Refills: 0 | Status: COMPLETED | OUTPATIENT
Start: 2021-04-10 | End: 2021-04-10

## 2021-04-10 NOTE — ED PROVIDER NOTE - CLINICAL SUMMARY MEDICAL DECISION MAKING FREE TEXT BOX
Chest pain on deep inspiration after fall few days ago. CT scan neg for any acute fracture. Pt with reproducible chest wall tenderness. Troponin neg. Pt takes Motrin prn and advise to continue 200 mg Motrin prn.

## 2021-04-10 NOTE — ED PROVIDER NOTE - PATIENT PORTAL LINK FT
You can access the FollowMyHealth Patient Portal offered by [facility] by registering at the following website: [email or web address]. By joining [email or web address]’s FollowMyHealth portal, you will also be able to view your health information using other applications (apps) compatible with our system.

## 2021-04-10 NOTE — ED PROVIDER NOTE - PHYSICAL EXAMINATION
Physical Exam    Vital Signs: I have reviewed the initial vital signs.  Constitutional: well-nourished, appears stated age, no acute distress  Eyes: Conjunctiva pink, Sclera clear, PERRLA, EOMI, no entrapment  Head : NCAT  Cardiovascular: S1 and S2, regular rate, regular rhythm, well-perfused extremities, radial pulses equal and 2+  Respiratory: unlabored respiratory effort, clear to auscultation bilaterally no wheezing, rales and rhonchi, reproducible left sided chest wall tenderness, no ecchymosis  Gastrointestinal: soft, non-tender abdomen, no pulsatile mass,  Musculoskeletal: supple neck, no clavicular tenderness/crepitus no lower extremity edema, no midline tenderness, no paraspinal tenderness, no gross bony deformities or swelling. No anatomical snuffbox tenderness, full rom of upper and lower extremities   Integumentary: warm, dry, no rash  Neurologic: awake, alert, non ataxic gait

## 2021-04-10 NOTE — ED PROVIDER NOTE - OBJECTIVE STATEMENT
87 y.o. pmh of hypothyroidism, presenting for chest soreness. Pt reports falling onto the lawn 2 days ago. Landed on his wrist and left side. Yesterday developed chest pain described as soreness between his ribs. Worsened by laughing, coughing. Today pain worsened to now exacerbated by small movements prompting him to come in. Denies cardiac hx. Deneis smoking or alcohol usage. Denies joint pain/ painful rom, swelling

## 2021-04-10 NOTE — ED PROVIDER NOTE - PROGRESS NOTE DETAILS
Pt accepted from Dr. Caldwell. s\p fall a few days ago and since then chest pain. Pt seen and has tenderness to the anterior chest. Pt states tylenol bother him. Want to take motrin. He is on his way to CT scan. Will medicate after return. Patient feels well, ready for dc

## 2021-04-10 NOTE — ED PROVIDER NOTE - WR INTERPRETATION 1
Patient has appt to establish with pain management provider scheduled 12/30. PDMP reviewed, both last filled 11/24   I will send a #7 days supply to bridge until that appointment.
c/o neck and back pain, and inability to get OOB
chronic changes.

## 2021-04-10 NOTE — ED PROVIDER NOTE - NS ED ROS FT
Constitutional: (-) fever (-) chills  Eyes/ENT: (-) blurry vision, (-) epistaxis  Cardiovascular: (+) chest pain, (-) syncope  Respiratory: (-) cough, (-) shortness of breath  Gastrointestinal: (-) vomiting, (-) diarrhea  Musculoskeletal: (-) neck pain, (-) back pain, (-) joint pain  Integumentary: (-) rash, (-) edema  Neurological: (-) headache, (-) altered mental status (-) dizziness (-) difficulty ambulating

## 2021-06-03 NOTE — PROGRESS NOTE ADULT - PROBLEM SELECTOR PLAN 1
Pt has hx of seizures, unsure of last seizure. Pt reports starting Amberen for menopausal symptoms x1 week ago.    Verification of diagnosis/tx was provided for [facility] for Breast cancer  financial momo ana;ication as last step of process prior to dispersement.   likely has gastroenteritis  f/u stool studies   c/w IVF   c/w flagyl for now

## 2021-10-31 ENCOUNTER — EMERGENCY (EMERGENCY)
Facility: HOSPITAL | Age: 86
LOS: 0 days | Discharge: HOME | End: 2021-10-31
Attending: STUDENT IN AN ORGANIZED HEALTH CARE EDUCATION/TRAINING PROGRAM | Admitting: STUDENT IN AN ORGANIZED HEALTH CARE EDUCATION/TRAINING PROGRAM
Payer: MEDICARE

## 2021-10-31 VITALS
HEART RATE: 84 BPM | WEIGHT: 149.91 LBS | RESPIRATION RATE: 18 BRPM | SYSTOLIC BLOOD PRESSURE: 137 MMHG | OXYGEN SATURATION: 98 % | DIASTOLIC BLOOD PRESSURE: 65 MMHG | TEMPERATURE: 97 F | HEIGHT: 68 IN

## 2021-10-31 VITALS
OXYGEN SATURATION: 99 % | HEART RATE: 64 BPM | SYSTOLIC BLOOD PRESSURE: 155 MMHG | RESPIRATION RATE: 17 BRPM | DIASTOLIC BLOOD PRESSURE: 64 MMHG

## 2021-10-31 DIAGNOSIS — Z90.49 ACQUIRED ABSENCE OF OTHER SPECIFIED PARTS OF DIGESTIVE TRACT: Chronic | ICD-10-CM

## 2021-10-31 DIAGNOSIS — K62.89 OTHER SPECIFIED DISEASES OF ANUS AND RECTUM: ICD-10-CM

## 2021-10-31 DIAGNOSIS — K21.9 GASTRO-ESOPHAGEAL REFLUX DISEASE WITHOUT ESOPHAGITIS: ICD-10-CM

## 2021-10-31 DIAGNOSIS — E03.9 HYPOTHYROIDISM, UNSPECIFIED: ICD-10-CM

## 2021-10-31 DIAGNOSIS — K59.00 CONSTIPATION, UNSPECIFIED: ICD-10-CM

## 2021-10-31 DIAGNOSIS — R30.0 DYSURIA: ICD-10-CM

## 2021-10-31 PROCEDURE — 99284 EMERGENCY DEPT VISIT MOD MDM: CPT

## 2021-10-31 RX ORDER — MINERAL OIL
133 OIL (ML) MISCELLANEOUS ONCE
Refills: 0 | Status: COMPLETED | OUTPATIENT
Start: 2021-10-31 | End: 2021-10-31

## 2021-10-31 RX ADMIN — Medication 133 MILLILITER(S): at 14:59

## 2021-10-31 NOTE — ED PROVIDER NOTE - PATIENT PORTAL LINK FT
You can access the FollowMyHealth Patient Portal offered by [facility] by registering at the following website: [email or web address]. By joining [employer]’s FollowMyHealth portal, you will also be able to view your health information using other applications (apps) compatible with our system.

## 2021-10-31 NOTE — ED PROVIDER NOTE - ATTENDING CONTRIBUTION TO CARE
88 y.o. M with PMH of constipation and hemorrhoids with constipation for 2 days. Pt reports having incontinence due to the constipation, denies any abdominal pain no nausea/vomitting, no fever. mineral oil.

## 2021-10-31 NOTE — ED PROVIDER NOTE - OBJECTIVE STATEMENT
Patient c/o constipation 2 days, No N/V, no abdominal pain, + rectal pain, Taking OTC meds without relief,

## 2022-05-19 ENCOUNTER — APPOINTMENT (OUTPATIENT)
Dept: GASTROENTEROLOGY | Facility: CLINIC | Age: 87
End: 2022-05-19
Payer: COMMERCIAL

## 2022-05-19 DIAGNOSIS — Z78.9 OTHER SPECIFIED HEALTH STATUS: ICD-10-CM

## 2022-05-19 PROCEDURE — 99443: CPT

## 2022-05-19 RX ORDER — PANTOPRAZOLE SODIUM 40 MG/10ML
40 INJECTION, POWDER, FOR SOLUTION INTRAVENOUS
Refills: 0 | Status: ACTIVE | COMMUNITY

## 2022-05-19 NOTE — HISTORY OF PRESENT ILLNESS
[Home] : at home, [unfilled] , at the time of the visit. 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 Patient feels well, going for CT this Saturday.

## 2022-05-19 NOTE — ASSESSMENT
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 Patient feels well, going for CT this Saturday. 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

## 2022-05-20 ENCOUNTER — NON-APPOINTMENT (OUTPATIENT)
Age: 87
End: 2022-05-20

## 2022-05-31 ENCOUNTER — LABORATORY RESULT (OUTPATIENT)
Age: 87
End: 2022-05-31

## 2022-06-03 ENCOUNTER — RESULT REVIEW (OUTPATIENT)
Age: 87
End: 2022-06-03

## 2022-06-03 ENCOUNTER — OUTPATIENT (OUTPATIENT)
Dept: OUTPATIENT SERVICES | Facility: HOSPITAL | Age: 87
LOS: 1 days | Discharge: HOME | End: 2022-06-03
Payer: MEDICARE

## 2022-06-03 ENCOUNTER — TRANSCRIPTION ENCOUNTER (OUTPATIENT)
Age: 87
End: 2022-06-03

## 2022-06-03 VITALS
OXYGEN SATURATION: 100 % | HEART RATE: 60 BPM | SYSTOLIC BLOOD PRESSURE: 126 MMHG | RESPIRATION RATE: 16 BRPM | DIASTOLIC BLOOD PRESSURE: 64 MMHG

## 2022-06-03 VITALS
WEIGHT: 160.06 LBS | RESPIRATION RATE: 18 BRPM | TEMPERATURE: 97 F | HEART RATE: 72 BPM | SYSTOLIC BLOOD PRESSURE: 137 MMHG | HEIGHT: 68.5 IN | DIASTOLIC BLOOD PRESSURE: 82 MMHG

## 2022-06-03 DIAGNOSIS — Z90.49 ACQUIRED ABSENCE OF OTHER SPECIFIED PARTS OF DIGESTIVE TRACT: Chronic | ICD-10-CM

## 2022-06-03 PROCEDURE — 88312 SPECIAL STAINS GROUP 1: CPT | Mod: 26

## 2022-06-03 PROCEDURE — 43259 EGD US EXAM DUODENUM/JEJUNUM: CPT

## 2022-06-03 PROCEDURE — 43239 EGD BIOPSY SINGLE/MULTIPLE: CPT | Mod: XU

## 2022-06-03 PROCEDURE — 88305 TISSUE EXAM BY PATHOLOGIST: CPT | Mod: 26

## 2022-06-03 RX ORDER — PANTOPRAZOLE SODIUM 20 MG/1
0 TABLET, DELAYED RELEASE ORAL
Qty: 0 | Refills: 0 | DISCHARGE

## 2022-06-03 RX ORDER — TAMSULOSIN HYDROCHLORIDE 0.4 MG/1
1 CAPSULE ORAL
Qty: 0 | Refills: 0 | DISCHARGE

## 2022-06-03 NOTE — H&P ADULT - HISTORY OF PRESENT ILLNESS
Patient presents for EGD EUS for staging of Esophageal CA arising from Ogden's/ Seen and scoped prior by Dr. Dang

## 2022-06-03 NOTE — ASU DISCHARGE PLAN (ADULT/PEDIATRIC) - NS MD DC FALL RISK RISK
For information on Fall & Injury Prevention, visit: [email or web address] OR  [email or web address] OR  https://www.cdc.gov/steadi/patient.html

## 2022-06-03 NOTE — CHART NOTE - NSCHARTNOTEFT_GEN_A_CORE
PACU ANESTHESIA ADMISSION NOTE      Procedure:   Post op diagnosis:      ____  Intubated  TV:______       Rate: ______      FiO2: ______    __x__  Patent Airway    __x__  Full return of protective reflexes    __x__  Full recovery from anesthesia / back to baseline     Vitals:   T:  97.3         R: 16                 BP:    116/58              Sat:   99%                P: 58      Mental Status:  __x__ Awake   __x___ Alert   _____ Drowsy   _____ Sedated    Nausea/Vomiting:  __x__ NO  ______Yes,   See Post - Op Orders          Pain Scale (0-10):  __0___    Treatment: ____ None    ___x_ See Post - Op/PCA Orders    Post - Operative Fluids:   ____ Oral   __x__ See Post - Op Orders    Plan: Discharge:   __x__Home       _____Floor     _____Critical Care    _____  Other:_________________    Comments:  pt tolerated procedure well, pt transferred to PACU and report was given to PACU RN, vital signs are stable and pt shows no signs of distress. no anesthesia complications, discharge pt when criteria met.

## 2022-06-03 NOTE — ASU PATIENT PROFILE, ADULT - FALL HARM RISK - HARM RISK INTERVENTIONS

## 2022-06-03 NOTE — ASU PREOP CHECKLIST - BOWEL PREP
n/a Paramedian Forehead Flap Text: A decision was made to reconstruct the defect utilizing an interpolation axial flap and a staged reconstruction.  A telfa template was made of the defect.  This telfa template was then used to outline the paramedian forehead pedicle flap.  The donor area for the pedicle flap was then injected with anesthesia.  The flap was excised through the skin and subcutaneous tissue down to the layer of the underlying musculature.  The pedicle flap was carefully excised within this deep plane to maintain its blood supply.  The edges of the donor site were undermined.   The donor site was closed in a primary fashion.  The pedicle was then rotated into position and sutured.  Once the tube was sutured into place, adequate blood supply was confirmed with blanching and refill.  The pedicle was then wrapped with xeroform gauze and dressed appropriately with a telfa and gauze bandage to ensure continued blood supply and protect the attached pedicle.

## 2022-06-03 NOTE — PRE-ANESTHESIA EVALUATION ADULT - NSANTHRISKNONERD_GEN_ALL_CORE
No risk alerts present Topical Clindamycin Counseling: Patient counseled that this medication may cause skin irritation or allergic reactions.  In the event of skin irritation, the patient was advised to reduce the amount of the drug applied or use it less frequently.   The patient verbalized understanding of the proper use and possible adverse effects of clindamycin.  All of the patient's questions and concerns were addressed.

## 2022-06-03 NOTE — H&P ADULT - ASSESSMENT
Plan EGD with EUS for staging of Esophageal CA arising from Ogden    Patient follows   GI: Dr. Dang  Radiation Onc: Dr. Ibarra   CT Surgery: Dr. Alen Mathew

## 2022-06-07 ENCOUNTER — APPOINTMENT (OUTPATIENT)
Dept: CARDIOTHORACIC SURGERY | Facility: CLINIC | Age: 87
End: 2022-06-07
Payer: MEDICARE

## 2022-06-07 ENCOUNTER — APPOINTMENT (OUTPATIENT)
Dept: CARDIOTHORACIC SURGERY | Facility: CLINIC | Age: 87
End: 2022-06-07

## 2022-06-07 VITALS
WEIGHT: 160 LBS | SYSTOLIC BLOOD PRESSURE: 124 MMHG | HEART RATE: 87 BPM | DIASTOLIC BLOOD PRESSURE: 69 MMHG | TEMPERATURE: 97.9 F | BODY MASS INDEX: 24.25 KG/M2 | OXYGEN SATURATION: 93 % | HEIGHT: 68 IN | RESPIRATION RATE: 14 BRPM

## 2022-06-07 DIAGNOSIS — K22.89 OTHER SPECIFIED DISEASE OF ESOPHAGUS: ICD-10-CM

## 2022-06-07 DIAGNOSIS — K22.70 BARRETT'S ESOPHAGUS WITHOUT DYSPLASIA: ICD-10-CM

## 2022-06-07 DIAGNOSIS — Z90.49 ACQUIRED ABSENCE OF OTHER SPECIFIED PARTS OF DIGESTIVE TRACT: ICD-10-CM

## 2022-06-07 DIAGNOSIS — E03.9 HYPOTHYROIDISM, UNSPECIFIED: ICD-10-CM

## 2022-06-07 DIAGNOSIS — C15.9 MALIGNANT NEOPLASM OF ESOPHAGUS, UNSPECIFIED: ICD-10-CM

## 2022-06-07 DIAGNOSIS — Z87.891 PERSONAL HISTORY OF NICOTINE DEPENDENCE: ICD-10-CM

## 2022-06-07 DIAGNOSIS — Z84.89 FAMILY HISTORY OF OTHER SPECIFIED CONDITIONS: ICD-10-CM

## 2022-06-07 DIAGNOSIS — K29.50 UNSPECIFIED CHRONIC GASTRITIS WITHOUT BLEEDING: ICD-10-CM

## 2022-06-07 DIAGNOSIS — C16.0 MALIGNANT NEOPLASM OF CARDIA: ICD-10-CM

## 2022-06-07 LAB — SURGICAL PATHOLOGY STUDY: SIGNIFICANT CHANGE UP

## 2022-06-07 PROCEDURE — 99202 OFFICE O/P NEW SF 15 MIN: CPT

## 2022-06-07 RX ORDER — THYROID, PORCINE 90 MG/1
TABLET ORAL
Refills: 0 | Status: ACTIVE | COMMUNITY

## 2022-06-07 NOTE — PHYSICAL EXAM
[General Appearance - Alert] : alert [General Appearance - In No Acute Distress] : in no acute distress [General Appearance - Well Nourished] : well nourished [Sclera] : the sclera and conjunctiva were normal [PERRL With Normal Accommodation] : pupils were equal in size, round, and reactive to light [Extraocular Movements] : extraocular movements were intact [Outer Ear] : the ears and nose were normal in appearance [Neck Appearance] : the appearance of the neck was normal [Neck Cervical Mass (___cm)] : no neck mass was observed [Respiration, Rhythm And Depth] : normal respiratory rhythm and effort [Exaggerated Use Of Accessory Muscles For Inspiration] : no accessory muscle use [Apical Impulse] : the apical impulse was normal [Heart Rate And Rhythm] : heart rate was normal and rhythm regular [Heart Sounds] : normal S1 and S2 [Bowel Sounds] : normal bowel sounds [Abdomen Soft] : soft [Abdomen Tenderness] : non-tender [Abnormal Walk] : normal gait [Nail Clubbing] : no clubbing  or cyanosis of the fingernails [Musculoskeletal - Swelling] : no joint swelling seen [Skin Color & Pigmentation] : normal skin color and pigmentation [Skin Turgor] : normal skin turgor [] : no rash [Cranial Nerves] : cranial nerves 2-12 were intact [Deep Tendon Reflexes (DTR)] : deep tendon reflexes were 2+ and symmetric [Sensation] : the sensory exam was normal to light touch and pinprick [Oriented To Time, Place, And Person] : oriented to person, place, and time [Impaired Insight] : insight and judgment were intact [Affect] : the affect was normal

## 2022-06-13 NOTE — HISTORY OF PRESENT ILLNESS
[FreeTextEntry1] : Mr. MAURIZIO CHASE is a 89 year M, former smoker, that arrives today for a consultation for Adenocarcinoma of GE Junction.  The patient had an EGD with Dr. 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  S/P EGD/EUS on 6/3/2022 which showed a T2 N2 Adenocarcinoma of the esophagus.  PMH GERD, Hiatal hernia, and Hypothyroidism.  Here today to discuss options for his Esophageal Cancer.\par \par He endorses no dysphagia or weight loss.  Currently tolerating all consistency of food.  He occasionally has feelings of food stuck in the mid chest which prompted workup.  He has had GERD for many years and a known hiatal hernia\par \par ECOG 1\par Ambulates with can d./t back pain\par Lives with wife, no aide\par Former smoker- 1 PPD X20 years- quit 40s years ago\par COVID History- Never had COVID, Vaccinated + Boosted\par Denies major psychiatric history\par Retired [employer]\par \par Their Healthcare team is as follows:\par PMD: Dr. Israel\par Gastroenterologist: Dr. Dang/Dr. Gore

## 2022-06-13 NOTE — REVIEW OF SYSTEMS
[As Noted in HPI] : as noted in HPI [Negative] : Heme/Lymph [Fever] : no fever [Chills] : no chills [Feeling Poorly] : not feeling poorly [Feeling Tired] : not feeling tired [Heart Rate Is Slow] : the heart rate was not slow [Heart Rate Is Fast] : the heart rate was not fast [Chest Pain] : no chest pain [Palpitations] : no palpitations [Shortness Of Breath] : no shortness of breath [Wheezing] : no wheezing [Cough] : no cough [SOB on Exertion] : no shortness of breath during exertion [Proptosis] : no proptosis [Hot Flashes] : no hot flashes [Muscle Weakness] : no muscle weakness [Erectile Dysfunction] : no erectile dysfunction

## 2022-06-13 NOTE — ASSESSMENT
[FreeTextEntry1] : Mr. MAURIZIO CHASE is a 89 year M, former smoker, that arrives today for a consultation for Adenocarcinoma of GE Junction.  The patient had an EGD with Dr. 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  S/P EGD/EUS on 6/3/2022 which showed a T2 N2 Adenocarcinoma of the esophagus.  Here today to discuss options for his Esophageal Cancer.\par \par Plan:\par Locally advanced adenocarcinoma of the EG Junction tumor\par Has PET authorized with Dr. Palafox (Rad Onc) in [facility]\par He will have the PET in [facility]\par F/U after PET/CT for discussion of potential Endoscopic Submucosal Dissection\par \par I, Stephania [facility]-BC, am acting as scribe for Dr.Villa Mathew\par I, Enmanuel Mathew saw, examined and reviewed the diagnostic images on patient:  MAURIZIO CHASE on 06/07/2022 and agreed with my Nurse Practitioner's clinical note, physical exam findings and treatment plan.\par Mr. Chase presented to the office referred by Dr. Gore with recent diagnosis of distal esophageal cancer. Patient with mild dysphagia, no significant weight loss. EUS: T2N2. PET/CT pending. I had a lengthy discussion with the patient and recommended chemoradiation as his best chance to achieve a complete response, I do not believe he is a surgical candidate due to his functional condition. Patient is hesitant to undergo chemotherapy and has seen a radiation oncologist and looking into only radiotherapy. I advised him to see an oncologist and then make a decision.  \par

## 2022-06-15 ENCOUNTER — APPOINTMENT (OUTPATIENT)
Dept: HEMATOLOGY ONCOLOGY | Facility: CLINIC | Age: 87
End: 2022-06-15

## 2022-06-16 ENCOUNTER — NON-APPOINTMENT (OUTPATIENT)
Age: 87
End: 2022-06-16

## 2022-06-22 ENCOUNTER — NON-APPOINTMENT (OUTPATIENT)
Age: 87
End: 2022-06-22

## 2022-06-23 ENCOUNTER — NON-APPOINTMENT (OUTPATIENT)
Age: 87
End: 2022-06-23

## 2022-06-27 ENCOUNTER — OUTPATIENT (OUTPATIENT)
Dept: OUTPATIENT SERVICES | Facility: HOSPITAL | Age: 87
LOS: 1 days | Discharge: HOME | End: 2022-06-27

## 2022-06-27 ENCOUNTER — NON-APPOINTMENT (OUTPATIENT)
Age: 87
End: 2022-06-27

## 2022-06-27 DIAGNOSIS — R93.422 ABNORMAL RADIOLOGIC FINDINGS ON DIAGNOSTIC IMAGING OF LEFT KIDNEY: ICD-10-CM

## 2022-06-27 DIAGNOSIS — Z90.49 ACQUIRED ABSENCE OF OTHER SPECIFIED PARTS OF DIGESTIVE TRACT: Chronic | ICD-10-CM

## 2022-06-27 PROCEDURE — 74183 MRI ABD W/O CNTR FLWD CNTR: CPT | Mod: 26,MH

## 2022-07-01 ENCOUNTER — NON-APPOINTMENT (OUTPATIENT)
Age: 87
End: 2022-07-01

## 2022-07-13 ENCOUNTER — APPOINTMENT (OUTPATIENT)
Dept: CARDIOTHORACIC SURGERY | Facility: HOSPITAL | Age: 87
End: 2022-07-13

## 2022-07-14 ENCOUNTER — NON-APPOINTMENT (OUTPATIENT)
Age: 87
End: 2022-07-14

## 2022-07-29 ENCOUNTER — APPOINTMENT (OUTPATIENT)
Dept: HEMATOLOGY ONCOLOGY | Facility: CLINIC | Age: 87
End: 2022-07-29

## 2022-10-20 ENCOUNTER — INPATIENT (INPATIENT)
Facility: HOSPITAL | Age: 87
LOS: 1 days | Discharge: HOME | End: 2022-10-22
Attending: INTERNAL MEDICINE | Admitting: INTERNAL MEDICINE

## 2022-10-20 VITALS
TEMPERATURE: 96 F | HEIGHT: 68.5 IN | RESPIRATION RATE: 20 BRPM | HEART RATE: 66 BPM | SYSTOLIC BLOOD PRESSURE: 167 MMHG | OXYGEN SATURATION: 97 % | DIASTOLIC BLOOD PRESSURE: 72 MMHG | WEIGHT: 160.06 LBS

## 2022-10-20 DIAGNOSIS — Z90.49 ACQUIRED ABSENCE OF OTHER SPECIFIED PARTS OF DIGESTIVE TRACT: Chronic | ICD-10-CM

## 2022-10-20 LAB
ALBUMIN SERPL ELPH-MCNC: 4.7 G/DL — SIGNIFICANT CHANGE UP (ref 3.5–5.2)
ALP SERPL-CCNC: 79 U/L — SIGNIFICANT CHANGE UP (ref 30–115)
ALT FLD-CCNC: 21 U/L — SIGNIFICANT CHANGE UP (ref 0–41)
ANION GAP SERPL CALC-SCNC: 10 MMOL/L — SIGNIFICANT CHANGE UP (ref 7–14)
APPEARANCE UR: CLEAR — SIGNIFICANT CHANGE UP
AST SERPL-CCNC: 22 U/L — SIGNIFICANT CHANGE UP (ref 0–41)
BASOPHILS # BLD AUTO: 0.05 K/UL — SIGNIFICANT CHANGE UP (ref 0–0.2)
BASOPHILS NFR BLD AUTO: 0.5 % — SIGNIFICANT CHANGE UP (ref 0–1)
BILIRUB SERPL-MCNC: 0.4 MG/DL — SIGNIFICANT CHANGE UP (ref 0.2–1.2)
BILIRUB UR-MCNC: NEGATIVE — SIGNIFICANT CHANGE UP
BUN SERPL-MCNC: 39 MG/DL — HIGH (ref 10–20)
CALCIUM SERPL-MCNC: 9.5 MG/DL — SIGNIFICANT CHANGE UP (ref 8.4–10.5)
CHLORIDE SERPL-SCNC: 105 MMOL/L — SIGNIFICANT CHANGE UP (ref 98–110)
CO2 SERPL-SCNC: 26 MMOL/L — SIGNIFICANT CHANGE UP (ref 17–32)
COLOR SPEC: YELLOW — SIGNIFICANT CHANGE UP
CREAT SERPL-MCNC: 1.3 MG/DL — SIGNIFICANT CHANGE UP (ref 0.7–1.5)
DIFF PNL FLD: NEGATIVE — SIGNIFICANT CHANGE UP
EGFR: 53 ML/MIN/1.73M2 — LOW
EOSINOPHIL # BLD AUTO: 0.25 K/UL — SIGNIFICANT CHANGE UP (ref 0–0.7)
EOSINOPHIL NFR BLD AUTO: 2.3 % — SIGNIFICANT CHANGE UP (ref 0–8)
GLUCOSE SERPL-MCNC: 94 MG/DL — SIGNIFICANT CHANGE UP (ref 70–99)
GLUCOSE UR QL: NEGATIVE MG/DL — SIGNIFICANT CHANGE UP
HCT VFR BLD CALC: 39.3 % — LOW (ref 42–52)
HGB BLD-MCNC: 13.1 G/DL — LOW (ref 14–18)
IMM GRANULOCYTES NFR BLD AUTO: 0.7 % — HIGH (ref 0.1–0.3)
KETONES UR-MCNC: NEGATIVE — SIGNIFICANT CHANGE UP
LEUKOCYTE ESTERASE UR-ACNC: NEGATIVE — SIGNIFICANT CHANGE UP
LYMPHOCYTES # BLD AUTO: 1.73 K/UL — SIGNIFICANT CHANGE UP (ref 1.2–3.4)
LYMPHOCYTES # BLD AUTO: 16 % — LOW (ref 20.5–51.1)
MAGNESIUM SERPL-MCNC: 2.1 MG/DL — SIGNIFICANT CHANGE UP (ref 1.8–2.4)
MCHC RBC-ENTMCNC: 30.1 PG — SIGNIFICANT CHANGE UP (ref 27–31)
MCHC RBC-ENTMCNC: 33.3 G/DL — SIGNIFICANT CHANGE UP (ref 32–37)
MCV RBC AUTO: 90.3 FL — SIGNIFICANT CHANGE UP (ref 80–94)
MONOCYTES # BLD AUTO: 1.1 K/UL — HIGH (ref 0.1–0.6)
MONOCYTES NFR BLD AUTO: 10.2 % — HIGH (ref 1.7–9.3)
NEUTROPHILS # BLD AUTO: 7.57 K/UL — HIGH (ref 1.4–6.5)
NEUTROPHILS NFR BLD AUTO: 70.3 % — SIGNIFICANT CHANGE UP (ref 42.2–75.2)
NITRITE UR-MCNC: NEGATIVE — SIGNIFICANT CHANGE UP
NRBC # BLD: 0 /100 WBCS — SIGNIFICANT CHANGE UP (ref 0–0)
NT-PROBNP SERPL-SCNC: 1229 PG/ML — HIGH (ref 0–300)
PH UR: 6 — SIGNIFICANT CHANGE UP (ref 5–8)
PLATELET # BLD AUTO: 195 K/UL — SIGNIFICANT CHANGE UP (ref 130–400)
POTASSIUM SERPL-MCNC: 4.8 MMOL/L — SIGNIFICANT CHANGE UP (ref 3.5–5)
POTASSIUM SERPL-SCNC: 4.8 MMOL/L — SIGNIFICANT CHANGE UP (ref 3.5–5)
PROT SERPL-MCNC: 7 G/DL — SIGNIFICANT CHANGE UP (ref 6–8)
PROT UR-MCNC: NEGATIVE MG/DL — SIGNIFICANT CHANGE UP
RBC # BLD: 4.35 M/UL — LOW (ref 4.7–6.1)
RBC # FLD: 13.9 % — SIGNIFICANT CHANGE UP (ref 11.5–14.5)
SARS-COV-2 RNA SPEC QL NAA+PROBE: DETECTED
SODIUM SERPL-SCNC: 141 MMOL/L — SIGNIFICANT CHANGE UP (ref 135–146)
SP GR SPEC: 1.01 — SIGNIFICANT CHANGE UP (ref 1.01–1.03)
TROPONIN T SERPL-MCNC: <0.01 NG/ML — SIGNIFICANT CHANGE UP
UROBILINOGEN FLD QL: 0.2 MG/DL — SIGNIFICANT CHANGE UP
WBC # BLD: 10.78 K/UL — SIGNIFICANT CHANGE UP (ref 4.8–10.8)
WBC # FLD AUTO: 10.78 K/UL — SIGNIFICANT CHANGE UP (ref 4.8–10.8)

## 2022-10-20 PROCEDURE — 71045 X-RAY EXAM CHEST 1 VIEW: CPT | Mod: 26

## 2022-10-20 PROCEDURE — 99285 EMERGENCY DEPT VISIT HI MDM: CPT

## 2022-10-20 PROCEDURE — 70450 CT HEAD/BRAIN W/O DYE: CPT | Mod: 26,MA

## 2022-10-20 PROCEDURE — 93010 ELECTROCARDIOGRAM REPORT: CPT

## 2022-10-20 RX ORDER — PANTOPRAZOLE SODIUM 20 MG/1
2 TABLET, DELAYED RELEASE ORAL
Qty: 0 | Refills: 0 | DISCHARGE

## 2022-10-20 RX ORDER — LANOLIN ALCOHOL/MO/W.PET/CERES
3 CREAM (GRAM) TOPICAL AT BEDTIME
Refills: 0 | Status: DISCONTINUED | OUTPATIENT
Start: 2022-10-20 | End: 2022-10-22

## 2022-10-20 RX ORDER — SODIUM CHLORIDE 9 MG/ML
500 INJECTION INTRAMUSCULAR; INTRAVENOUS; SUBCUTANEOUS ONCE
Refills: 0 | Status: COMPLETED | OUTPATIENT
Start: 2022-10-20 | End: 2022-10-20

## 2022-10-20 RX ORDER — THYROID 120 MG
1 TABLET ORAL
Qty: 0 | Refills: 0 | DISCHARGE

## 2022-10-20 RX ORDER — ONDANSETRON 8 MG/1
4 TABLET, FILM COATED ORAL EVERY 8 HOURS
Refills: 0 | Status: DISCONTINUED | OUTPATIENT
Start: 2022-10-20 | End: 2022-10-22

## 2022-10-20 RX ORDER — ACETAMINOPHEN 500 MG
650 TABLET ORAL EVERY 6 HOURS
Refills: 0 | Status: DISCONTINUED | OUTPATIENT
Start: 2022-10-20 | End: 2022-10-22

## 2022-10-20 RX ADMIN — SODIUM CHLORIDE 500 MILLILITER(S): 9 INJECTION INTRAMUSCULAR; INTRAVENOUS; SUBCUTANEOUS at 20:39

## 2022-10-20 NOTE — ED PROVIDER NOTE - ATTENDING APP SHARED VISIT CONTRIBUTION OF CARE
89y male denies PMH (chart review reveals h/o asbestosis, ht, carotid stenosis, valvular disease, vertigo, and esophageal mass) bib wife for eval, 2 hours ago stood from seated position and felt dizzy described as sense of falling backwards with lightheadedness, now with mild residual h/a, no speech or swallow or visual disturbance, no numbness/weakness, no cp, palp, or sob, started "beta prostate" 2d ago for urinary frequency without burning, on exam vital signs appreciated, well appearing, head nc/at, perrla, EOMI no nystagmus, conj pink op clear neck supple cor rrr with II/VI leeann lungs cta abd snt no c/c/e pulses equal calves nontender neuro intact however unsteady on standing without orthostasis, will check ekg, labs, imaging, IVF, reassess 89y male denies PMH (chart review reveals h/o asbestosis, htn, carotid stenosis, valvular disease, vertigo, and esophageal mass) bib wife for eval, 2 hours ago stood from seated position and felt dizzy described as sense of falling backwards with lightheadedness, now with mild residual h/a, no speech or swallow or visual disturbance, no numbness/weakness, no cp, palp, or sob, started "beta prostate" 2d ago for urinary frequency without burning, on exam vital signs appreciated, well appearing, head nc/at, perrla, EOMI no nystagmus, conj pink op clear neck supple cor rrr with II/VI leeann lungs cta abd snt no c/c/e pulses equal calves nontender neuro intact however unsteady on standing without orthostasis, will check ekg, labs, imaging, IVF, reassess

## 2022-10-20 NOTE — H&P ADULT - HISTORY OF PRESENT ILLNESS
89 y/o male  with no significant past medical Hx . Pt not taking meds only vitamins. Pt states that he was watching TV when he wanted to go to the bathroom , but couldn't get up. Pt added that he sometimes feels light headed, but he was feeling dizzy at that time . Pt lives with his wife , but as per Pt she is too small and could not help me, so she call  EMS.

## 2022-10-20 NOTE — ED PROVIDER NOTE - CLINICAL SUMMARY MEDICAL DECISION MAKING FREE TEXT BOX
patient with postural dizziness, chart review reveals vascular disease, CTH with possible BPH, labs and studies reviewed, will admit

## 2022-10-20 NOTE — ED PROVIDER NOTE - OBJECTIVE STATEMENT
88 yo male ? BPH, (decline taking daily medicine)/ chronic back issue (baseline using cane to ambulate) present c/o dizziness/lightheaded started around 6pm while he was getting up from his sofa. reported feeling lightheaded and felt like he had to fall backward. reported he is feeling fine now while he is sitting in bed. Denies HA/slur speech/extremities weakness and numbness/ facial numbness and weakness. Denies Fever/chill/recent illness/coughing/chest pain/sob/abd pain/n/v/d/extremities pain/urinary sxs.

## 2022-10-20 NOTE — H&P ADULT - NSHPLABSRESULTS_GEN_ALL_CORE
13.1   10.78 )-----------( 195      ( 20 Oct 2022 20:35 )             39.3       10-20    141  |  105  |  39<H>  ----------------------------<  94  4.8   |  26  |  1.3    Ca    9.5      20 Oct 2022 20:35  Mg     2.1     10-20    TPro  7.0  /  Alb  4.7  /  TBili  0.4  /  DBili  x   /  AST  22  /  ALT  21  /  AlkPhos  79  10-20                      Lactate Trend      CARDIAC MARKERS ( 20 Oct 2022 20:35 )  x     / <0.01 ng/mL / x     / x     / x            CAPILLARY BLOOD GLUCOSE    Chead ct  FINDINGS:  The ventricles are dilated out of proportion to the cerebral sulci.    There is no acute intracranial hemorrhage, extra-axial fluid collections   or midline shift.    Bilateral subcortical and periventricular white matter hypodensities,   which likely represent chronic microvascular changes.    There is no depressed calvarial fracture. The mastoid air cells are   aerated.  The paranasal sinuses are aerated.    Beam hardening artifact is noted overlying the brain stem and posterior   fossa which is inherent to CT in this location.    IMPRESSION:    No acute intracranial hemorrhage, mass-effect or midline shift.    The ventricles are dilated out of proportion to the cerebral sulci.   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

## 2022-10-20 NOTE — ED PROVIDER NOTE - CARE PLAN
1 Principal Discharge DX:	Postural dizziness with near syncope  Secondary Diagnosis:	2019 novel coronavirus disease (COVID-19)

## 2022-10-20 NOTE — H&P ADULT - ASSESSMENT
88 y/o male  with no significant past medical Hx . Pt not taking meds only vitamins. Pt states that he was watching TV around 5 PM when he wanted to go to the bathroom , but couldn't get up. Pt added that he sometimes feels light headed, but he was feeling dizzy at that time . Pt lives with his wife , but as per Pt she is too small and could not help me, so she call  EMS.      DX dizziness:  tele  neurology    Hx of hydrocephalus:  neurology    prophylaxis Gi/VTE

## 2022-10-20 NOTE — ED ADULT TRIAGE NOTE - CHIEF COMPLAINT QUOTE
patient report episode of dizziness today after watching tv, patient had difficulty standing. patient recently started taking beta-prostate OTC medication

## 2022-10-20 NOTE — ED PROVIDER NOTE - PHYSICAL EXAMINATION
CONSTITUTIONAL: Well-appearing; well-nourished; in no apparent distress.   EYES: PERRL; EOM intact.   CARDIOVASCULAR: 3/6 systolic murmur best heard over the aortic region. Normal S1, S2; no murmurs, rubs, or gallops.   RESPIRATORY: Normal chest excursion with respiration; breath sounds clear and equal bilaterally; no wheezes, rhonchi, or rales.  GI/: Normal bowel sounds; non-distended; non-tender; no palpable organomegaly.   MS: No deformity to extremities.   SKIN: Normal for age and race; warm; dry; good turgor; no apparent lesions or exudate.   NEURO/PSYCH: A & O x 4; CN II-XII grossly unremarkable. no drifting. strength equal to b/l upper and lower extremities. speaking coherently. nml cerebellum test. unsteady on standing and walking (need assistant)

## 2022-10-21 LAB
ALBUMIN SERPL ELPH-MCNC: 4.6 G/DL — SIGNIFICANT CHANGE UP (ref 3.5–5.2)
ALP SERPL-CCNC: 77 U/L — SIGNIFICANT CHANGE UP (ref 30–115)
ALT FLD-CCNC: 20 U/L — SIGNIFICANT CHANGE UP (ref 0–41)
ANION GAP SERPL CALC-SCNC: 10 MMOL/L — SIGNIFICANT CHANGE UP (ref 7–14)
AST SERPL-CCNC: 23 U/L — SIGNIFICANT CHANGE UP (ref 0–41)
BILIRUB SERPL-MCNC: 0.8 MG/DL — SIGNIFICANT CHANGE UP (ref 0.2–1.2)
BUN SERPL-MCNC: 35 MG/DL — HIGH (ref 10–20)
CALCIUM SERPL-MCNC: 9.5 MG/DL — SIGNIFICANT CHANGE UP (ref 8.4–10.5)
CHLORIDE SERPL-SCNC: 107 MMOL/L — SIGNIFICANT CHANGE UP (ref 98–110)
CO2 SERPL-SCNC: 26 MMOL/L — SIGNIFICANT CHANGE UP (ref 17–32)
CREAT SERPL-MCNC: 1.2 MG/DL — SIGNIFICANT CHANGE UP (ref 0.7–1.5)
EGFR: 58 ML/MIN/1.73M2 — LOW
GLUCOSE SERPL-MCNC: 88 MG/DL — SIGNIFICANT CHANGE UP (ref 70–99)
POTASSIUM SERPL-MCNC: 4.6 MMOL/L — SIGNIFICANT CHANGE UP (ref 3.5–5)
POTASSIUM SERPL-SCNC: 4.6 MMOL/L — SIGNIFICANT CHANGE UP (ref 3.5–5)
PROT SERPL-MCNC: 6.9 G/DL — SIGNIFICANT CHANGE UP (ref 6–8)
SODIUM SERPL-SCNC: 143 MMOL/L — SIGNIFICANT CHANGE UP (ref 135–146)

## 2022-10-21 PROCEDURE — 93306 TTE W/DOPPLER COMPLETE: CPT | Mod: 26

## 2022-10-21 PROCEDURE — 93010 ELECTROCARDIOGRAM REPORT: CPT

## 2022-10-21 RX ORDER — INFLUENZA VIRUS VACCINE 15; 15; 15; 15 UG/.5ML; UG/.5ML; UG/.5ML; UG/.5ML
0.7 SUSPENSION INTRAMUSCULAR ONCE
Refills: 0 | Status: DISCONTINUED | OUTPATIENT
Start: 2022-10-21 | End: 2022-10-22

## 2022-10-21 RX ORDER — ENOXAPARIN SODIUM 100 MG/ML
40 INJECTION SUBCUTANEOUS EVERY 24 HOURS
Refills: 0 | Status: DISCONTINUED | OUTPATIENT
Start: 2022-10-21 | End: 2022-10-22

## 2022-10-21 RX ORDER — PANTOPRAZOLE SODIUM 20 MG/1
40 TABLET, DELAYED RELEASE ORAL
Refills: 0 | Status: DISCONTINUED | OUTPATIENT
Start: 2022-10-21 | End: 2022-10-22

## 2022-10-21 RX ORDER — REMDESIVIR 5 MG/ML
200 INJECTION INTRAVENOUS EVERY 24 HOURS
Refills: 0 | Status: COMPLETED | OUTPATIENT
Start: 2022-10-21 | End: 2022-10-21

## 2022-10-21 RX ORDER — MECLIZINE HCL 12.5 MG
12.5 TABLET ORAL EVERY 8 HOURS
Refills: 0 | Status: DISCONTINUED | OUTPATIENT
Start: 2022-10-21 | End: 2022-10-22

## 2022-10-21 RX ORDER — REMDESIVIR 5 MG/ML
100 INJECTION INTRAVENOUS EVERY 24 HOURS
Refills: 0 | Status: DISCONTINUED | OUTPATIENT
Start: 2022-10-22 | End: 2022-10-22

## 2022-10-21 RX ORDER — REMDESIVIR 5 MG/ML
INJECTION INTRAVENOUS
Refills: 0 | Status: DISCONTINUED | OUTPATIENT
Start: 2022-10-21 | End: 2022-10-22

## 2022-10-21 RX ORDER — SODIUM CHLORIDE 9 MG/ML
1000 INJECTION INTRAMUSCULAR; INTRAVENOUS; SUBCUTANEOUS
Refills: 0 | Status: DISCONTINUED | OUTPATIENT
Start: 2022-10-21 | End: 2022-10-22

## 2022-10-21 RX ADMIN — SODIUM CHLORIDE 50 MILLILITER(S): 9 INJECTION INTRAMUSCULAR; INTRAVENOUS; SUBCUTANEOUS at 10:54

## 2022-10-21 RX ADMIN — PANTOPRAZOLE SODIUM 40 MILLIGRAM(S): 20 TABLET, DELAYED RELEASE ORAL at 13:49

## 2022-10-21 RX ADMIN — REMDESIVIR 500 MILLIGRAM(S): 5 INJECTION INTRAVENOUS at 17:01

## 2022-10-21 RX ADMIN — ENOXAPARIN SODIUM 40 MILLIGRAM(S): 100 INJECTION SUBCUTANEOUS at 11:08

## 2022-10-21 NOTE — PROGRESS NOTE ADULT - SUBJECTIVE AND OBJECTIVE BOX
88yo male admitted with weakness and dizziness tested positive for covid hx hypothyroidism barrets esophagitis, aspestosis ,with recent  negative bx admitted with weakness and dizziness no fever or chills                          13.1   10.78 )-----------( 195      ( 20 Oct 2022 20:35 )             39.3   10-20    141  |  105  |  39<H>  ----------------------------<  94  4.8   |  26  |  1.3    Ca    9.5      20 Oct 2022 20:35  Mg     2.1     10-20    TPro  7.0  /  Alb  4.7  /  TBili  0.4  /  DBili  x   /  AST  22  /  ALT  21  /  AlkPhos  79  10-20    ICU Vital Signs Last 24 Hrs  T(C): 36.5 (21 Oct 2022 05:16), Max: 36.5 (21 Oct 2022 05:16)  T(F): 97.7 (21 Oct 2022 05:16), Max: 97.7 (21 Oct 2022 05:16)  HR: 66 (21 Oct 2022 05:16) (61 - 66)  BP: 151/65 (21 Oct 2022 05:16) (151/65 - 167/72)  BP(mean): --  ABP: --  ABP(mean): --  RR: 18 (21 Oct 2022 05:16) (18 - 20)  SpO2: 98% (21 Oct 2022 05:16) (96% - 98%)    HEENT no JVD neck supple  ungs no rhonchi good air entry  HT regular rate rythm, 2/6 sm  ABD soft nontender  EXT no C/C/E  Skin Vitiligo  neuro AandOx3 non focal  Pulses normal

## 2022-10-21 NOTE — PHYSICAL THERAPY INITIAL EVALUATION ADULT - LIVES WITH, PROFILE
Pt lives with wife in a private home with ~ 4 steps to enter with two handrails and level floor inside./spouse

## 2022-10-21 NOTE — CHART NOTE - NSCHARTNOTEFT_GEN_A_CORE
d/w Dr. Israel, dizziness likely 2/2 covid  VSS, neg orthostatic    - IVF NS 50  - ID consult for possible covid treatment (paxlovid vs rdv)  - PT consult  - anticipate for tomorrow

## 2022-10-21 NOTE — PATIENT PROFILE ADULT - FALL HARM RISK - HARM RISK INTERVENTIONS
Patient/Caregiver provided printed discharge information.
Assistance with ambulation/Assistance OOB with selected safe patient handling equipment/Communicate Risk of Fall with Harm to all staff/Discuss with provider need for PT consult/Monitor gait and stability/Provide patient with walking aids - walker, cane, crutches/Reinforce activity limits and safety measures with patient and family/Sit up slowly, dangle for a short time, stand at bedside before walking/Tailored Fall Risk Interventions/Visual Cue: Yellow wristband and red socks/Bed in lowest position, wheels locked, appropriate side rails in place/Call bell, personal items and telephone in reach/Instruct patient to call for assistance before getting out of bed or chair/Non-slip footwear when patient is out of bed/[place] to call system/Physically safe environment - no spills, clutter or unnecessary equipment/Purposeful Proactive Rounding/Room/bathroom lighting operational, light cord in reach

## 2022-10-21 NOTE — PATIENT PROFILE ADULT - HISTORY OF COVID-19 VACCINATION
[facility] Hospital Medicine  Progress Note    Patient Name: Radha Pritchett  MRN: [id number]  Patient Class: IP- Inpatient   Admission Date: 4/6/2017  Length of Stay: 15 days  Attending Physician: Yovani Arellano MD  Primary Care Provider: Ferny Iglesias MD        Subjective:     Principal Problem:Acute respiratory failure with hypercapnia    HPI:  Ms. Radha Pritchett is a 46 y.o. female with essential hypertension, chronic diastolic heart failure (LVEF 55-60% Dec 2014), moderate protein malnutrition who presents to [facility] ED with complaints of dyspnea today.  She was scheduled to undergo removal of a breast mass today but was found to be hypoxic.  She does report that she has been short of breath but cannot specify how long.  She has also had some non-productive coughing and some chills without fevers.  Further history is otherwise limited at this time.    Hospital Course:   was admitted with pneumonia and acutely decompensated and was intubated and placed on vent. Pt with UE weakness prior to presentation on this admission which may be contributing to inability to extubate. Neurology following and considering possible MG and patient on therapeutic trial of prednisone and Modafinil. Pt s/p bronch that was unrevealing on 4/11. Pt was extubated on 4/15 but was re-intubated later the same day.  Was febrile and stared on Zyvox and zosyn.  CXR shows possible infiltrate.  Strep growing on UCx.  Trach on 4/19 without complications.  4/21: Increased in respiratory rate.  Having trouble clearing secretions.      Interval History: Increased respiratory rate this morning.      Review of Systems   Unable to perform ROS: Intubated     Objective:     Vital Signs (Most Recent):  Temp: 98.2 °F (36.8 °C) (04/21/17 0720)  Pulse: 79 (04/21/17 1119)  Resp: (!) 9 (04/21/17 1119)  BP: (!) 92/54 (04/21/17 1000)  SpO2: 98 % (04/21/17 1119) Vital Signs (24h Range):  Temp:  [98.2 °F (36.8 °C)-99.7 °F (37.6  °C)] 98.2 °F (36.8 °C)  Pulse:  [75-98] 79  Resp:  [8-23] 9  SpO2:  [95 %-100 %] 98 %  BP: ()/(50-69) 92/54     Weight: 75.7 kg (166 lb 14.2 oz)  Body mass index is 46.93 kg/(m^2).    Intake/Output Summary (Last 24 hours) at 04/21/17 1300  Last data filed at 04/21/17 1100   Gross per 24 hour   Intake          2686.32 ml   Output             2185 ml   Net           501.32 ml      Physical Exam   Constitutional: She appears well-developed. No distress.   HENT:   ET tube in place   Eyes: Conjunctivae are normal. Right eye exhibits no discharge. Left eye exhibits no discharge.   Neck:   Trach in place   Cardiovascular: Normal rate, regular rhythm and normal heart sounds.    Pulmonary/Chest: No stridor.   Mechanically ventilated per trach.  Coarse BS bilaterally   Abdominal: Soft. Bowel sounds are normal.   Musculoskeletal: She exhibits no deformity.   Neurological:   Sedated   Skin: Skin is warm and dry.       Significant Labs:   BMP:     Recent Labs  Lab 04/21/17  0126   *      K 3.9      CO2 27   BUN 31*   CREATININE 0.9   CALCIUM 10.3     CBC:     Recent Labs  Lab 04/20/17  0330   WBC 8.68   HGB 11.0*   HCT 38.0            Assessment/Plan:      * Acute respiratory failure with hypercapnia  Mechanical ventilation  Weakness  Metabolic encephalopathy  Fever  Assessment and Plan:  Pt with respiratory failure that required intubation, possibly secondary to pneumonia vs edema, treated empirically with Rocephin/Azithromycin, ECHO with diastolic dysfunction but normal EF, and Pulm following for vent management. Family reported UE weakness for a few months prior to this admission and Neurology consulted, possible MG and patient undergoing trial of prednisone and pyridostigmine. This weakness likely limited extubation and necessitated reintubation; patient was extubated on 4/16 but had to be re-intubated that same day. Unclear source of encephalopathy and case discussed with Neurology, CT scan  of the head was unrevealing. Also, patient completed treatment with Azithromycin and was on Rocephin alone, but she spiked a fever overnight and antibiotics changed to Zosyn/Zyvox.   Trach on 4/19 without complications.  Remains on vent per trach.  Having trouble clearing secretions.  Will add Glycopyrrolate.   SW consulted for LTAC.        Pneumonia of both lungs due to infectious organism  Probably bacterial, but unable to determine.  The patient has a CURB-65 score of 0, and does not meet criteria for healthcare-associated pneumonia initially. CTA chest with bilateral airspace opacification concerning for pneumonia.  Oxygen saturations are improved but requiring a non-rebreather mask; then BIPAP and s/p intubation.  Blood culture NGTD.  Pt completed full course of Azithromycin, and was continued Rocephin until had spike of fever, Abx has been expanded  to Zosyn/Zyvox.  Will give one more day of ABx's.    Sepsis  This patient met criteria for sepsis given tachycardia, tachypnea, and pneumonia; there is no evidence of endo-organ dysfunction.  Blood cultures are NGTD and repeat studies done;  antibiotics as discussed above.  Strep in urine likely normal pennie.    Essential hypertension  BP well controlled.  Continue current management.    Chronic diastolic heart failure  Patient doesn't appear to be in acute heart failure; checked ECHO with EF=65% but with diastolic dysfunction.    Moderate protein malnutrition  Tolerating tube feeds.  Hyperglycemia--probably related to steroids.  Prednisone stopped.    Breast mass, left  Planned for surgery, seen by Dr. Sandoval (his patient) and plan to reschedule as outpatient. It is reportedly a hematoma by biopsy, but suspicion for CA not completely ruled out given paraneoplastic syndrome a consideration.      Hypokalemia  Replace as necessary.      Counseling regarding advanced directives and goals of care  Extensive conversations done with family. Pt to remain full code. Pt is  still  but estranged from the  and reportedly the grandmother is the major decision maker.       VTE Risk Mitigation         Ordered     enoxaparin injection 40 mg  Daily     Route:  Subcutaneous        04/06/17 2202     Medium Risk of VTE  Once      04/06/17 2202        Critical Care time spent > 35 minutes.       Yovani Arellano MD  Department of Hospital Medicine   [facility]   Yes

## 2022-10-21 NOTE — PROGRESS NOTE ADULT - ASSESSMENT
89 yo with weakness dehydration and dizziness likely due to covid infection  Monitor cardiac  echocardiogram  iv fluid 50 cc/hr  ID consult for Covid management  Gerd   pantoprazole

## 2022-10-21 NOTE — CONSULT NOTE ADULT - ASSESSMENT
ASSESSMENT  89 y/o male  with no significant past medical Hx who presents with lightheadednes    IMPRESSION  #COVID-19, Mild  #Dizziness/Lightheadedness  #Abx allergy: NKDA    RECOMMENDATIONS  - continue  mg x1, followed by 100 mg daily for 3 days for mild COVID (paxlovid not available inpatient)  - does not have to complete 3 days if planned for D/C and feeling improved  - monitor O2  - tele monitoring     Please call or message on Microsoft Teams if with any questions.  Spectra [phone]

## 2022-10-21 NOTE — CONSULT NOTE ADULT - SUBJECTIVE AND OBJECTIVE BOX
JENNIFERBARBI MAURIZIO  89y, Male  Allergy: No Known Allergies      CHIEF COMPLAINT: dizziness (21 Oct 2022 09:08)      LOS  1d    HPI:  89 y/o male  with no significant past medical Hx . Pt not taking meds only vitamins. Pt states that he was watching TV when he wanted to go to the bathroom , but couldn't get up. Pt added that he sometimes feels light headed, but he was feeling dizzy at that time . Pt lives with his wife , but as per Pt she is too small and could not help me, so she call  EMS.   (20 Oct 2022 22:36)      INFECTIOUS DISEASE HISTORY:  Hx  Reports episodes of light headed which started the day prior to admissiion.   Denies any coughing, shortness of breath.     PAST MEDICAL & SURGICAL HISTORY:  Hypothyroidism      Acid reflux      S/P cholecystectomy          FAMILY HISTORY  No pertinent family history in first degree relatives        SOCIAL HISTORY  Social History:  pt denies smoking, drug or etoh. (20 Oct 2022 22:36)        ROS  General: Denies rigors, nightsweats  HEENT: Denies headache, rhinorrhea, sore throat, eye pain  CV: Denies CP, palpitations  PULM: Denies wheezing, hemoptysis  GI: Denies hematemesis, hematochezia, melena  : Denies discharge, hematuria  MSK: Denies arthralgias, myalgias  SKIN: Denies rash, lesions  NEURO: Denies paresthesias, weakness  PSYCH: Denies depression, anxiety    VITALS:  T(F): 98, Max: 98 (10-21-22 @ 12:00)  HR: 65  BP: 120/58  RR: 17Vital Signs Last 24 Hrs  T(C): 36.7 (21 Oct 2022 12:00), Max: 36.7 (21 Oct 2022 12:00)  T(F): 98 (21 Oct 2022 12:00), Max: 98 (21 Oct 2022 12:00)  HR: 65 (21 Oct 2022 12:00) (61 - 66)  BP: 120/58 (21 Oct 2022 12:00) (120/58 - 167/72)  BP(mean): --  RR: 17 (21 Oct 2022 12:00) (17 - 20)  SpO2: 96% (21 Oct 2022 12:00) (96% - 98%)    Parameters below as of 21 Oct 2022 12:00  Patient On (Oxygen Delivery Method): room air        PHYSICAL EXAM:  Gen: NAD, resting in bed  HEENT: Normocephalic, atraumatic  Neck: supple, no lymphadenopathy  CV: Regular rate & regular rhythm  Lungs: decreased BS at bases, no fremitus  Abdomen: Soft, BS present  Ext: Warm, well perfused  Neuro: non focal, awake  Skin: no rash, no erythema  Lines: no phlebitis    TESTS & MEASUREMENTS:                        13.1   10.78 )-----------( 195      ( 20 Oct 2022 20:35 )             39.3     10    143  |  107  |  35<H>  ----------------------------<  88  4.6   |  26  |  1.2    Ca    9.5      21 Oct 2022 08:32  Mg     2.1     10-20    TPro  6.9  /  Alb  4.6  /  TBili  0.8  /  DBili  x   /  AST  23  /  ALT  20  /  AlkPhos  77  10-21      LIVER FUNCTIONS - ( 21 Oct 2022 08:32 )  Alb: 4.6 g/dL / Pro: 6.9 g/dL / ALK PHOS: 77 U/L / ALT: 20 U/L / AST: 23 U/L / GGT: x           Urinalysis Basic - ( 20 Oct 2022 21:19 )    Color: Yellow / Appearance: Clear / S.010 / pH: x  Gluc: x / Ketone: Negative  / Bili: Negative / Urobili: 0.2 mg/dL   Blood: x / Protein: Negative mg/dL / Nitrite: Negative   Leuk Esterase: Negative / RBC: x / WBC x   Sq Epi: x / Non Sq Epi: x / Bacteria: x              INFECTIOUS DISEASES TESTING  COVID-19 PCR: Detected (10-20-22 @ 20:00)      RADIOLOGY & ADDITIONAL TESTS:  I have personally reviewed the last Chest xray  CXR      CT      CARDIOLOGY TESTING  12 Lead ECG:   Ventricular Rate 64 BPM    Atrial Rate 64 BPM    P-R Interval 176 ms    QRS Duration 90 ms    Q-T Interval 386 ms    QTC Calculation(Bazett) 398 ms    P Axis 89 degrees    R Axis 18 degrees    T Axis 25 degrees    Diagnosis Line Normal sinus rhythm  Normal ECG    Confirmed by Juve Ramos ([id number]) on 10/21/2022 10:54:57 AM (10-21-22 @ 10:22)  12 Lead ECG:   Ventricular Rate 57 BPM    Atrial Rate 57 BPM    P-R Interval 176 ms    QRS Duration 94 ms    Q-T Interval 410 ms    QTC Calculation(Bazett) 399 ms    P Axis 63 degrees    R Axis 28 degrees    T Axis 28 degrees    Diagnosis Line Sinus bradycardia  Otherwise normal ECG    Confirmed by Juve Ramos ([id number]) on 10/21/2022 9:12:24 AM (10-20-22 @ 20:32)      MEDICATIONS  enoxaparin Injectable 40 SubCutaneous every 24 hours  influenza  Vaccine (HIGH DOSE) 0.7 IntraMuscular once  pantoprazole    Tablet 40 Oral before breakfast  remdesivir  IVPB  IV Intermittent   sodium chloride 0.9%. 1000 IV Continuous <Continuous>      Weight  Weight (kg): 72.6 (10-20-22 @ 19:58)    ANTIBIOTICS:  remdesivir  IVPB   IV Intermittent       ALLERGIES:  No Known Allergies

## 2022-10-21 NOTE — PHYSICAL THERAPY INITIAL EVALUATION ADULT - GENERAL OBSERVATIONS, REHAB EVAL
16:00-16:25. Chart reviewed; confirmed with RN to see the pt for PT. Pt ready for PT; received on a stretcher with no complain of pain and in NAD. +IV L UE, +tele, +pulse ox. Agreeable for PT evaluation.

## 2022-10-22 ENCOUNTER — TRANSCRIPTION ENCOUNTER (OUTPATIENT)
Age: 87
End: 2022-10-22

## 2022-10-22 VITALS — OXYGEN SATURATION: 97 %

## 2022-10-22 LAB
ALBUMIN SERPL ELPH-MCNC: 4.4 G/DL — SIGNIFICANT CHANGE UP (ref 3.5–5.2)
ALP SERPL-CCNC: 76 U/L — SIGNIFICANT CHANGE UP (ref 30–115)
ALT FLD-CCNC: 18 U/L — SIGNIFICANT CHANGE UP (ref 0–41)
ANION GAP SERPL CALC-SCNC: 9 MMOL/L — SIGNIFICANT CHANGE UP (ref 7–14)
APTT BLD: 36.9 SEC — SIGNIFICANT CHANGE UP (ref 27–39.2)
AST SERPL-CCNC: 27 U/L — SIGNIFICANT CHANGE UP (ref 0–41)
BASOPHILS # BLD AUTO: 0.08 K/UL — SIGNIFICANT CHANGE UP (ref 0–0.2)
BASOPHILS NFR BLD AUTO: 0.9 % — SIGNIFICANT CHANGE UP (ref 0–1)
BILIRUB SERPL-MCNC: 0.6 MG/DL — SIGNIFICANT CHANGE UP (ref 0.2–1.2)
BUN SERPL-MCNC: 31 MG/DL — HIGH (ref 10–20)
CALCIUM SERPL-MCNC: 9.3 MG/DL — SIGNIFICANT CHANGE UP (ref 8.4–10.5)
CHLORIDE SERPL-SCNC: 108 MMOL/L — SIGNIFICANT CHANGE UP (ref 98–110)
CO2 SERPL-SCNC: 22 MMOL/L — SIGNIFICANT CHANGE UP (ref 17–32)
CREAT SERPL-MCNC: 1.4 MG/DL — SIGNIFICANT CHANGE UP (ref 0.7–1.5)
CULTURE RESULTS: SIGNIFICANT CHANGE UP
EGFR: 48 ML/MIN/1.73M2 — LOW
EOSINOPHIL # BLD AUTO: 0.29 K/UL — SIGNIFICANT CHANGE UP (ref 0–0.7)
EOSINOPHIL NFR BLD AUTO: 3.3 % — SIGNIFICANT CHANGE UP (ref 0–8)
GLUCOSE SERPL-MCNC: 81 MG/DL — SIGNIFICANT CHANGE UP (ref 70–99)
HCT VFR BLD CALC: 42 % — SIGNIFICANT CHANGE UP (ref 42–52)
HGB BLD-MCNC: 14.3 G/DL — SIGNIFICANT CHANGE UP (ref 14–18)
IMM GRANULOCYTES NFR BLD AUTO: 0.8 % — HIGH (ref 0.1–0.3)
INR BLD: 1.05 RATIO — SIGNIFICANT CHANGE UP (ref 0.65–1.3)
LYMPHOCYTES # BLD AUTO: 1.44 K/UL — SIGNIFICANT CHANGE UP (ref 1.2–3.4)
LYMPHOCYTES # BLD AUTO: 16.3 % — LOW (ref 20.5–51.1)
MAGNESIUM SERPL-MCNC: 2.1 MG/DL — SIGNIFICANT CHANGE UP (ref 1.8–2.4)
MCHC RBC-ENTMCNC: 30.2 PG — SIGNIFICANT CHANGE UP (ref 27–31)
MCHC RBC-ENTMCNC: 34 G/DL — SIGNIFICANT CHANGE UP (ref 32–37)
MCV RBC AUTO: 88.8 FL — SIGNIFICANT CHANGE UP (ref 80–94)
MONOCYTES # BLD AUTO: 0.84 K/UL — HIGH (ref 0.1–0.6)
MONOCYTES NFR BLD AUTO: 9.5 % — HIGH (ref 1.7–9.3)
NEUTROPHILS # BLD AUTO: 6.11 K/UL — SIGNIFICANT CHANGE UP (ref 1.4–6.5)
NEUTROPHILS NFR BLD AUTO: 69.2 % — SIGNIFICANT CHANGE UP (ref 42.2–75.2)
NRBC # BLD: 0 /100 WBCS — SIGNIFICANT CHANGE UP (ref 0–0)
PLATELET # BLD AUTO: 161 K/UL — SIGNIFICANT CHANGE UP (ref 130–400)
POTASSIUM SERPL-MCNC: 4.7 MMOL/L — SIGNIFICANT CHANGE UP (ref 3.5–5)
POTASSIUM SERPL-SCNC: 4.7 MMOL/L — SIGNIFICANT CHANGE UP (ref 3.5–5)
PROT SERPL-MCNC: 6.8 G/DL — SIGNIFICANT CHANGE UP (ref 6–8)
PROTHROM AB SERPL-ACNC: 12 SEC — SIGNIFICANT CHANGE UP (ref 9.95–12.87)
RBC # BLD: 4.73 M/UL — SIGNIFICANT CHANGE UP (ref 4.7–6.1)
RBC # FLD: 13.8 % — SIGNIFICANT CHANGE UP (ref 11.5–14.5)
SODIUM SERPL-SCNC: 139 MMOL/L — SIGNIFICANT CHANGE UP (ref 135–146)
SPECIMEN SOURCE: SIGNIFICANT CHANGE UP
WBC # BLD: 8.83 K/UL — SIGNIFICANT CHANGE UP (ref 4.8–10.8)
WBC # FLD AUTO: 8.83 K/UL — SIGNIFICANT CHANGE UP (ref 4.8–10.8)

## 2022-10-22 RX ORDER — SODIUM CHLORIDE 9 MG/ML
250 INJECTION INTRAMUSCULAR; INTRAVENOUS; SUBCUTANEOUS ONCE
Refills: 0 | Status: COMPLETED | OUTPATIENT
Start: 2022-10-22 | End: 2022-10-22

## 2022-10-22 RX ADMIN — SODIUM CHLORIDE 250 MILLILITER(S): 9 INJECTION INTRAMUSCULAR; INTRAVENOUS; SUBCUTANEOUS at 11:32

## 2022-10-22 RX ADMIN — PANTOPRAZOLE SODIUM 40 MILLIGRAM(S): 20 TABLET, DELAYED RELEASE ORAL at 05:30

## 2022-10-22 NOTE — OCCUPATIONAL THERAPY INITIAL EVALUATION ADULT - PERTINENT HX OF CURRENT PROBLEM, REHAB EVAL
88 y/o M with no significant past medical Hx . Pt not taking meds only vitamins. Pt states that he was watching TV when he wanted to go to the bathroom, but couldn't get up. Pt added that he sometimes feels light headed, but he was feeling dizzy at that time.     CT head 10/20 No acute intracranial hemorrhage, mass-effect or midline shift. The ventricles are dilated out of proportion to the cerebral sulci. 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

## 2022-10-22 NOTE — DISCHARGE NOTE PROVIDER - HOSPITAL COURSE
Pt is an 89M w/o known PMH admitted for dizziness found to have COVID and orthostatic hypotension. He was started on IV fluids and monitored on tele. An echo was done and found to be mostly WNL. Pt also started on  RDV x3 days per ID. Pt stable and ready for d/c. Pt is an 89M w/o known PMH admitted for dizziness found to have COVID and orthostatic hypotension. He was started on IV fluids and monitored on tele. An echo was done and found to be mostly WNL. Pt also started on  RDV x3 days per ID. Pt stable and ready for d/c.   Admitted with weakness and Covid pcr positive Denies chest pain improved with hydration and antiviral treatment  ready for discharge home

## 2022-10-22 NOTE — DISCHARGE NOTE PROVIDER - NSDCCPCAREPLAN_GEN_ALL_CORE_FT
PRINCIPAL DISCHARGE DIAGNOSIS  Diagnosis: Postural dizziness with near syncope  Assessment and Plan of Treatment: You were found to have COVID-19 and also orthostatic hypotension.   You should keep yoursellf well hydrated, and also get up from laying down or sitting very slowly.   You were treated with IV fluids and anti-virals for COVID.   You should follow up with Dr. Israel within 1 week.      SECONDARY DISCHARGE DIAGNOSES  Diagnosis: 2019 novel coronavirus disease (COVID-19)  Assessment and Plan of Treatment:

## 2022-10-22 NOTE — PROGRESS NOTE ADULT - TIME BILLING
patient seen and examined case reviewed in its entirety discussed with PA, wife and patient for discharge today

## 2022-10-22 NOTE — OCCUPATIONAL THERAPY INITIAL EVALUATION ADULT - GENERAL OBSERVATIONS, REHAB EVAL
9:40-10:05; chart reviewed, ok to treat by Occupational Therapist as confirmed by RN Breana, Pt received semifowler +tele +RUE heplock in NAD. Pt denied dizziness and pain at rest, pt in agreement with OT IE.

## 2022-10-22 NOTE — PROGRESS NOTE ADULT - SUBJECTIVE AND OBJECTIVE BOX
88yo male admitted with weakness and dizziness tested positive for covid hx hypothyroidism barrets esophagitis, aspestosis ,with recent  negative bx admitted with weakness and dizziness no fever or chills Patient feeling better would like to be discharged home with wife able to ambulate                         14.3   8.83  )-----------( 161      ( 22 Oct 2022 07:18 )             42.0                         13.1   10.78 )-----------( 195      ( 20 Oct 2022 20:35 )             39.3 10-22    139  |  108  |  31<H>  ----------------------------<  81  4.7   |  22  |  1.4    Ca    9.3      22 Oct 2022 07:18  Mg     2.1     10-22    TPro  6.8  /  Alb  4.4  /  TBili  0.6  /  DBili  x   /  AST  27  /  ALT  18  /  AlkPhos  76  10-22    ICU Vital Signs Last 24 Hrs  T(C): 35.7 (22 Oct 2022 05:21), Max: 35.8 (21 Oct 2022 19:44)  T(F): 96.2 (22 Oct 2022 05:21), Max: 96.5 (21 Oct 2022 19:44)  HR: 59 (22 Oct 2022 05:21) (58 - 59)  BP: 148/67 (22 Oct 2022 05:21) (128/60 - 148/67)  RR: 18 (22 Oct 2022 05:21) (18 - 18)  SpO2: 97% (22 Oct 2022 05:40) (97% - 97%)    O2 Parameters below as of 22 Oct 2022 05:40  Patient On (Oxygen Delivery Method): room air            HEENT no JVD neck supple  lungs no rhonchi good air entry  HT regular rate rythm, 2/6 sm  ABD soft nontender  EXT no C/C/E  Skin Vitiligo no rash  neuro AandOx3 non focal  Pulses normal

## 2022-10-22 NOTE — DISCHARGE NOTE PROVIDER - CARE PROVIDER_API CALL
Arielle Israel)  Medicine  Critical Care  [address] [place] [address]  Phone: [phone]  Fax: [phone]  Follow Up Time: 1-3 days

## 2022-10-22 NOTE — DISCHARGE NOTE NURSING/CASE MANAGEMENT/SOCIAL WORK - PATIENT PORTAL LINK FT
You can access the FollowMyHealth Patient Portal offered by [facility] by registering at the following website: [email or web address]. By joining [employer]’s FollowMyHealth portal, you will also be able to view your health information using other applications (apps) compatible with our system.

## 2022-10-22 NOTE — OCCUPATIONAL THERAPY INITIAL EVALUATION ADULT - ADDITIONAL COMMENTS
PLOF obtained from pt. Pt reported that he owns a quad cane, fixed tub bench, grab bar.  (+) local driving

## 2022-10-22 NOTE — OCCUPATIONAL THERAPY INITIAL EVALUATION ADULT - LIVES WITH, PROFILE
with wife in a PH +4 steps to enter with bilateral handrails then level floor inside +walk-in-shower +fixed tub bench +grab bar +standard toilet with vanity within reach/spouse

## 2022-10-22 NOTE — DISCHARGE NOTE NURSING/CASE MANAGEMENT/SOCIAL WORK - NSDCPEFALRISK_GEN_ALL_CORE
For information on Fall & Injury Prevention, visit: [email or web address] OR  [email or web address] OR  https://www.cdc.gov/steadi/patient.html

## 2022-10-28 DIAGNOSIS — E86.0 DEHYDRATION: ICD-10-CM

## 2022-10-28 DIAGNOSIS — E03.9 HYPOTHYROIDISM, UNSPECIFIED: ICD-10-CM

## 2022-10-28 DIAGNOSIS — N40.0 BENIGN PROSTATIC HYPERPLASIA WITHOUT LOWER URINARY TRACT SYMPTOMS: ICD-10-CM

## 2022-10-28 DIAGNOSIS — K21.9 GASTRO-ESOPHAGEAL REFLUX DISEASE WITHOUT ESOPHAGITIS: ICD-10-CM

## 2022-10-28 DIAGNOSIS — Z91.14 PATIENT'S OTHER NONCOMPLIANCE WITH MEDICATION REGIMEN: ICD-10-CM

## 2022-10-28 DIAGNOSIS — R55 SYNCOPE AND COLLAPSE: ICD-10-CM

## 2022-10-28 DIAGNOSIS — I95.1 ORTHOSTATIC HYPOTENSION: ICD-10-CM

## 2022-10-28 DIAGNOSIS — U07.1 COVID-19: ICD-10-CM

## 2022-10-28 DIAGNOSIS — I10 ESSENTIAL (PRIMARY) HYPERTENSION: ICD-10-CM

## 2022-12-14 NOTE — ED ADULT TRIAGE NOTE - CHIEF COMPLAINT QUOTE
"I got up in the middle of the night to go to the bathroom and I felt a little nauseous. This morning I went to get up and my legs feel very weak." Pt states he tested COVID negative on Friday. No

## 2022-12-20 NOTE — ED PROVIDER NOTE - CPE EDP CARDIAC NORM
How Severe Are Your Spot(S)?: mild What Type Of Note Output Would You Prefer (Optional)?: Standard Output What Is The Reason For Today's Visit?: Full Body Skin Examination What Is The Reason For Today's Visit? (Being Monitored For X): concerning skin lesions on an annual basis Additional History: Full Body Skin Examination due to excessive sun exposure normal...

## 2023-10-13 NOTE — ED PROVIDER NOTE - MDM ORDERS SUBMITTED SELECTION
Medications/Labs/EKG/Imaging Studies Additional Notes: Site is clinically resolved and does not need further treatment. Render Risk Assessment In Note?: no Detail Level: Simple

## 2023-11-10 NOTE — ED PROVIDER NOTE - ATTENDING CONTRIBUTION TO CARE
----- Message from Ted Wick sent at 11/10/2023 12:22 PM CST -----  Regarding: CXR  Contact: [phone]  Feel free to call me. [phone].   pt co anterior chest soreness, sp fall onto grass 2 days ago. sts its been sore, hurts when he moves, and tries to sit up, laugh, cough.  sts this am the soreness was worse and it was hard for him to get out of bed 2/2 pain/stiffness. no diff breathing. denies hitting head. no neck pain. no ab pain. not on anti coag.      pt in nad, ncat, perrl, eomi, neck sup, ctab, rrr, ab soft, nt, nd. spine nt. tender over anterior chest.    will get labs, imaging, ekg.

## 2024-02-10 ENCOUNTER — INPATIENT (INPATIENT)
Facility: HOSPITAL | Age: 89
LOS: 1 days | Discharge: HOME HEALTH | DRG: 552 | End: 2024-02-12
Attending: INTERNAL MEDICINE | Admitting: INTERNAL MEDICINE
Payer: MEDICARE

## 2024-02-10 VITALS
RESPIRATION RATE: 18 BRPM | DIASTOLIC BLOOD PRESSURE: 51 MMHG | TEMPERATURE: 98 F | SYSTOLIC BLOOD PRESSURE: 118 MMHG | HEART RATE: 85 BPM | WEIGHT: 139.99 LBS | HEIGHT: 68 IN | OXYGEN SATURATION: 100 %

## 2024-02-10 DIAGNOSIS — Z90.49 ACQUIRED ABSENCE OF OTHER SPECIFIED PARTS OF DIGESTIVE TRACT: Chronic | ICD-10-CM

## 2024-02-10 DIAGNOSIS — R26.2 DIFFICULTY IN WALKING, NOT ELSEWHERE CLASSIFIED: ICD-10-CM

## 2024-02-10 LAB
ALBUMIN SERPL ELPH-MCNC: 3.9 G/DL — SIGNIFICANT CHANGE UP (ref 3.5–5.2)
ALP SERPL-CCNC: 96 U/L — SIGNIFICANT CHANGE UP (ref 30–115)
ALT FLD-CCNC: 18 U/L — SIGNIFICANT CHANGE UP (ref 0–41)
ANION GAP SERPL CALC-SCNC: 10 MMOL/L — SIGNIFICANT CHANGE UP (ref 7–14)
AST SERPL-CCNC: 20 U/L — SIGNIFICANT CHANGE UP (ref 0–41)
BASOPHILS # BLD AUTO: 0.03 K/UL — SIGNIFICANT CHANGE UP (ref 0–0.2)
BASOPHILS NFR BLD AUTO: 0.3 % — SIGNIFICANT CHANGE UP (ref 0–1)
BILIRUB SERPL-MCNC: 0.5 MG/DL — SIGNIFICANT CHANGE UP (ref 0.2–1.2)
BUN SERPL-MCNC: 32 MG/DL — HIGH (ref 10–20)
CALCIUM SERPL-MCNC: 9.1 MG/DL — SIGNIFICANT CHANGE UP (ref 8.4–10.5)
CHLORIDE SERPL-SCNC: 99 MMOL/L — SIGNIFICANT CHANGE UP (ref 98–110)
CO2 SERPL-SCNC: 26 MMOL/L — SIGNIFICANT CHANGE UP (ref 17–32)
CREAT SERPL-MCNC: 1.4 MG/DL — SIGNIFICANT CHANGE UP (ref 0.7–1.5)
EGFR: 48 ML/MIN/1.73M2 — LOW
EOSINOPHIL # BLD AUTO: 0.26 K/UL — SIGNIFICANT CHANGE UP (ref 0–0.7)
EOSINOPHIL NFR BLD AUTO: 2.9 % — SIGNIFICANT CHANGE UP (ref 0–8)
GLUCOSE SERPL-MCNC: 84 MG/DL — SIGNIFICANT CHANGE UP (ref 70–99)
HCT VFR BLD CALC: 35.9 % — LOW (ref 42–52)
HGB BLD-MCNC: 12.4 G/DL — LOW (ref 14–18)
IMM GRANULOCYTES NFR BLD AUTO: 0.2 % — SIGNIFICANT CHANGE UP (ref 0.1–0.3)
LYMPHOCYTES # BLD AUTO: 1.08 K/UL — LOW (ref 1.2–3.4)
LYMPHOCYTES # BLD AUTO: 11.9 % — LOW (ref 20.5–51.1)
MCHC RBC-ENTMCNC: 30 PG — SIGNIFICANT CHANGE UP (ref 27–31)
MCHC RBC-ENTMCNC: 34.5 G/DL — SIGNIFICANT CHANGE UP (ref 32–37)
MCV RBC AUTO: 86.7 FL — SIGNIFICANT CHANGE UP (ref 80–94)
MONOCYTES # BLD AUTO: 0.96 K/UL — HIGH (ref 0.1–0.6)
MONOCYTES NFR BLD AUTO: 10.6 % — HIGH (ref 1.7–9.3)
NEUTROPHILS # BLD AUTO: 6.71 K/UL — HIGH (ref 1.4–6.5)
NEUTROPHILS NFR BLD AUTO: 74.1 % — SIGNIFICANT CHANGE UP (ref 42.2–75.2)
NRBC # BLD: 0 /100 WBCS — SIGNIFICANT CHANGE UP (ref 0–0)
PLATELET # BLD AUTO: 183 K/UL — SIGNIFICANT CHANGE UP (ref 130–400)
PMV BLD: 10.8 FL — HIGH (ref 7.4–10.4)
POTASSIUM SERPL-MCNC: 4.7 MMOL/L — SIGNIFICANT CHANGE UP (ref 3.5–5)
POTASSIUM SERPL-SCNC: 4.7 MMOL/L — SIGNIFICANT CHANGE UP (ref 3.5–5)
PROT SERPL-MCNC: 6.3 G/DL — SIGNIFICANT CHANGE UP (ref 6–8)
RBC # BLD: 4.14 M/UL — LOW (ref 4.7–6.1)
RBC # FLD: 13.2 % — SIGNIFICANT CHANGE UP (ref 11.5–14.5)
SODIUM SERPL-SCNC: 135 MMOL/L — SIGNIFICANT CHANGE UP (ref 135–146)
WBC # BLD: 9.06 K/UL — SIGNIFICANT CHANGE UP (ref 4.8–10.8)
WBC # FLD AUTO: 9.06 K/UL — SIGNIFICANT CHANGE UP (ref 4.8–10.8)

## 2024-02-10 PROCEDURE — 85027 COMPLETE CBC AUTOMATED: CPT

## 2024-02-10 PROCEDURE — 80048 BASIC METABOLIC PNL TOTAL CA: CPT

## 2024-02-10 PROCEDURE — 36415 COLL VENOUS BLD VENIPUNCTURE: CPT

## 2024-02-10 PROCEDURE — 72170 X-RAY EXAM OF PELVIS: CPT | Mod: 26

## 2024-02-10 PROCEDURE — 99285 EMERGENCY DEPT VISIT HI MDM: CPT

## 2024-02-10 PROCEDURE — 73700 CT LOWER EXTREMITY W/O DYE: CPT | Mod: 26,RT,MA

## 2024-02-10 PROCEDURE — 73552 X-RAY EXAM OF FEMUR 2/>: CPT | Mod: 26,RT

## 2024-02-10 PROCEDURE — 72192 CT PELVIS W/O DYE: CPT | Mod: 26,MA

## 2024-02-10 PROCEDURE — 97162 PT EVAL MOD COMPLEX 30 MIN: CPT | Mod: GP

## 2024-02-10 RX ORDER — ACETAMINOPHEN 500 MG
650 TABLET ORAL EVERY 6 HOURS
Refills: 0 | Status: DISCONTINUED | OUTPATIENT
Start: 2024-02-10 | End: 2024-02-12

## 2024-02-10 RX ORDER — ENOXAPARIN SODIUM 100 MG/ML
40 INJECTION SUBCUTANEOUS EVERY 24 HOURS
Refills: 0 | Status: DISCONTINUED | OUTPATIENT
Start: 2024-02-10 | End: 2024-02-12

## 2024-02-10 RX ORDER — CHLORHEXIDINE GLUCONATE 213 G/1000ML
1 SOLUTION TOPICAL
Refills: 0 | Status: DISCONTINUED | OUTPATIENT
Start: 2024-02-10 | End: 2024-02-12

## 2024-02-10 RX ORDER — ACETAMINOPHEN 500 MG
975 TABLET ORAL ONCE
Refills: 0 | Status: COMPLETED | OUTPATIENT
Start: 2024-02-10 | End: 2024-02-10

## 2024-02-10 RX ORDER — LANOLIN ALCOHOL/MO/W.PET/CERES
3 CREAM (GRAM) TOPICAL AT BEDTIME
Refills: 0 | Status: DISCONTINUED | OUTPATIENT
Start: 2024-02-10 | End: 2024-02-12

## 2024-02-10 RX ORDER — PANTOPRAZOLE SODIUM 20 MG/1
20 TABLET, DELAYED RELEASE ORAL
Refills: 0 | Status: DISCONTINUED | OUTPATIENT
Start: 2024-02-10 | End: 2024-02-11

## 2024-02-10 RX ADMIN — Medication 975 MILLIGRAM(S): at 15:57

## 2024-02-10 RX ADMIN — ENOXAPARIN SODIUM 40 MILLIGRAM(S): 100 INJECTION SUBCUTANEOUS at 21:56

## 2024-02-10 RX ADMIN — Medication 3 MILLIGRAM(S): at 23:36

## 2024-02-10 NOTE — PATIENT PROFILE ADULT - FALL HARM RISK - HARM RISK INTERVENTIONS

## 2024-02-10 NOTE — ED PROVIDER NOTE - CLINICAL SUMMARY MEDICAL DECISION MAKING FREE TEXT BOX
70 year-old male presents to the ED for right hip pain after physical therapy on Thursday.  Patient has difficulty ambulating ever since the incident.  Physical exam noted to have right hip pain and pain with range of motion.  Distal pulses present.  5 out of 5 strength with knee flexion and extension.  We obtained imaging.  Depend interpretation of the hip x-ray by me negative for fractures or dislocation.  Due to persistent pain we obtained CT imaging which was unremarkable and negative for traumatic injury.  On reevaluation patient had difficulty ambulating and getting up off of the bed using his cane.  Is a high fall risk.  Will admit for physical therapy and home care evaluation.  Wife at bedside.  Currently patient has great difficulty performing his daily ADLs such as walking, using the bathroom with his pain.

## 2024-02-10 NOTE — H&P ADULT - HISTORY OF PRESENT ILLNESS
89 y/o M PMHx  GERD, hypothyroidism, c/o atraumatic right hip pain upon ambulation x 2 days. Pt reports pain started after using medicine ball between legs while at physical therapy on Thursday. Pt describe pain as "sore" ( 3/10 on pain scale) and non-radiating, w/ pain only occurring when bearing weight on RLE    Pt denies h/o RLE fx, direct trauma of R hip, chest pain, SOB, n/v, fever, chills, use of AC, numbness/swelling of RLE

## 2024-02-10 NOTE — H&P ADULT - NSHPSOCIALHISTORY_GEN_ALL_CORE
pt  reports h/o former smoking ( quit in 1980s)  pt denies h/o alcohol use  pt denies h/o illicit drug use

## 2024-02-10 NOTE — H&P ADULT - NSHPPHYSICALEXAM_GEN_ALL_CORE
T(C): 36.6 (02-10-24 @ 14:18), Max: 36.6 (02-10-24 @ 14:18)  HR: 85 (02-10-24 @ 14:18) (85 - 85)  BP: 118/51 (02-10-24 @ 14:18) (118/51 - 118/51)  RR: 18 (02-10-24 @ 14:18) (18 - 18)  SpO2: 100% (02-10-24 @ 14:18) (100% - 100%)    PHYSICAL EXAM:  GENERAL: laying in bed, appearing comfortable and in NAD  HEENT: Moist mucous membranes  NECK: Supple  CHEST/LUNG: even respirations b/l; no accessory muscle use  ABDOMEN: Soft, Nontender, Nondistended  EXTREMITIES:   No calf TTP/swelling b/l  (+) localized TTP of R lower hip w/ no guarding: unable to assess ROM as per pt  SKIN: warm  Neuro: A&Ox4

## 2024-02-10 NOTE — H&P ADULT - ASSESSMENT
89 y/o M PMHx  GERD, hypothyroidism, c/o atraumatic right hip pain upon ambulation x 2 days. Pt reports pain started after using medicine ball between legs while at physical therapy on Thursday. Pt describe pain as "sore" ( 3/10 on pain scale) and non-radiating, w/ pain only occurring when bearing weight on RLE  Pt denies h/o RLE fx, direct trauma of R hip, chest pain, SOB, n/v, fever, chills, use of AC, numbness/swelling of RLE    Plan   Admit    # R hip pain when bearing weight and w/ ambulation w/ onset occurring after outpt physical therapy   CT demonstrated L5-S1 degenerative disc disease with near complete loss of disc space.   No bony abnormality, no acute fx on xray  - PT consult   - fall risk precautions  - pt  verbalizing his preference to f/u outpt w/ PCP regarding degenerative disc findings on imaging     # Hypothyroidism    [place] thyroid non-formulary ( d/w pt that [place] thyroid; pt who verbalized understanding, and verbalized he will ask wife to bring home supply)      # GERD  - c/w PPI          # Precautions: Fall  - VTE ppx: LVX  - GI ppx: protonix    pt d/w Dr. Israel

## 2024-02-10 NOTE — ED PROVIDER NOTE - PHYSICAL EXAMINATION
Physical Exam    Vital Signs: I have reviewed the initial vital signs.  Constitutional: appears stated age, no acute distress  Eyes: Conjunctiva pink, Sclera clear,   Cardiovascular: S1 and S2, regular rate, regular rhythm, well-perfused extremities, radial pulses equal and 2+, pedal pulses 2+ and equal  Respiratory: unlabored respiratory effort, clear to auscultation bilaterally no wheezing, rales and rhonchi  Gastrointestinal: soft, non-tender abdomen, no pulsatile mass, normal bowl sounds  Musculoskeletal: supple neck, no lower extremity edema, no midline tenderness mild right hip ttp  Integumentary: warm, dry, no rash  Neurologic: awake, alert, nvi

## 2024-02-10 NOTE — H&P ADULT - NSHPLABSRESULTS_GEN_ALL_CORE
.  LABS:                         12.4   9.06  )-----------( 183      ( 10 Feb 2024 16:00 )             35.9     02-10    135  |  99  |  32<H>  ----------------------------<  84  4.7   |  26  |  1.4    Ca    9.1      10 Feb 2024 16:00    TPro  6.3  /  Alb  3.9  /  TBili  0.5  /  DBili  x   /  AST  20  /  ALT  18  /  AlkPhos  96  02-10      LIVER FUNCTIONS - ( 10 Feb 2024 16:00 )  Alb: 3.9 g/dL / Pro: 6.3 g/dL / ALK PHOS: 96 U/L / ALT: 18 U/L / AST: 20 U/L / GGT: x             Urinalysis Basic - ( 10 Feb 2024 16:00 )    Color: x / Appearance: x / SG: x / pH: x  Gluc: 84 mg/dL / Ketone: x  / Bili: x / Urobili: x   Blood: x / Protein: x / Nitrite: x   Leuk Esterase: x / RBC: x / WBC x   Sq Epi: x / Non Sq Epi: x / Bacteria: x      CT of R hip & pelvis:  L5-S1 degenerative disc disease with near complete loss of disc space.   Sacroiliac joints are intact. No acute sacral fracture. Lower lumbar   facet arthrosis. Iliac bone, ischial bone and pubic bones are intact. No   hip dislocation. No femoral neck or intertrochanteric fractures.   Tailbone/coccyx appears intact. Mild bilateral hip degenerative changes.    Xray of R hip and pelvis     FINDINGS:  Pelvis: No acute fracture or dislocation. Degenerative changes.    Right femur: No acute fracture. Vascular calcifications.    IMPRESSION:    No acute bony abnormality in the pelvis or right femur.    --- End of Report --    LENI PHILLIPS MD; Attending Radiologist  This document has been electronically signed. Feb 10 2024  4:29PM

## 2024-02-10 NOTE — ED PROVIDER NOTE - OBJECTIVE STATEMENT
ASSESSMENT/PLAN:    HEMATURIA-   Get PSA done in the lab  REFER PATIENT TO    UROLOGIST     FOR FURTHER EVALUATION AND TREATMENT;    PLEASE CALL THE SPECIALIST FOR AN APPOINTMENT, I HAVE  PROVIDED YOU WITH A REFERRAL    FOLLOW UP WITH YOUR PCP OR GO TO ER  IMMEDIATELY IF   NEEDED AS DISCUSSED.       Bactrim single strength  Increase fluids.   May use tylenol as directed as needed for fever or pain.     FOLLOW UP WITH PRIMARY DOC IN TWO DAYS  GO TO ED FOR CHEST PAIN SHORTNESS OF BREATH OR DIZZINESS.    Diagnosis and prognosis, treatment and side-effects were explained and all questions were answered satisfactorily and there were no further questions upon discharge.       9-year-old male, past medical history GERD, hypothyroidism, presents emergency department for right hip pain.  Patient was working with physical therapy was doing a medicine ball squeeze between legs and her right hip.  No fall or trauma.This occurred 2 days ago since patient has not been unable to ambulate

## 2024-02-10 NOTE — ED PROVIDER NOTE - ED STEMI HIDDEN
hide Spiral Flap Text: The defect edges were debeveled with a #15 scalpel blade.  Given the location of the defect, shape of the defect and the proximity to free margins a spiral flap was deemed most appropriate.  Using a sterile surgical marker, an appropriate rotation flap was drawn incorporating the defect and placing the expected incisions within the relaxed skin tension lines where possible. The area thus outlined was incised deep to adipose tissue with a #15 scalpel blade.  The skin margins were undermined to an appropriate distance in all directions utilizing iris scissors.

## 2024-02-11 LAB
ANION GAP SERPL CALC-SCNC: 12 MMOL/L — SIGNIFICANT CHANGE UP (ref 7–14)
BUN SERPL-MCNC: 27 MG/DL — HIGH (ref 10–20)
CALCIUM SERPL-MCNC: 9.7 MG/DL — SIGNIFICANT CHANGE UP (ref 8.4–10.5)
CHLORIDE SERPL-SCNC: 105 MMOL/L — SIGNIFICANT CHANGE UP (ref 98–110)
CO2 SERPL-SCNC: 25 MMOL/L — SIGNIFICANT CHANGE UP (ref 17–32)
CREAT SERPL-MCNC: 1.2 MG/DL — SIGNIFICANT CHANGE UP (ref 0.7–1.5)
EGFR: 57 ML/MIN/1.73M2 — LOW
GLUCOSE SERPL-MCNC: 103 MG/DL — HIGH (ref 70–99)
HCT VFR BLD CALC: 37.9 % — LOW (ref 42–52)
HGB BLD-MCNC: 13.2 G/DL — LOW (ref 14–18)
MCHC RBC-ENTMCNC: 30.1 PG — SIGNIFICANT CHANGE UP (ref 27–31)
MCHC RBC-ENTMCNC: 34.8 G/DL — SIGNIFICANT CHANGE UP (ref 32–37)
MCV RBC AUTO: 86.5 FL — SIGNIFICANT CHANGE UP (ref 80–94)
NRBC # BLD: 0 /100 WBCS — SIGNIFICANT CHANGE UP (ref 0–0)
PLATELET # BLD AUTO: 169 K/UL — SIGNIFICANT CHANGE UP (ref 130–400)
PMV BLD: 11 FL — HIGH (ref 7.4–10.4)
POTASSIUM SERPL-MCNC: 4.4 MMOL/L — SIGNIFICANT CHANGE UP (ref 3.5–5)
POTASSIUM SERPL-SCNC: 4.4 MMOL/L — SIGNIFICANT CHANGE UP (ref 3.5–5)
RBC # BLD: 4.38 M/UL — LOW (ref 4.7–6.1)
RBC # FLD: 13.2 % — SIGNIFICANT CHANGE UP (ref 11.5–14.5)
SODIUM SERPL-SCNC: 142 MMOL/L — SIGNIFICANT CHANGE UP (ref 135–146)
WBC # BLD: 7.02 K/UL — SIGNIFICANT CHANGE UP (ref 4.8–10.8)
WBC # FLD AUTO: 7.02 K/UL — SIGNIFICANT CHANGE UP (ref 4.8–10.8)

## 2024-02-11 RX ORDER — PANTOPRAZOLE SODIUM 20 MG/1
20 TABLET, DELAYED RELEASE ORAL DAILY
Refills: 0 | Status: DISCONTINUED | OUTPATIENT
Start: 2024-02-11 | End: 2024-02-12

## 2024-02-11 RX ADMIN — ENOXAPARIN SODIUM 40 MILLIGRAM(S): 100 INJECTION SUBCUTANEOUS at 21:17

## 2024-02-11 RX ADMIN — CHLORHEXIDINE GLUCONATE 1 APPLICATION(S): 213 SOLUTION TOPICAL at 05:12

## 2024-02-12 ENCOUNTER — TRANSCRIPTION ENCOUNTER (OUTPATIENT)
Age: 89
End: 2024-02-12

## 2024-02-12 VITALS
SYSTOLIC BLOOD PRESSURE: 108 MMHG | HEART RATE: 75 BPM | DIASTOLIC BLOOD PRESSURE: 62 MMHG | TEMPERATURE: 97 F | RESPIRATION RATE: 18 BRPM

## 2024-02-12 RX ADMIN — Medication 1 MILLIGRAM(S): at 05:19

## 2024-02-12 NOTE — DISCHARGE NOTE PROVIDER - NSDCMRMEDTOKEN_GEN_ALL_CORE_FT
Alessandro Thyroid 180 mg oral tablet: 1 tab(s) orally once a day  Protonix 20 mg oral delayed release tablet: 1 tab(s) orally once a day

## 2024-02-12 NOTE — PHYSICAL THERAPY INITIAL EVALUATION ADULT - PERTINENT HX OF CURRENT PROBLEM, REHAB EVAL
Pt is 89 y/o M PMHx  GERD, hypothyroidism, c/o atraumatic right hip pain upon ambulation x 2 days. Pt reports pain started after using medicine ball between legs while at physical therapy on Thursday. Pt describe pain as "sore" ( 3/10 on pain scale) and non-radiating, w/ pain only occurring when bearing weight on RLE.Pt denies h/o RLE fx, direct trauma of R hip, chest pain, SOB, n/v, fever, chills, use of AC, numbness/swelling of RLE R hip pain when bearing weight and w/ ambulation.  CT demonstrated L5-S1 degenerative disc disease with near complete loss of disc space.  patient awaiting orthopedic consult.  recommended PT eval and treat for functional mobility. Pt is 89 y/o M PMHx  GERD, hypothyroidism, c/o atraumatic right hip pain upon ambulation x 2 days. Pt reports pain started after using medicine ball between legs while at physical therapy on Thursday. Pt describe pain as "sore" ( 3/10 on pain scale) and non-radiating, w/ pain only occurring when bearing weight on RLE.Pt denies h/o RLE fx, direct trauma of R hip, chest pain, SOB, n/v, fever, chills, use of AC, numbness/swelling of RLE R hip pain when bearing weight and w/ ambulation.  CT demonstrated L5-S1 degenerative disc disease with near complete loss of disc space.  patient awaiting orthopedic consult.  recommended PT eval and treat for functional mobility.

## 2024-02-12 NOTE — PROGRESS NOTE ADULT - TIME BILLING
patient seen and examined and care plan reviewed
patient seen and examined case reviewed with house staff and nursing, patients wife at bedside

## 2024-02-12 NOTE — PROGRESS NOTE ADULT - GENITOURINARY MALE
no cva tenderness or suprapubic pain
normal external genitalia/no hernia/no discharge/no mass/no tenderness/no ulcer/normal/no penile lesion/no palpable testicular mass/no scrotal mass

## 2024-02-12 NOTE — PROGRESS NOTE ADULT - SUBJECTIVE AND OBJECTIVE BOX
89y male hx GERD with esophageal stricture, lumbar disc disease who was in physical therapy outpatient and developed rt hip pain following exercising with a ball for 2 days. Patient having difficulty ambulating, admitted for fall risk an rehab evaluation                          13.2   7.02  )-----------( 169      ( 11 Feb 2024 08:54 )             37.9   02-10    135  |  99  |  32<H>  ----------------------------<  84  4.7   |  26  |  1.4    Ca    9.1      10 Feb 2024 16:00    TPro  6.3  /  Alb  3.9  /  TBili  0.5  /  DBili  x   /  AST  20  /  ALT  18  /  AlkPhos  96  02-10    Xray hip no fracture  lumbar spine degenerative arthritis and lumbar disc disease    Urinalysis Basic - ( 10 Feb 2024 16:00 )    Color: x / Appearance: x / SG: x / pH: x  Gluc: 84 mg/dL / Ketone: x  / Bili: x / Urobili: x   Blood: x / Protein: x / Nitrite: x   Leuk Esterase: x / RBC: x / WBC x   Sq Epi: x / Non Sq Epi: x / Bacteria: x    
89y male hx GERD with esophageal stricture, lumbar disc disease who was in physical therapy outpatient and developed rt hip pain following exercising with a ball for 2 days. Patient having difficulty ambulating, admitted for fall risk an rehab evaluation patient awaiting orthopedic consult and pt eval                          13.2   7.02  )-----------( 169      ( 11 Feb 2024 08:54 )             37.9   02-10    135  |  99  |  32<H>  ----------------------------<  84  4.7   |  26  |  1.4    Ca    9.1      10 Feb 2024 16:00    TPro  6.3  /  Alb  3.9  /  TBili  0.5  /  DBili  x   /  AST  20  /  ALT  18  /  AlkPhos  96  02-10    Xray hip no fracture  lumbar spine degenerative arthritis and lumbar disc disease    Urinalysis Basic - ( 10 Feb 2024 16:00 )    Color: x / Appearance: x / SG: x / pH: x  Gluc: 84 mg/dL / Ketone: x  / Bili: x / Urobili: x   Blood: x / Protein: x / Nitrite: x   Leuk Esterase: x / RBC: x / WBC x   Sq Epi: x / Non Sq Epi: x / Bacteria: x

## 2024-02-12 NOTE — PROGRESS NOTE ADULT - CONSTITUTIONAL
rt hip pain/normal/well-groomed/no distress/distress due to pain
rt hip pain/normal/well-groomed/no distress/distress due to pain

## 2024-02-12 NOTE — DISCHARGE NOTE PROVIDER - NSDCCPCAREPLAN_GEN_ALL_CORE_FT
PRINCIPAL DISCHARGE DIAGNOSIS  Diagnosis: Inability to ambulate due to hip  Assessment and Plan of Treatment:       SECONDARY DISCHARGE DIAGNOSES  Diagnosis: Hypothyroid  Assessment and Plan of Treatment: continue [place] thyroid treatment upon discharge     PRINCIPAL DISCHARGE DIAGNOSIS  Diagnosis: Inability to ambulate due to hip  Assessment and Plan of Treatment: Home PT to be set up prior to discharge, patient will be given a walker, can follow up with Dr Israel next qweek, and outpatient Orthopedics if pain persists.      SECONDARY DISCHARGE DIAGNOSES  Diagnosis: Hypothyroid  Assessment and Plan of Treatment: continue [place] thyroid treatment upon discharge

## 2024-02-12 NOTE — PHYSICAL THERAPY INITIAL EVALUATION ADULT - GENERAL OBSERVATIONS, REHAB EVAL
10:00-10:25 am, Chart reviewed, pt available for PT, RN notified.  Pt c/o R hip pain,OMEGA Odom notified, and is willing to participate with PT.  Pt encountered in recliner +[facility] with NAD .Wife was present during assessment.

## 2024-02-12 NOTE — CHART NOTE - NSCHARTNOTEFT_GEN_A_CORE
13.2   7.02  )-----------( 169      ( 11 Feb 2024 08:54 )             37.9     02-11    142  |  105  |  27<H>  ----------------------------<  103<H>  4.4   |  25  |  1.2    Ca    9.7      11 Feb 2024 08:54    TPro  6.3  /  Alb  3.9  /  TBili  0.5  /  DBili  x   /  AST  20  /  ALT  18  /  AlkPhos  96  02-10      LIVER FUNCTIONS - ( 10 Feb 2024 16:00 )  Alb: 3.9 g/dL / Pro: 6.3 g/dL / ALK PHOS: 96 U/L / ALT: 18 U/L / AST: 20 U/L / GGT: x           Urinalysis Basic - ( 11 Feb 2024 08:54 )    Color: x / Appearance: x / SG: x / pH: x  Gluc: 103 mg/dL / Ketone: x  / Bili: x / Urobili: x   Blood: x / Protein: x / Nitrite: x   Leuk Esterase: x / RBC: x / WBC x   Sq Epi: x / Non Sq Epi: x / Bacteria: x      Case discussed with Dr velasquez earlier  - daily labs unremarkable  - Ortho consult pending  - Physical Therapy eval pending  - Dispo: home with services
Discontinue orthopedics consult, patient will follow up with Orthopedics as outpatient if pain persists. this was discussed with wife, the patient and Dr Israel
patient combative, and very agitated yelling and swinging cane at nurse. code grey called.   constant observation and ativan x1 ordered
Patient requires a rolling walker for d/c to home due to mobility deficient that can be resolved with the use of a walker. Patient is able to safely use walker.  Patient requires a bedside commode for d/c home due to patient is at risk to fall and they have no access to a bathroom on the floor they will be residing.    Diagnosis: Lumbar Disc Disease

## 2024-02-12 NOTE — DISCHARGE NOTE PROVIDER - HOSPITAL COURSE
· Assessment	  89 y/o M PMHx  GERD, hypothyroidism, c/o atraumatic right hip pain upon ambulation x 2 days. Pt reports pain started after using medicine ball between legs while at physical therapy on Thursday. Pt describe pain as "sore" ( 3/10 on pain scale) and non-radiating, w/ pain only occurring when bearing weight on RLE  Pt denies h/o RLE fx, direct trauma of R hip, chest pain, SOB, n/v, fever, chills, use of AC, numbness/swelling of RLE    Plan   Admit    # R hip pain when bearing weight and w/ ambulation w/ onset occurring after outpt physical therapy   CT demonstrated L5-S1 degenerative disc disease with near complete loss of disc space.   No bony abnormality, no acute fx on xray  - PT consulted:........  - Ortho consulted:.......  - fall risk precautions  - pt  verbalizing his preference to f/u outpt w/ PCP regarding degenerative disc findings on imaging     # Hypothyroidism    [place] thyroid treatment      # GERD  - c/w PPI          # Precautions: Fall  - VTE ppx: LVX  - GI ppx: protonix     · Assessment	  89 y/o M PMHx  GERD, hypothyroidism, c/o atraumatic right hip pain upon ambulation x 2 days. Pt reports pain started after using medicine ball between legs while at physical therapy on Thursday. Pt describe pain as "sore" ( 3/10 on pain scale) and non-radiating, w/ pain only occurring when bearing weight on RLE  Pt denies h/o RLE fx, direct trauma of R hip, chest pain, SOB, n/v, fever, chills, use of AC, numbness/swelling of RLE    Plan   Admit    # R hip pain when bearing weight and w/ ambulation w/ onset occurring after outpt physical therapy   CT demonstrated L5-S1 degenerative disc disease with near complete loss of disc space.   No bony abnormality, no acute fx on xray  - PT consulted: home PT recommended: to be set up, patient given a walker  - Can follow with Orthopedics as outpatient if pain persists  - fall risk precautions  - pt  verbalizing his preference to f/u outpt w/ PCP regarding degenerative disc findings on imaging     # Hypothyroidism    [place] thyroid treatment      # GERD  - c/w PPI          # Precautions: Fall  - VTE ppx: LVX  - GI ppx: protonix

## 2024-02-12 NOTE — PROGRESS NOTE ADULT - ASSESSMENT
89 yo male hx hypo thyroidism gerd with esophageal stricture, osteoarthritis, lumbar disc disease ambulated with a cane developed rt hip pain after physical therapy no hip fracture, lumbar disc disease    plan physical therapy eval   orthopedic eval  pt has reflux and esophagitis unable to tolerate nsaids  staples to soft diet with ground meats  
89 yo male hx hypo thyroidism gerd with esophageal stricture, osteoarthritis, lumbar disc disease ambulated with a cane developed rt hip pain after physical therapy    plan physical therapy eval   orthopedic eval  pt has reflux and esophagitis unable to tolerate nsaids

## 2024-02-12 NOTE — DISCHARGE NOTE PROVIDER - PROVIDER TOKENS
PROVIDER:[TOKEN:[[id number]],FOLLOWUP:[Routine]] PROVIDER:[TOKEN:[[id number]],FOLLOWUP:[1 week]]

## 2024-02-12 NOTE — PHYSICAL THERAPY INITIAL EVALUATION ADULT - ADDITIONAL COMMENTS
As per pt he lives with wife in a PH. There are 3 Steps to enter with b/l handrails. He was independent with functional mobility with straight cane and needed assistance with ADLs.

## 2024-02-12 NOTE — DISCHARGE NOTE NURSING/CASE MANAGEMENT/SOCIAL WORK - PATIENT PORTAL LINK FT
You can access the FollowMyHealth Patient Portal offered by [facility] by registering at the following website: [email or web address]. By joining [employer]’s FollowMyHealth portal, you will also be able to view your health information using other applications (apps) compatible with our system.

## 2024-02-12 NOTE — DISCHARGE NOTE PROVIDER - CARE PROVIDER_API CALL
Arielle Israel  Internal Medicine  [address] [place] [id number]  Phone: [phone]  Fax: [phone]  Follow Up Time: Routine   Arielle Israel  Internal Medicine  [address] [place] [id number]  Phone: [phone]  Fax: [phone]  Follow Up Time: 1 week

## 2024-02-12 NOTE — PROGRESS NOTE ADULT - GASTROINTESTINAL
complaining of reflux due to stricture/normal/soft/nontender/nondistended/normal active bowel sounds details…

## 2024-02-14 NOTE — ED ADULT TRIAGE NOTE - HEART RATE (BEATS/MIN)
On Cefpodoxime and Flagyl until planned end date of 02/15/24 per ID recs. Given recent hospitalization and re-admission will start broad spectrum Abx.     - f/u Bcx, de-escalate pending cultures   104

## 2024-02-16 DIAGNOSIS — R26.9 UNSPECIFIED ABNORMALITIES OF GAIT AND MOBILITY: ICD-10-CM

## 2024-02-16 DIAGNOSIS — Z90.49 ACQUIRED ABSENCE OF OTHER SPECIFIED PARTS OF DIGESTIVE TRACT: ICD-10-CM

## 2024-02-16 DIAGNOSIS — E03.9 HYPOTHYROIDISM, UNSPECIFIED: ICD-10-CM

## 2024-02-16 DIAGNOSIS — Z87.891 PERSONAL HISTORY OF NICOTINE DEPENDENCE: ICD-10-CM

## 2024-02-16 DIAGNOSIS — M51.37 OTHER INTERVERTEBRAL DISC DEGENERATION, LUMBOSACRAL REGION: ICD-10-CM

## 2024-02-16 DIAGNOSIS — M19.90 UNSPECIFIED OSTEOARTHRITIS, UNSPECIFIED SITE: ICD-10-CM

## 2024-02-16 DIAGNOSIS — K21.00 GASTRO-ESOPHAGEAL REFLUX DISEASE WITH ESOPHAGITIS, WITHOUT BLEEDING: ICD-10-CM

## 2024-02-16 DIAGNOSIS — R45.1 RESTLESSNESS AND AGITATION: ICD-10-CM

## 2024-02-16 DIAGNOSIS — Z91.81 HISTORY OF FALLING: ICD-10-CM

## 2024-03-24 ENCOUNTER — INPATIENT (INPATIENT)
Facility: HOSPITAL | Age: 89
LOS: 1 days | Discharge: HOME HEALTH | DRG: 884 | End: 2024-03-26
Attending: INTERNAL MEDICINE | Admitting: INTERNAL MEDICINE
Payer: MEDICARE

## 2024-03-24 VITALS
HEART RATE: 105 BPM | WEIGHT: 145.06 LBS | HEIGHT: 68 IN | DIASTOLIC BLOOD PRESSURE: 48 MMHG | SYSTOLIC BLOOD PRESSURE: 113 MMHG | RESPIRATION RATE: 18 BRPM | OXYGEN SATURATION: 96 % | TEMPERATURE: 97 F

## 2024-03-24 DIAGNOSIS — Z90.49 ACQUIRED ABSENCE OF OTHER SPECIFIED PARTS OF DIGESTIVE TRACT: Chronic | ICD-10-CM

## 2024-03-24 DIAGNOSIS — R53.1 WEAKNESS: ICD-10-CM

## 2024-03-24 LAB
ALBUMIN SERPL ELPH-MCNC: 4.1 G/DL — SIGNIFICANT CHANGE UP (ref 3.5–5.2)
ALP SERPL-CCNC: 106 U/L — SIGNIFICANT CHANGE UP (ref 30–115)
ALT FLD-CCNC: 30 U/L — SIGNIFICANT CHANGE UP (ref 0–41)
ANION GAP SERPL CALC-SCNC: 12 MMOL/L — SIGNIFICANT CHANGE UP (ref 7–14)
APPEARANCE UR: CLEAR — SIGNIFICANT CHANGE UP
AST SERPL-CCNC: 41 U/L — SIGNIFICANT CHANGE UP (ref 0–41)
BASOPHILS # BLD AUTO: 0.03 K/UL — SIGNIFICANT CHANGE UP (ref 0–0.2)
BASOPHILS # BLD AUTO: 0.03 K/UL — SIGNIFICANT CHANGE UP (ref 0–0.2)
BASOPHILS NFR BLD AUTO: 0.2 % — SIGNIFICANT CHANGE UP (ref 0–1)
BASOPHILS NFR BLD AUTO: 0.2 % — SIGNIFICANT CHANGE UP (ref 0–1)
BILIRUB SERPL-MCNC: 0.9 MG/DL — SIGNIFICANT CHANGE UP (ref 0.2–1.2)
BILIRUB UR-MCNC: NEGATIVE — SIGNIFICANT CHANGE UP
BUN SERPL-MCNC: 35 MG/DL — HIGH (ref 10–20)
CALCIUM SERPL-MCNC: 9.6 MG/DL — SIGNIFICANT CHANGE UP (ref 8.4–10.5)
CHLORIDE SERPL-SCNC: 101 MMOL/L — SIGNIFICANT CHANGE UP (ref 98–110)
CO2 SERPL-SCNC: 25 MMOL/L — SIGNIFICANT CHANGE UP (ref 17–32)
COLOR SPEC: YELLOW — SIGNIFICANT CHANGE UP
CREAT SERPL-MCNC: 1.4 MG/DL — SIGNIFICANT CHANGE UP (ref 0.7–1.5)
DIFF PNL FLD: NEGATIVE — SIGNIFICANT CHANGE UP
EGFR: 48 ML/MIN/1.73M2 — LOW
EOSINOPHIL # BLD AUTO: 0.07 K/UL — SIGNIFICANT CHANGE UP (ref 0–0.7)
EOSINOPHIL # BLD AUTO: 0.13 K/UL — SIGNIFICANT CHANGE UP (ref 0–0.7)
EOSINOPHIL NFR BLD AUTO: 0.4 % — SIGNIFICANT CHANGE UP (ref 0–8)
EOSINOPHIL NFR BLD AUTO: 0.8 % — SIGNIFICANT CHANGE UP (ref 0–8)
GLUCOSE SERPL-MCNC: 137 MG/DL — HIGH (ref 70–99)
GLUCOSE UR QL: NEGATIVE MG/DL — SIGNIFICANT CHANGE UP
HCT VFR BLD CALC: 32.1 % — LOW (ref 42–52)
HCT VFR BLD CALC: 37.2 % — LOW (ref 42–52)
HGB BLD-MCNC: 10.8 G/DL — LOW (ref 14–18)
HGB BLD-MCNC: 12.6 G/DL — LOW (ref 14–18)
IMM GRANULOCYTES NFR BLD AUTO: 0.4 % — HIGH (ref 0.1–0.3)
IMM GRANULOCYTES NFR BLD AUTO: 0.4 % — HIGH (ref 0.1–0.3)
KETONES UR-MCNC: ABNORMAL MG/DL
LEUKOCYTE ESTERASE UR-ACNC: NEGATIVE — SIGNIFICANT CHANGE UP
LYMPHOCYTES # BLD AUTO: 0.46 K/UL — LOW (ref 1.2–3.4)
LYMPHOCYTES # BLD AUTO: 0.9 K/UL — LOW (ref 1.2–3.4)
LYMPHOCYTES # BLD AUTO: 2.7 % — LOW (ref 20.5–51.1)
LYMPHOCYTES # BLD AUTO: 5.7 % — LOW (ref 20.5–51.1)
MCHC RBC-ENTMCNC: 30.3 PG — SIGNIFICANT CHANGE UP (ref 27–31)
MCHC RBC-ENTMCNC: 30.3 PG — SIGNIFICANT CHANGE UP (ref 27–31)
MCHC RBC-ENTMCNC: 33.6 G/DL — SIGNIFICANT CHANGE UP (ref 32–37)
MCHC RBC-ENTMCNC: 33.9 G/DL — SIGNIFICANT CHANGE UP (ref 32–37)
MCV RBC AUTO: 89.4 FL — SIGNIFICANT CHANGE UP (ref 80–94)
MCV RBC AUTO: 89.9 FL — SIGNIFICANT CHANGE UP (ref 80–94)
MONOCYTES # BLD AUTO: 0.95 K/UL — HIGH (ref 0.1–0.6)
MONOCYTES # BLD AUTO: 1.09 K/UL — HIGH (ref 0.1–0.6)
MONOCYTES NFR BLD AUTO: 5.5 % — SIGNIFICANT CHANGE UP (ref 1.7–9.3)
MONOCYTES NFR BLD AUTO: 6.9 % — SIGNIFICANT CHANGE UP (ref 1.7–9.3)
NEUTROPHILS # BLD AUTO: 13.57 K/UL — HIGH (ref 1.4–6.5)
NEUTROPHILS # BLD AUTO: 15.74 K/UL — HIGH (ref 1.4–6.5)
NEUTROPHILS NFR BLD AUTO: 86 % — HIGH (ref 42.2–75.2)
NEUTROPHILS NFR BLD AUTO: 90.8 % — HIGH (ref 42.2–75.2)
NITRITE UR-MCNC: NEGATIVE — SIGNIFICANT CHANGE UP
NRBC # BLD: 0 /100 WBCS — SIGNIFICANT CHANGE UP (ref 0–0)
NRBC # BLD: 0 /100 WBCS — SIGNIFICANT CHANGE UP (ref 0–0)
NT-PROBNP SERPL-SCNC: 866 PG/ML — HIGH (ref 0–300)
PH UR: 5.5 — SIGNIFICANT CHANGE UP (ref 5–8)
PLATELET # BLD AUTO: 168 K/UL — SIGNIFICANT CHANGE UP (ref 130–400)
PLATELET # BLD AUTO: 192 K/UL — SIGNIFICANT CHANGE UP (ref 130–400)
PMV BLD: 11.2 FL — HIGH (ref 7.4–10.4)
PMV BLD: 11.4 FL — HIGH (ref 7.4–10.4)
POTASSIUM SERPL-MCNC: 4.3 MMOL/L — SIGNIFICANT CHANGE UP (ref 3.5–5)
POTASSIUM SERPL-SCNC: 4.3 MMOL/L — SIGNIFICANT CHANGE UP (ref 3.5–5)
PROT SERPL-MCNC: 6.4 G/DL — SIGNIFICANT CHANGE UP (ref 6–8)
PROT UR-MCNC: SIGNIFICANT CHANGE UP MG/DL
RBC # BLD: 3.57 M/UL — LOW (ref 4.7–6.1)
RBC # BLD: 4.16 M/UL — LOW (ref 4.7–6.1)
RBC # FLD: 13.6 % — SIGNIFICANT CHANGE UP (ref 11.5–14.5)
RBC # FLD: 13.9 % — SIGNIFICANT CHANGE UP (ref 11.5–14.5)
SODIUM SERPL-SCNC: 138 MMOL/L — SIGNIFICANT CHANGE UP (ref 135–146)
SP GR SPEC: 1.02 — SIGNIFICANT CHANGE UP (ref 1–1.03)
TROPONIN SAMPLING TIME: SIGNIFICANT CHANGE UP
TROPONIN T, HIGH SENSITIVITY RESULT: 34 NG/L — HIGH (ref 6–21)
TROPONIN T, HIGH SENSITIVITY RESULT: 52 NG/L — CRITICAL HIGH (ref 6–21)
TROPONIN T, HIGH SENSITIVITY RESULT: 59 NG/L — CRITICAL HIGH (ref 6–21)
UROBILINOGEN FLD QL: 0.2 MG/DL — SIGNIFICANT CHANGE UP (ref 0.2–1)
WBC # BLD: 15.79 K/UL — HIGH (ref 4.8–10.8)
WBC # BLD: 17.32 K/UL — HIGH (ref 4.8–10.8)
WBC # FLD AUTO: 15.79 K/UL — HIGH (ref 4.8–10.8)
WBC # FLD AUTO: 17.32 K/UL — HIGH (ref 4.8–10.8)

## 2024-03-24 PROCEDURE — 71045 X-RAY EXAM CHEST 1 VIEW: CPT | Mod: 26

## 2024-03-24 PROCEDURE — 93306 TTE W/DOPPLER COMPLETE: CPT

## 2024-03-24 PROCEDURE — 36415 COLL VENOUS BLD VENIPUNCTURE: CPT

## 2024-03-24 PROCEDURE — 99285 EMERGENCY DEPT VISIT HI MDM: CPT

## 2024-03-24 PROCEDURE — 83735 ASSAY OF MAGNESIUM: CPT

## 2024-03-24 PROCEDURE — 84100 ASSAY OF PHOSPHORUS: CPT

## 2024-03-24 PROCEDURE — 80048 BASIC METABOLIC PNL TOTAL CA: CPT

## 2024-03-24 PROCEDURE — 85025 COMPLETE CBC W/AUTO DIFF WBC: CPT

## 2024-03-24 PROCEDURE — 84484 ASSAY OF TROPONIN QUANT: CPT

## 2024-03-24 PROCEDURE — 97162 PT EVAL MOD COMPLEX 30 MIN: CPT | Mod: GP

## 2024-03-24 RX ORDER — PANTOPRAZOLE SODIUM 20 MG/1
1 TABLET, DELAYED RELEASE ORAL
Qty: 0 | Refills: 0 | DISCHARGE

## 2024-03-24 RX ORDER — THYROID 120 MG
1 TABLET ORAL
Qty: 0 | Refills: 0 | DISCHARGE

## 2024-03-24 RX ORDER — LANOLIN ALCOHOL/MO/W.PET/CERES
3 CREAM (GRAM) TOPICAL AT BEDTIME
Refills: 0 | Status: DISCONTINUED | OUTPATIENT
Start: 2024-03-24 | End: 2024-03-26

## 2024-03-24 RX ORDER — ACETAMINOPHEN 500 MG
650 TABLET ORAL EVERY 6 HOURS
Refills: 0 | Status: DISCONTINUED | OUTPATIENT
Start: 2024-03-24 | End: 2024-03-26

## 2024-03-24 RX ORDER — ONDANSETRON 8 MG/1
4 TABLET, FILM COATED ORAL EVERY 8 HOURS
Refills: 0 | Status: DISCONTINUED | OUTPATIENT
Start: 2024-03-24 | End: 2024-03-26

## 2024-03-24 RX ORDER — SODIUM CHLORIDE 9 MG/ML
500 INJECTION INTRAMUSCULAR; INTRAVENOUS; SUBCUTANEOUS ONCE
Refills: 0 | Status: COMPLETED | OUTPATIENT
Start: 2024-03-24 | End: 2024-03-24

## 2024-03-24 RX ORDER — THYROID 120 MG
1 TABLET ORAL
Refills: 0 | DISCHARGE

## 2024-03-24 RX ORDER — PANTOPRAZOLE SODIUM 20 MG/1
40 TABLET, DELAYED RELEASE ORAL
Refills: 0 | Status: DISCONTINUED | OUTPATIENT
Start: 2024-03-24 | End: 2024-03-26

## 2024-03-24 RX ORDER — THYROID 120 MG
90 TABLET ORAL DAILY
Refills: 0 | Status: DISCONTINUED | OUTPATIENT
Start: 2024-03-24 | End: 2024-03-26

## 2024-03-24 RX ORDER — ENOXAPARIN SODIUM 100 MG/ML
40 INJECTION SUBCUTANEOUS EVERY 24 HOURS
Refills: 0 | Status: DISCONTINUED | OUTPATIENT
Start: 2024-03-24 | End: 2024-03-26

## 2024-03-24 RX ADMIN — SODIUM CHLORIDE 500 MILLILITER(S): 9 INJECTION INTRAMUSCULAR; INTRAVENOUS; SUBCUTANEOUS at 08:02

## 2024-03-24 RX ADMIN — SODIUM CHLORIDE 500 MILLILITER(S): 9 INJECTION INTRAMUSCULAR; INTRAVENOUS; SUBCUTANEOUS at 06:41

## 2024-03-24 NOTE — PATIENT PROFILE ADULT - FUNCTIONAL ASSESSMENT - DAILY ACTIVITY SCORE.
social admit  discharged from hospital and brought home by senior care  unable to get inside home social admit  discharged from hospital and brought home by senior care  unable to get inside home  pt has lvad 18

## 2024-03-24 NOTE — H&P ADULT - NSHPPHYSICALEXAM_GEN_ALL_CORE
Vital Signs Last 24 Hrs  T(C): 35.8 (24 Mar 2024 07:32), Max: 36.3 (24 Mar 2024 05:44)  T(F): 96.5 (24 Mar 2024 07:32), Max: 97.4 (24 Mar 2024 05:44)  HR: 89 (24 Mar 2024 07:32) (89 - 105)  BP: 109/53 (24 Mar 2024 07:32) (109/53 - 113/48)  BP(mean): 76 (24 Mar 2024 07:32) (76 - 76)  RR: 18 (24 Mar 2024 07:32) (18 - 18)  SpO2: 97% (24 Mar 2024 07:32) (96% - 97%)    PHYSICAL EXAM:      Constitutional: A&Ox4  Respiratory: cta b/l  Cardiovascular: s1 s2 rrr  Gastrointestinal: soft nt  nd + bs no rebound or guarding  Genitourinary: no cva tenderness  Extremities: normal rom, no edema, calf tenderness  Neurological:no focal deficits  Skin: no rash

## 2024-03-24 NOTE — PATIENT PROFILE ADULT - FUNCTIONAL ASSESSMENT - BASIC MOBILITY 2.
Self quarantine until COVID test results are received.  If positive and symptomatic, patient should self quarantine for 10 days from onset of symptoms with complete resolution of symptoms for 24 hours before returning to work or going out into public.  According to CDC guidelines December 27, 2021, if asymptomatic, patient may be released from quarantine after 5 days but must continue to mask for an additional 5 days thereafter.  If the test is negative and it is more than 5 days from symptom onset, patient may return to work if symptoms are completely resolved for >24h.  If symptoms worsen, however, especially if you present with lower respiratory symptoms, fevers, shortness of breath, or significant worsening symptoms in general, it is recommended to return to the ED.    Your symptoms are likely attributed to a viral illness. With viruses, the most important treatment is getting plenty of rest and drinking plenty of fluids as well as symptomatic care:   - Tylenol/ibuprofen for fever and body aches. Adults: tylenol 1000mg every 8 hours as needed.  - Congestion: OTC Guaifenesin (Mucinex D) or Sudafed  - Sore throat/cough: lozenges, warm salt water gargles, 1 TBS honey with warm water or tea, OTC dextromethorphan for cough (Delsym, Robitussin), ibuprofen for sore throat    - Return to clinic with worsening symptoms or if symptoms fail to improve.    3 = A little assistance

## 2024-03-24 NOTE — H&P ADULT - ASSESSMENT
Pt is a 89yo M witha pmhx of hypothyroidism, GERD, gait disorder, became increasingly weak today, was unable to get off the toilet because he felt generally weak symptoms described as constant and moderate, worse with activity. Pt reports not drinking enough water which he feels contributed to the episode. Pt denies associated fevers, chills, cp, sob, cough, abd pain, n/v, hematuria, dysuria. Pt feels better now after IVF in ED.     #generalized weakness likely secondary to dehydration  #CKD stage 3  -creatinine as at baseline 1.4  -given 1 liter NS in ED  -monitor I&O  -ortho bp  -repeat bmp in am  -PT consult  -d/w ED staff, will d/w Snow    #elevated troponin  #elevated BNP with no signs/sx of CHF  #heart murmur  -telemetry  -TTE  -repeat troponin  -cardiology was consulted by ED    #leukocytosis suspect reactive, no obvious infection, ua negative, cxr negative  -repeat wbc today and trend    #hypothyroidism  -c/w [place] thyroid    #GERD  -c/w pantoprazole    #DVT prophylaxis  -SCD and lovenox

## 2024-03-24 NOTE — ED ADULT TRIAGE NOTE - SOURCE OF INFORMATION
Spouse/EMS OB Provider reported that the vagina was inspected and no foreign body was found/Laps, needles and instrument count was correct

## 2024-03-24 NOTE — H&P ADULT - HISTORY OF PRESENT ILLNESS
Pt is a 89yo M witha pmhx of hypothyroidism, GERD, gait disorder, became increasingly weak today, was unable to get off the toilet because he felt generally weak symptoms described as constant and moderate, worse with activity. Pt reports not drinking enough water which he feels contributed to the episode. Pt deneis associated fevers, chills, cp, sob, cough, abd pain, n/v, hematuria, dysuria. Pt feels better now after IVF in ED.

## 2024-03-24 NOTE — ED ADULT NURSE NOTE - NS ED PATIENT SAFETY CONCERN
Kae Bhatia MD  Cardiology Progress Note      Patient Name: Srikanth Hernandez  MRN: [id number]      Subjective:     Subjective: Patient denies any chest pain or shortness of breath.  Continues to have left hip pain.  Had confusion earlier this morning.    Current Meds:  • WARFARIN - PHARMACIST MONITORED   Does not apply See Admin Instructions   • QUEtiapine  25 mg Oral BID   • albuterol  2.5 mg Nebulization Q6H Resp   • AMIODarone  200 mg Oral Daily   • amLODIPine  10 mg Oral Daily   • aspirin  81 mg Oral Daily   • budesonide-formoterol  2 puff Inhalation BID Resp   • buPROPion XL  150 mg Oral Daily   • DULoxetine  60 mg Oral Daily   • ferrous sulfate  325 mg Oral Daily   • furosemide  20 mg Oral Daily   • pantoprazole  40 mg Oral QAM AC   • lidocaine  1 patch Transdermal Daily   • lisinopril  20 mg Oral Daily   • metformin  1,000 mg Oral BID WC   • metoPROLOL succinate  25 mg Oral BID   • montelukast  10 mg Oral Q Evening   • potassium CHLORIDE  10 mEq Oral Daily   • roflumilast  500 mcg Oral Daily   • [Held by provider] atorvastatin  80 mg Oral Nightly   • sitaGLIPtin  50 mg Oral Daily   • spironolactone  25 mg Oral Daily   • cholecalciferol  50 mcg Oral Daily   • sodium chloride (PF)  2 mL Intracatheter 2 times per day        Prn Meds:  sodium chloride, sodium chloride, HYDROcodone-acetaminophen, acetaminophen, albuterol, HYDROcodone-acetaminophen, sodium chloride, morphine injection, ondansetron       Objective:     Vital signs:  Vital Last Value 24 Hour Range   Temperature 98 °F (36.7 °C) (03/02/21 1815) Temp  Min: 97.5 °F (36.4 °C)  Max: 98.1 °F (36.7 °C)   Pulse 68 (03/02/21 1815) Pulse  Min: 59  Max: 68   Respiratory 20 (03/02/21 1815) Resp  Min: 18  Max: 20   Non-Invasive  Blood Pressure 122/68 (03/02/21 1815) BP  Min: 113/64  Max: 151/83   Pulse Oximetry 98 % (03/02/21 1453) SpO2  Min: 96 %  Max: 99 %   Arterial   Blood Pressure   No data recorded      Intake/Output :    Intake/Output Summary (Last  24 hours) at 3/2/2021 1948  Last data filed at 3/2/2021 1900  Gross per 24 hour   Intake 3505 ml   Output 1201 ml   Net 2304 ml        Exam:    General appearance: No acute distress, conversant   Eyes: anicteric sclerae, moist conjunctivae; no lid-lag; PERRLA  HENT: Atraumatic; oropharynx clear with moist mucous membranes  Neck: Trachea midline; No jugular venous distention, no carotid bruit. No thyromegaly  Lungs: CTA, with normal respiratory effort. No rales or wheezing.   CV: Regular rate and rhythm with normal S1-S2.  Holosystolic murmur at the left lower sternal border.  Abdomen: Soft, non-tender; no masses or bruit.  Extremities: Bilateral chronic venous stasis dermatitis.  No edema.  peripheral pulses 1+.    Skin: Normal temperature, turgor and texture; no rash or ulcers.  Psych: Appropriate affect, alert and oriented to person, place and time.     Labs:  Recent Labs   Lab 03/02/21  1926 03/02/21  1224 03/02/21  1219 03/02/21  0934 03/01/21  1720 03/01/21  0700  02/28/21  0453  02/26/21  2200   WBC  --   --   --  7.9 9.8 14.7*  --  13.6*   < > 10.6   HCT 26.2*  --  22.5* 20.4*  --  24.2*   < > 25.0*   < > 25.6*   HGB 8.1*  --  6.9* 6.2*  --  7.5*   < > 7.7*   < > 7.9*   PLT  --   --   --  196 209 227  --  252   < > 273   SODIUM  --   --   --  140  --  138  --  135   < > 138   POTASSIUM  --   --   --  5.3*  --  5.0  --  4.8   < > 4.2   CHLORIDE  --   --   --  104  --  102  --  100   < > 102   GLUCOSE  --   --   --  97  --  117*  --  101*   < > 105*   CO2  --   --   --  28  --  27  --  28   < > 29   BUN  --   --   --  40*  --  35*  --  23*   < > 11   CREATININE  --   --   --  1.06  --  1.34*  --  1.21*   < > 0.92   CALCIUM  --   --   --  7.0*  --  8.5  --  8.5   < > 8.8   TOTPROTEIN  --   --   --  6.1*  --  6.5  --  6.5   < > 6.7   ALBUMIN  --   --   --  2.3*  --  2.6*  --  2.7*   < > 2.7*   BILIRUBIN  --   --   --  0.5  --  0.5  --  0.4   < > 0.2   AST  --   --   --  401*  --  1,052*  --  167*   < > 25   GPT   --   --   --  200*  --  290*  --  78*   < > 24   ALKPT  --   --   --  313*  --  193*  --  154*   < > 145*   NTPROB  --  5,675*  --   --   --   --   --   --   --  7,148*    < > = values in this interval not displayed.     Echo: EF 45% with basal inferior hypokinesis.  Tricuspid valve endocarditis with moderate regurgitation.  Severe pulmonary hypertension with RV pressure overload.        Assessment and Plan:      1.  Mechanical fall with left intertrochanteric fracture status post nailing with postoperative anemia- transfuse to keep hemoglobin around 8 given severe pulmonary hypertension and cardiomyopathy   2.  CAD with mild ischemic cardiomyopathy-no angina.    Resume low-dose aspirin.  3.  Paroxysmal atrial fibrillation-in sinus rhythm.  Continue amiodarone.  Continue warfarin.    4.  Ischemic cardiomyopathy- continue metoprolol lisinopril and spironolactone.    5.  Tricuspid valve endocarditis-vegetation is smaller compared to January 2020.  Cultures were negative prior to discharge on last admission.  Patient has completed 6 weeks of antibiotics.  Moderate tricuspid regurgitation.  6.  Transaminitis-?  Secondary to Intra-Op hypotension/hepatic congestion.  Hold statins for now.  7.  Chronic diastolic CHF-IV Lasix with blood transfusion today.    Kae Bhatia MD  Interventional Cardiology   [facility] [place]  [email or web address]  P: [phone]   F: [phone]    3/2/2021 7:48 PM   No

## 2024-03-24 NOTE — H&P ADULT - NSHPLABSRESULTS_GEN_ALL_CORE
12.6   17.32 )-----------( 192      ( 24 Mar 2024 06:46 )             37.2           138  |  101  |  35<H>  ----------------------------<  137<H>  4.3   |  25  |  1.4    Ca    9.6      24 Mar 2024 06:46    TPro  6.4  /  Alb  4.1  /  TBili  0.9  /  DBili  x   /  AST  41  /  ALT  30  /  AlkPhos  106            Urinalysis Basic - ( 24 Mar 2024 10:00 )    Color: Yellow / Appearance: Clear / S.022 / pH: x  Gluc: x / Ketone: Trace mg/dL  / Bili: Negative / Urobili: 0.2 mg/dL   Blood: x / Protein: Trace mg/dL / Nitrite: Negative   Leuk Esterase: Negative / RBC: x / WBC x   Sq Epi: x / Non Sq Epi: x / Bacteria: x      < from: Xray Chest 1 View- PORTABLE-Urgent (24 @ 07:54) >    Impression:    Low lung volume.    Extensive bilateral calcific pleural plaques.

## 2024-03-24 NOTE — ED ADULT TRIAGE NOTE - CHIEF COMPLAINT QUOTE
Patient  BIBA from home for weakness.  Per wife "He couldn't get up off the toilet.  I helped him and lowered him to the ground".

## 2024-03-24 NOTE — ED PROVIDER NOTE - PHYSICAL EXAMINATION
CONSTITUTIONAL: Elderly male; in no apparent distress.   EYES: PERRL; EOM intact.   CARDIOVASCULAR: Normal S1, S2; diffuse 3/6 murmur to entire chest.    RESPIRATORY: Normal chest excursion with respiration; breath sounds clear and equal bilaterally; no wheezes, rhonchi, or rales.  GI/: Normal bowel sounds; non-distended; non-tender; no palpable organomegaly.   MS: No calf swelling and tenderness.  No pitting edema.  Of all extremities.  SKIN: Normal for age and race; warm; dry; good turgor; no apparent lesions or exudate.   NEURO/PSYCH: A & O x 3; grossly unremarkable.

## 2024-03-24 NOTE — H&P ADULT - NSHPREVIEWOFSYSTEMS_GEN_ALL_CORE
General:	no fever, weight loss,  chills  Skin: no rash, ulcers  Ophthalmologic: no visual changes  ENMT:	no sore throat  Respiratory and Thorax: no cough, wheeze,  sob  Cardiovascular:	no chest pain, palpitations, dizziness  Gastrointestinal:	no nausea, vomiting, diarrhea, abd pain  Genitourinary:	no dysuria, hematuria  Musculoskeletal:	no joint pains  Neurological:	 no speech disturbance, focal weakness, numbness  Psychiatric:	no depression, anxiety, psychosis  Hematology/Lymphatics:	no anemia  Endocrine:	no polyuria, polydipsia General:	no fever, weight loss,  chills  Skin: no rash, ulcers  Ophthalmologic: no visual changes  ENMT:	no sore throat  Respiratory and Thorax: no cough, wheeze,  sob  Cardiovascular:	no chest pain, palpitations, dizziness  Gastrointestinal:	no nausea, vomiting, diarrhea, abd pain  Genitourinary:	no dysuria, hematuria +chronic urinary frequency  Musculoskeletal:	no joint pains  Neurological:	 no speech disturbance, focal weakness, numbness  Psychiatric:	no depression, anxiety, psychosis  Hematology/Lymphatics:	no anemia  Endocrine:	no polyuria, polydipsia

## 2024-03-24 NOTE — PATIENT PROFILE ADULT - TOBACCO USE
Problem: Adult Inpatient Plan of Care  Goal: Plan of Care Review  Outcome: Ongoing, Progressing  Flowsheets  Taken 10/1/2021 0439 by Claudia Paredes, RN  Plan of Care Reviewed With: patient  Outcome Summary: Pt up at [place] & voiding qs. Pt breastfeeding infant well.  Taken 9/30/2021 0319 by Philly Thomson RN  Progress: improving   Goal Outcome Evaluation:  Plan of Care Reviewed With: patient           Outcome Summary: Pt up at [facility] & voiding qs. Pt breastfeeding infant well.  
Goal Outcome Evaluation:  Plan of Care Reviewed With: patient        Progress: improving  Outcome Summary: plan d/c home  
Never smoker

## 2024-03-24 NOTE — ED ADULT NURSE NOTE - NSFALLHARMRISKINTERV_ED_ALL_ED

## 2024-03-24 NOTE — ED PROVIDER NOTE - PROGRESS NOTE DETAILS
Signed out to Dr. Carrillo. Patient feeling better, will continue to monitor. Dr Israel accepts admission.  Per cardio NP Sean, can admit to telemetry.  Med TIMMY brock

## 2024-03-24 NOTE — ED PROVIDER NOTE - CLINICAL SUMMARY MEDICAL DECISION MAKING FREE TEXT BOX
89-year-old male, history of hypothyroidism, GERD, arthritis, brought in by EMS for generalized weakness and was unable to get up off the toilet bowl this morning. Well appearing on exam. Labs and EKG were ordered and reviewed.  Troponin elevated with significant delta> 5. Imaging was ordered and reviewed by me.  Appropriate medications for patient's presenting complaints were ordered and effects were reassessed.  Additional history was obtained from wife.  Escalation to admission/observation was considered.  Cardiology consulted.  Patient requires inpatient hospitalization.

## 2024-03-24 NOTE — ED PROVIDER NOTE - OBJECTIVE STATEMENT
89 years old male history of hypothyroidism, GERD, arthritis to neck and back BIBA from home secondary to weakness.  As per patient, he was sitting on the toilet to urinate this evening.  Not able to get off the toilet bowl so wife called EMS.  Has a chronic neck and back pain from the arthritis and being follow-up with orthopedic as outpatient.  Denies numbness to lower extremities.  Otherwise denies fever, chills, coughing, chest pain, abdominal pain, vomiting or diarrhea.

## 2024-03-25 ENCOUNTER — RESULT REVIEW (OUTPATIENT)
Age: 89
End: 2024-03-25

## 2024-03-25 LAB
ANION GAP SERPL CALC-SCNC: 10 MMOL/L — SIGNIFICANT CHANGE UP (ref 7–14)
BASOPHILS # BLD AUTO: 0.02 K/UL — SIGNIFICANT CHANGE UP (ref 0–0.2)
BASOPHILS NFR BLD AUTO: 0.2 % — SIGNIFICANT CHANGE UP (ref 0–1)
BUN SERPL-MCNC: 35 MG/DL — HIGH (ref 10–20)
CALCIUM SERPL-MCNC: 9.1 MG/DL — SIGNIFICANT CHANGE UP (ref 8.4–10.5)
CHLORIDE SERPL-SCNC: 106 MMOL/L — SIGNIFICANT CHANGE UP (ref 98–110)
CO2 SERPL-SCNC: 24 MMOL/L — SIGNIFICANT CHANGE UP (ref 17–32)
CREAT SERPL-MCNC: 1.4 MG/DL — SIGNIFICANT CHANGE UP (ref 0.7–1.5)
EGFR: 48 ML/MIN/1.73M2 — LOW
EOSINOPHIL # BLD AUTO: 0.27 K/UL — SIGNIFICANT CHANGE UP (ref 0–0.7)
EOSINOPHIL NFR BLD AUTO: 2.9 % — SIGNIFICANT CHANGE UP (ref 0–8)
GLUCOSE SERPL-MCNC: 83 MG/DL — SIGNIFICANT CHANGE UP (ref 70–99)
HCT VFR BLD CALC: 31.2 % — LOW (ref 42–52)
HGB BLD-MCNC: 10.9 G/DL — LOW (ref 14–18)
IMM GRANULOCYTES NFR BLD AUTO: 0.3 % — SIGNIFICANT CHANGE UP (ref 0.1–0.3)
LYMPHOCYTES # BLD AUTO: 1.33 K/UL — SIGNIFICANT CHANGE UP (ref 1.2–3.4)
LYMPHOCYTES # BLD AUTO: 14.1 % — LOW (ref 20.5–51.1)
MAGNESIUM SERPL-MCNC: 1.8 MG/DL — SIGNIFICANT CHANGE UP (ref 1.8–2.4)
MCHC RBC-ENTMCNC: 30.5 PG — SIGNIFICANT CHANGE UP (ref 27–31)
MCHC RBC-ENTMCNC: 34.9 G/DL — SIGNIFICANT CHANGE UP (ref 32–37)
MCV RBC AUTO: 87.4 FL — SIGNIFICANT CHANGE UP (ref 80–94)
MONOCYTES # BLD AUTO: 0.95 K/UL — HIGH (ref 0.1–0.6)
MONOCYTES NFR BLD AUTO: 10.1 % — HIGH (ref 1.7–9.3)
NEUTROPHILS # BLD AUTO: 6.81 K/UL — HIGH (ref 1.4–6.5)
NEUTROPHILS NFR BLD AUTO: 72.4 % — SIGNIFICANT CHANGE UP (ref 42.2–75.2)
NRBC # BLD: 0 /100 WBCS — SIGNIFICANT CHANGE UP (ref 0–0)
PHOSPHATE SERPL-MCNC: 3.1 MG/DL — SIGNIFICANT CHANGE UP (ref 2.1–4.9)
PLATELET # BLD AUTO: 157 K/UL — SIGNIFICANT CHANGE UP (ref 130–400)
PMV BLD: 11.2 FL — HIGH (ref 7.4–10.4)
POTASSIUM SERPL-MCNC: 4 MMOL/L — SIGNIFICANT CHANGE UP (ref 3.5–5)
POTASSIUM SERPL-SCNC: 4 MMOL/L — SIGNIFICANT CHANGE UP (ref 3.5–5)
RBC # BLD: 3.57 M/UL — LOW (ref 4.7–6.1)
RBC # FLD: 14 % — SIGNIFICANT CHANGE UP (ref 11.5–14.5)
SODIUM SERPL-SCNC: 140 MMOL/L — SIGNIFICANT CHANGE UP (ref 135–146)
WBC # BLD: 9.41 K/UL — SIGNIFICANT CHANGE UP (ref 4.8–10.8)
WBC # FLD AUTO: 9.41 K/UL — SIGNIFICANT CHANGE UP (ref 4.8–10.8)

## 2024-03-25 PROCEDURE — 93306 TTE W/DOPPLER COMPLETE: CPT | Mod: 26

## 2024-03-25 PROCEDURE — 99222 1ST HOSP IP/OBS MODERATE 55: CPT

## 2024-03-25 RX ADMIN — Medication 30 MILLILITER(S): at 21:11

## 2024-03-25 RX ADMIN — Medication 30 MILLILITER(S): at 16:25

## 2024-03-25 RX ADMIN — Medication 90 MILLIGRAM(S): at 05:27

## 2024-03-25 NOTE — PHYSICAL THERAPY INITIAL EVALUATION ADULT - GENERAL OBSERVATIONS, REHAB EVAL
08:40-09:05 Chart reviewed. Pt encountered sitting in chair, may be seen by Physical Therapist as confirmed with Nurse. Patient denied pain at rest and ready to walk now; +tele/heplock UE

## 2024-03-25 NOTE — PHYSICAL THERAPY INITIAL EVALUATION ADULT - ADDITIONAL COMMENTS
Per patient, they live in a private home with 4 steps outside with  2 rails to enter home then no further steps inside home; was using cane but also has a rolling walker which he uses mostly at night

## 2024-03-25 NOTE — PHYSICAL THERAPY INITIAL EVALUATION ADULT - NUMBER OF STAIRS, REHAB EVAL
Patient had Blood work done on 02/02 and would like a call back regarding his results. Please advise.   4

## 2024-03-25 NOTE — PHYSICAL THERAPY INITIAL EVALUATION ADULT - PERTINENT HX OF CURRENT PROBLEM, REHAB EVAL
89 y/o male admitted with diagnosis of Weakness, presented to ED after becoming increasingly weak at home that patient was not able to get off toilet  as he felt generally weak described as constant and moderate, worse with activity, likely secondary to dehydration

## 2024-03-26 ENCOUNTER — TRANSCRIPTION ENCOUNTER (OUTPATIENT)
Age: 89
End: 2024-03-26

## 2024-03-26 VITALS
RESPIRATION RATE: 18 BRPM | HEART RATE: 74 BPM | OXYGEN SATURATION: 97 % | TEMPERATURE: 98 F | SYSTOLIC BLOOD PRESSURE: 141 MMHG | DIASTOLIC BLOOD PRESSURE: 65 MMHG

## 2024-03-26 LAB
ANION GAP SERPL CALC-SCNC: 7 MMOL/L — SIGNIFICANT CHANGE UP (ref 7–14)
BASOPHILS # BLD AUTO: 0.02 K/UL — SIGNIFICANT CHANGE UP (ref 0–0.2)
BASOPHILS NFR BLD AUTO: 0.2 % — SIGNIFICANT CHANGE UP (ref 0–1)
BUN SERPL-MCNC: 32 MG/DL — HIGH (ref 10–20)
CALCIUM SERPL-MCNC: 9.5 MG/DL — SIGNIFICANT CHANGE UP (ref 8.4–10.5)
CHLORIDE SERPL-SCNC: 105 MMOL/L — SIGNIFICANT CHANGE UP (ref 98–110)
CO2 SERPL-SCNC: 28 MMOL/L — SIGNIFICANT CHANGE UP (ref 17–32)
CREAT SERPL-MCNC: 1.4 MG/DL — SIGNIFICANT CHANGE UP (ref 0.7–1.5)
EGFR: 48 ML/MIN/1.73M2 — LOW
EOSINOPHIL # BLD AUTO: 0.34 K/UL — SIGNIFICANT CHANGE UP (ref 0–0.7)
EOSINOPHIL NFR BLD AUTO: 3.6 % — SIGNIFICANT CHANGE UP (ref 0–8)
GLUCOSE SERPL-MCNC: 95 MG/DL — SIGNIFICANT CHANGE UP (ref 70–99)
HCT VFR BLD CALC: 33.4 % — LOW (ref 42–52)
HGB BLD-MCNC: 11.5 G/DL — LOW (ref 14–18)
IMM GRANULOCYTES NFR BLD AUTO: 0.5 % — HIGH (ref 0.1–0.3)
LYMPHOCYTES # BLD AUTO: 1.1 K/UL — LOW (ref 1.2–3.4)
LYMPHOCYTES # BLD AUTO: 11.6 % — LOW (ref 20.5–51.1)
MAGNESIUM SERPL-MCNC: 2 MG/DL — SIGNIFICANT CHANGE UP (ref 1.8–2.4)
MCHC RBC-ENTMCNC: 30.1 PG — SIGNIFICANT CHANGE UP (ref 27–31)
MCHC RBC-ENTMCNC: 34.4 G/DL — SIGNIFICANT CHANGE UP (ref 32–37)
MCV RBC AUTO: 87.4 FL — SIGNIFICANT CHANGE UP (ref 80–94)
MONOCYTES # BLD AUTO: 1.01 K/UL — HIGH (ref 0.1–0.6)
MONOCYTES NFR BLD AUTO: 10.7 % — HIGH (ref 1.7–9.3)
NEUTROPHILS # BLD AUTO: 6.96 K/UL — HIGH (ref 1.4–6.5)
NEUTROPHILS NFR BLD AUTO: 73.4 % — SIGNIFICANT CHANGE UP (ref 42.2–75.2)
NRBC # BLD: 0 /100 WBCS — SIGNIFICANT CHANGE UP (ref 0–0)
PHOSPHATE SERPL-MCNC: 2.8 MG/DL — SIGNIFICANT CHANGE UP (ref 2.1–4.9)
PLATELET # BLD AUTO: 169 K/UL — SIGNIFICANT CHANGE UP (ref 130–400)
PMV BLD: 11.2 FL — HIGH (ref 7.4–10.4)
POTASSIUM SERPL-MCNC: 4.6 MMOL/L — SIGNIFICANT CHANGE UP (ref 3.5–5)
POTASSIUM SERPL-SCNC: 4.6 MMOL/L — SIGNIFICANT CHANGE UP (ref 3.5–5)
RBC # BLD: 3.82 M/UL — LOW (ref 4.7–6.1)
RBC # FLD: 13.8 % — SIGNIFICANT CHANGE UP (ref 11.5–14.5)
SODIUM SERPL-SCNC: 140 MMOL/L — SIGNIFICANT CHANGE UP (ref 135–146)
WBC # BLD: 9.48 K/UL — SIGNIFICANT CHANGE UP (ref 4.8–10.8)
WBC # FLD AUTO: 9.48 K/UL — SIGNIFICANT CHANGE UP (ref 4.8–10.8)

## 2024-03-26 PROCEDURE — 99223 1ST HOSP IP/OBS HIGH 75: CPT

## 2024-03-26 RX ADMIN — Medication 90 MILLIGRAM(S): at 06:06

## 2024-03-26 RX ADMIN — PANTOPRAZOLE SODIUM 40 MILLIGRAM(S): 20 TABLET, DELAYED RELEASE ORAL at 06:03

## 2024-03-26 NOTE — CONSULT NOTE ADULT - SUBJECTIVE AND OBJECTIVE BOX
HPI:  89 y o M witha pmhx of hypothyroidism, GERD, gait disorder, became increasingly weak today, was unable to get off the toilet because he felt generally weak symptoms described as constant and moderate, worse with activity. Pt reports not drinking enough water which he feels contributed to the episode. Pt deneis associated fevers, chills, cp, sob, cough, abd pain, n/v, hematuria, dysuria. Pt feels better now after IVF in ED.   ptn seen and  exam  at  bed  side nad  in  good  health  no new  c.o  ptn  labs  ,imaging ,medical and  rehab  notes are  appreciated  and  reviewed  .    PTN  REFERRED TO ACUTE  REHAB  FOR  EVAL AND  TX   PAST MEDICAL & SURGICAL HISTORY:  Hypothyroidism      Acid reflux      S/P cholecystectomy          Hospital Course:    TODAY'S SUBJECTIVE & REVIEW OF SYMPTOMS:     Constitutional WNL   Cardio WNL   Resp WNL   GI WNL  Heme WNL  Endo WNL  Skin WNL  MSK WNL  Neuro WNL  Cognitive WNL  Psych WNL      MEDICATIONS  (STANDING):  enoxaparin Injectable 40 milliGRAM(s) SubCutaneous every 24 hours  pantoprazole    Tablet 40 milliGRAM(s) Oral before breakfast  thyroid 90 milliGRAM(s) Oral daily    MEDICATIONS  (PRN):  acetaminophen     Tablet .. 650 milliGRAM(s) Oral every 6 hours PRN Temp greater or equal to 38C (100.4F), Mild Pain (1 - 3)  aluminum hydroxide/magnesium hydroxide/simethicone Suspension 30 milliLiter(s) Oral every 4 hours PRN Dyspepsia  melatonin 3 milliGRAM(s) Oral at bedtime PRN Insomnia  ondansetron Injectable 4 milliGRAM(s) IV Push every 8 hours PRN Nausea and/or Vomiting      FAMILY HISTORY:      Allergies    No Known Allergies    Intolerances        SOCIAL HISTORY:    [  ] Etoh  [  ] Smoking  [  ] Substance abuse     Home Environment:  [  ] Home Alone  [x  ] Lives with Family  [  ] Home Health Aid    Dwelling:  [  ] Apartment  [ x ] Private House  [  ] Adult Home  [  ] Skilled Nursing Facility      [  ] Short Term  [  ] Long Term  [ x ] Stairs       Elevator [  ]    FUNCTIONAL STATUS PTA: (Check all that apply)  Ambulation: [  x ]Independent    [  ] Dependent     [  ] Non-Ambulatory  Assistive Device: [ x ] SA Cane  [  ]  Q Cane  [ x ] Walker  [  ]  Wheelchair  ADL : [  x] Independent  [  ]  Dependent       Vital Signs Last 24 Hrs  T(C): 36.8 (26 Mar 2024 05:03), Max: 36.8 (26 Mar 2024 05:03)  T(F): 98.3 (26 Mar 2024 05:03), Max: 98.3 (26 Mar 2024 05:03)  HR: 74 (26 Mar 2024 05:03) (68 - 76)  BP: 141/65 (26 Mar 2024 05:03) (128/60 - 141/65)  BP(mean): --  RR: 18 (26 Mar 2024 05:03) (18 - 24)  SpO2: 97% (26 Mar 2024 05:03) (95% - 98%)    Parameters below as of 26 Mar 2024 05:03  Patient On (Oxygen Delivery Method): room air          PHYSICAL EXAM: Alert & Oriented X3  GENERAL: NAD, well-groomed, well-developed  HEAD:  Atraumatic, Normocephalic  EYES: EOMI, PERRLA, conjunctiva and sclera clear  NECK: Supple, No JVD, Normal thyroid  CHEST/LUNG: Clear to percussion bilaterally; No rales, rhonchi, wheezing, or rubs  HEART: Regular rate and rhythm; No murmurs, rubs, or gallops  ABDOMEN: Soft, Nontender, Nondistended; Bowel sounds present  EXTREMITIES:  2+ Peripheral Pulses, No clubbing, cyanosis, or edema    NERVOUS SYSTEM:  Cranial Nerves 2-12 intact [ x ] Abnormal  [  ]  ROM: WFL all extremities [x  ]  Abnormal [  ]  Motor Strength: WFL all extremities  [x  ]  Abnormal [  ]  Sensation: intact to light touch [x  ] Abnormal [  ]  Reflexes: Symmetric [x ]  Abnormal [  ]    FUNCTIONAL STATUS:  Bed Mobility: Independent [  ]  Supervision [ x ]  Needs Assistance [  ]  N/A [  ]  Transfers: Independent [  ]  Supervision [x  ]  Needs Assistance [  ]  N/A [  ]   Ambulation: Independent [  ]  Supervision [ x ]  Needs Assistance [  ]  N/A [  ]  ADL: Independent [  ] Requires Assistance [  ] N/A [ x ]  SEE PT/OT IE NOTES    LABS:                        11.5   9.48  )-----------( 169      ( 26 Mar 2024 07:06 )             33.4     03-26    140  |  105  |  32<H>  ----------------------------<  95  4.6   |  28  |  1.4    Ca    9.5      26 Mar 2024 07:06  Phos  2.8     03-26  Mg     2.0     03-26        Urinalysis Basic - ( 26 Mar 2024 07:06 )    Color: x / Appearance: x / SG: x / pH: x  Gluc: 95 mg/dL / Ketone: x  / Bili: x / Urobili: x   Blood: x / Protein: x / Nitrite: x   Leuk Esterase: x / RBC: x / WBC x   Sq Epi: x / Non Sq Epi: x / Bacteria: x        RADIOLOGY & ADDITIONAL STUDIES:< from: CT Femur No Cont, Right (02.10.24 @ 15:31) >  No acute bony abnormality in the pelvis or right femur.    < end of copied text >      Assesment:
MAURIZIO CHASE     89y     Male    MRN-[id number]                                                           CC:Patient is a 89y old  Male who presents with a chief complaint of weakness (25 Mar 2024 09:18)      HPI:  Pt is a 89yo M witha pmhx of hypothyroidism, GERD, gait disorder, became increasingly weak today, was unable to get off the toilet because he felt generally weak symptoms described as constant and moderate, worse with activity. Pt reports not drinking enough water which he feels contributed to the episode. Pt deneis associated fevers, chills, cp, sob, cough, abd pain, n/v, hematuria, dysuria. Pt feels better now after IVF in ED.  (24 Mar 2024 10:42)    Patient reports he is being worked up for his spie issues and was referred to a spine doctor with his recent imaging to be evaluated for TAMIKA.  Has had sciatica episodes on and off for a few years    ROS:  Constitutional, Neurological, Psychiatric, Eyes, ENT, Cardiovascular, Respiratory, Gastrointestinal, Genitourinary, Musculoskeletal, Integumentary, Endocrine and Heme/Lymph are otherwise negative. Except for noted above    Social History: No smoking, No drinking, No drug use    FAMILY HISTORY: non contributory              Vital Signs Last 24 Hrs  T(C): 36.4 (25 Mar 2024 04:44), Max: 36.6 (24 Mar 2024 16:11)  T(F): 97.5 (25 Mar 2024 04:44), Max: 97.8 (24 Mar 2024 16:11)  HR: 70 (25 Mar 2024 04:44) (70 - 86)  BP: 114/57 (25 Mar 2024 04:44) (100/49 - 114/57)  BP(mean): --  RR: 18 (25 Mar 2024 04:44) (18 - 18)  SpO2: 95% (25 Mar 2024 04:44) (95% - 97%)    Parameters below as of 25 Mar 2024 04:44  Patient On (Oxygen Delivery Method): room air        Physical Exam:  Constitutional: alert and in no acute distress.  Eyes: the sclera and conjunctiva were normal, pupils were equal in size, round, reactive to light, with normal accommodation and extraocular movements were intact.   Back: no costovertebral angle tenderness and no spinal tenderness.      Neuro Exam:  Orientation: oriented to person, oriented to place and oriented to time.   Attention: normal concentrating ability and visual attention was not decreased.   Language: no difficulty naming common objects, no difficulty repeating a phrase, no difficulty writing a sentence, fluency intact, comprehension intact and reading intact.   Fund of knowledge: displays adequate knowledge of personal past history.   Cranial Nerves: visual acuity intact bilaterally, visual fields full to confrontation, pupils equal round and reactive to light, extraocular motion intact, facial sensation intact symmetrically, face symmetrical, hearing was intact bilaterally, tongue and palate midline, head turning and shoulder shrug symmetric and there was no tongue deviation with protrusion.   Motor: muscle tone was normal in all four extremities, muscle strength was normal in all four extremities and normal bulk in all four extremities EXCPET in left 1st digit dorsiflexion 4+/5  Sensory exam: light touch, temp was intact except for in left l5 dermatome  Coordination:. slow narrow stance. balance was intact. there was no past-pointing. no tremor present.   Deep tendon reflexes:   1+ in UE and 0 in LE  Plantar responses normal on the right, normal on the left.          Allergies    No Known Allergies    Intolerances       Home Medications:  [place] Thyroid 90 mg oral tablet: 1 tab(s) orally once a day (24 Mar 2024 10:49)  Protonix 20 mg oral delayed release tablet: 1 tab(s) orally once a day (24 Mar 2024 07:40)      MEDICATIONS  (STANDING):  enoxaparin Injectable 40 milliGRAM(s) SubCutaneous every 24 hours  pantoprazole    Tablet 40 milliGRAM(s) Oral before breakfast  thyroid 90 milliGRAM(s) Oral daily    MEDICATIONS  (PRN):  acetaminophen     Tablet .. 650 milliGRAM(s) Oral every 6 hours PRN Temp greater or equal to 38C (100.4F), Mild Pain (1 - 3)  aluminum hydroxide/magnesium hydroxide/simethicone Suspension 30 milliLiter(s) Oral every 4 hours PRN Dyspepsia  melatonin 3 milliGRAM(s) Oral at bedtime PRN Insomnia  ondansetron Injectable 4 milliGRAM(s) IV Push every 8 hours PRN Nausea and/or Vomiting      LABS:                        10.9   9.41  )-----------( 157      ( 25 Mar 2024 06:59 )             31.2     03-25    140  |  106  |  35<H>  ----------------------------<  83  4.0   |  24  |  1.4    Ca    9.1      25 Mar 2024 06:59  Phos  3.1     03-25  Mg     1.8     03-25    TPro  6.4  /  Alb  4.1  /  TBili  0.9  /  DBili  x   /  AST  41  /  ALT  30  /  AlkPhos  106  03-24            Neuro Imaging:  [facility]:   < from: CT Pelvis Bony Only No Cont (02.10.24 @ 15:31) >    PROCEDURE DATE:  02/10/2024          INTERPRETATION:  CLINICAL HISTORY / REASON FOR EXAM: Right hip pain after   physical therapy    TECHNIQUE: CT pelvis without contrast. CT right femur without contrast.   Contiguous axial CT images were obtained from the iliac crests through   the right knee without intravenous contrast. Images were processed with   soft tissue and bone kernel. Coronal and sagittal reformats were obtained.    COMPARISON: None available. Correlation with abdominal pelvic CT   4/10/2021.    FINDINGS:  CT pelvis:  No acute intrapelvic pathology. Moderate atherosclerosis. Few scattered   colonic diverticula. Normal appendix. Mildly enlarged prostate gland.   Tiny fat-containing right inguinal hernia.    L5-S1 degenerative disc disease with near complete loss of disc space.   Sacroiliac joints are intact. No acute sacral fracture. Lower lumbar   facet arthrosis. Iliac bone, ischial bone and pubic bones are intact. No   hip dislocation. No femoral neck or intertrochanteric fractures.   Tailbone/coccyx appears intact. Mild bilateral hip degenerative changes.    CT right femur:  Soft tissues appear within normal limits. Vascular calcifications. No   knee joint effusion.    Femur is intact. No acute fracture.    Knee joint is intact. No acute fracture or dislocation. Mild degenerative   changes.      IMPRESSION:    No acute bony abnormality in the pelvis or right femur.    --- End of Report ---    < end of copied text >          Assessment / Plan: This is a 89y year old Male presenting with difficulty standing likely related to mixed dehydration and known sciatica.  Exam notable for left L5 radiculopathy symptoms for which he is being worked up for.  1. Out patient physical therapy  2. follow with pain management for TAMIKA  3. Recall PRN                
CARDIOLOGY CONSULT NOTE     CHIEF COMPLAINT/REASON FOR CONSULT:    HPI:  Pt is a 89yo M witha pmhx of hypothyroidism, GERD, gait disorder, became increasingly weak today, was unable to get off the toilet because he felt generally weak symptoms described as constant and moderate, worse with activity. Pt reports not drinking enough water which he feels contributed to the episode. Pt deneis associated fevers, chills, cp, sob, cough, abd pain, n/v, hematuria, dysuria. Pt feels better now after IVF in ED.  (24 Mar 2024 10:42)      PAST MEDICAL & SURGICAL HISTORY:  Hypothyroidism      Acid reflux      S/P cholecystectomy          Cardiac Risks:   [ ]HTN, [ ] DM, [ ] Smoking, [ ] FH,  [ ] Lipids        MEDICATIONS:  MEDICATIONS  (STANDING):  enoxaparin Injectable 40 milliGRAM(s) SubCutaneous every 24 hours  pantoprazole    Tablet 40 milliGRAM(s) Oral before breakfast  thyroid 90 milliGRAM(s) Oral daily      FAMILY HISTORY:      SOCIAL HISTORY:        Allergies    No Known Allergies        	    REVIEW OF SYSTEMS:  CONSTITUTIONAL: No fever, weight loss, or fatigue  EYES: No eye pain, visual disturbances, or discharge  ENMT:  No difficulty hearing, tinnitus, vertigo; No sinus or throat pain  NECK: No pain or stiffness  RESPIRATORY: No cough, wheezing, chills or hemoptysis; No Shortness of Breath  CARDIOVASCULAR: No chest pain, palpitations, passing out, dizziness, or leg swelling  GASTROINTESTINAL: No abdominal or epigastric pain. No nausea, vomiting, or hematemesis; No diarrhea or constipation. No melena or hematochezia.  GENITOURINARY: No dysuria, frequency, hematuria, or incontinence  NEUROLOGICAL: No headaches, memory loss, loss of strength, numbness, or tremors  SKIN: No itching, burning, rashes, or lesions   	        PHYSICAL EXAM:  T(C): 36.8 (03-26-24 @ 05:03), Max: 36.8 (03-26-24 @ 05:03)  HR: 74 (03-26-24 @ 05:03) (68 - 76)  BP: 141/65 (03-26-24 @ 05:03) (128/60 - 141/65)  RR: 18 (03-26-24 @ 05:03) (18 - 24)  SpO2: 97% (03-26-24 @ 05:03) (95% - 98%)  Wt(kg): --  I&O's Summary    25 Mar 2024 07:01  -  26 Mar 2024 07:00  --------------------------------------------------------  IN: 0 mL / OUT: 250 mL / NET: -250 mL        Appearance: Normal	  Psychiatry: A & O x 3, Mood & affect appropriate  HEENT:   Normal oral mucosa, PERRL, EOMI	  Lymphatic: No lymphadenopathy  Cardiovascular: Normal S1 S2,RRR, No JVD, No murmurs  Respiratory: Lungs clear to auscultation	  Gastrointestinal:  Soft, Non-tender, + BS	  Skin: No rashes, No ecchymoses, No cyanosis	  Neurologic: Non-focal  Extremities: Normal range of motion, No clubbing, cyanosis or edema  Vascular: Peripheral pulses palpable 2+ bilaterally      ECG:  	< from: 12 Lead ECG (03.24.24 @ 06:36) >  Diagnosis Line    Normal sinus rhythm  Normal ECG    Confirmed by JD ANDERS MD ([id number]) on 3/24/2024 8:03:20 AM    < end of copied text >      	  LABS:	 	    CARDIAC MARKERS:                                    10.9   9.41  )-----------( 157      ( 25 Mar 2024 06:59 )             31.2     03-25    140  |  106  |  35<H>  ----------------------------<  83  4.0   |  24  |  1.4    Ca    9.1      25 Mar 2024 06:59  Phos  3.1     03-25  Mg     1.8     03-25

## 2024-03-26 NOTE — DISCHARGE NOTE PROVIDER - NSDCMRMEDTOKEN_GEN_ALL_CORE_FT
[place] Thyroid 90 mg oral tablet: 1 tab(s) orally once a day  Protonix 20 mg oral delayed release tablet: 1 tab(s) orally once a day

## 2024-03-26 NOTE — PROGRESS NOTE ADULT - TIME BILLING
patient seen and examined and care plan discussed with PA nursing and patient and his wife agrees with discharge
patient seen and examined and care plan reviewed and discussed with housestaff

## 2024-03-26 NOTE — DISCHARGE NOTE PROVIDER - NSDCFUSCHEDAPPT_GEN_ALL_CORE_FT
Kevin Mosqueda  [facility] Physician [facility]  NEUROSURG [facility]  Scheduled Appointment: 04/29/2024

## 2024-03-26 NOTE — DISCHARGE NOTE PROVIDER - HOSPITAL COURSE
89 yo male with inability to ambulate and elevated troponin levels without chest pain      #LE weakness  - Neuro consult appreciated - recommended outpatient PT   - PT and physiatry on board    #Elevated troponin  - denies chest pain, possible demand ischemia   - cardio consult appreciated  - Outpatient ECHO recommended  - Outpatient adenocard thallium recommended    #leukocytosis suspect reactive, no obvious infection, ua negative, cxr negative  -repeat wbc today and trend  - resolved     #hypothyroidism  -c/w armour thyroid    #GERD

## 2024-03-26 NOTE — CONSULT NOTE ADULT - ASSESSMENT
Pt is a 89yo M witha pmhx of hypothyroidism, GERD, gait disorder, became increasingly weak today, was unable to get off the toilet because he felt generally weak symptoms described as constant and moderate, worse with activity. Pt reports not drinking enough water which he feels contributed to the episode. Patient denies chest pain or sob. Encourge po fluid . Trop mildly elevated. Possible demand ischemia. Echo consider outpatient. DConsider outpatient Adenocard thallium Pt is a 89yo M witha pmhx of hypothyroidism, GERD, gait disorder, became increasingly weak today, was unable to get off the toilet because he felt generally weak symptoms described as constant and moderate, worse with activity. Pt reports not drinking enough water which he feels contributed to the episode. Patient denies chest pain or sob. Encourge po fluid . Trop mildly elevated. Possible demand ischemia. Echo consider outpatient. DConsider outpatient Adenocard thallium. Check ortho bp.

## 2024-03-26 NOTE — DISCHARGE NOTE NURSING/CASE MANAGEMENT/SOCIAL WORK - PATIENT PORTAL LINK FT
You can access the FollowMyHealth Patient Portal offered by [facility] by registering at the following website: [email or web address]. By joining [employer]’s FollowMyHealth portal, you will also be able to view your health information using other applications (apps) compatible with our system.

## 2024-03-26 NOTE — CONSULT NOTE ADULT - ASSESSMENT
IMPRESSION: Rehab of 89  y.o  m rehab  for  gd     PRECAUTIONS: [  ] Cardiac  [  ] Respiratory  [  ] Seizures [  ] Contact Isolation  [  ] Droplet Isolation  [ FALL ] Other    Weight Bearing Status:     RECOMMENDATION:    Out of Bed to Chair     DVT/Decubiti Prophylaxis    REHAB PLAN:     [  xx ] Bedside P/T 3-5 times a week   [   ]   Bedside O/T  2-3 times a week             [   ] No Rehab Therapy Indicated                   [   ]  Speech Therapy   Conditioning/ROM                                    ADL  Bed Mobility                                               Conditioning/ROM  Transfers                                                     Bed Mobility  Sitting /Standing Balance                         Transfers                                        Gait Training                                               Sitting/Standing Balance  Stair Training [   ]Applicable                    Home equipment Eval                                                                        Splinting  [   ] Only      GOALS:   ADL   [ x  ]   Independent                    Transfers  [  x ] Independent                          Ambulation  [x   ] Independent     [ x ] With device                            [ x]  CG                                                         [ x  ]  CG                                                                  [   ] CG                            [    ] Min A                                                   [   ] Min A                                                              [   ] Min  A          DISCHARGE PLAN:   [   ]  Good candidate for Intensive Rehabilitation/Hospital based-4A [facility]                                             Will tolerate 3hrs Intensive Rehab Daily                                       [    ]  Short Term Rehab in Skilled Nursing Facility                                       [ xx   ]  Home with Outpatient or VN services d/w  ptn  rehab  plan  ptn agree                                           [    ]  Possible Candidate for Intensive Hospital based Rehab

## 2024-03-26 NOTE — PROGRESS NOTE ADULT - SUBJECTIVE AND OBJECTIVE BOX
89 yo male hx esophageal stricture, spinal stenosis developed worsening lower extremety weakness could not get up from toilet admitted for leg weakness patient found to have elevated tropon levels    MEDICATIONS  (STANDING):  enoxaparin Injectable 40 milliGRAM(s) SubCutaneous every 24 hours  pantoprazole    Tablet 40 milliGRAM(s) Oral before breakfast  thyroid 90 milliGRAM(s) Oral daily                          10.9   9.41  )-----------( 157      ( 25 Mar 2024 06:59 )             31.2   03-25    140  |  106  |  35<H>  ----------------------------<  83  4.0   |  24  |  1.4    Ca    9.1      25 Mar 2024 06:59  Phos  3.1     03-25  Mg     1.8     03-25    TPro  6.4  /  Alb  4.1  /  TBili  0.9  /  DBili  x   /  AST  41  /  ALT  30  /  AlkPhos  106  03-24  Vital Signs Last 24 Hrs  T(C): 36.4 (25 Mar 2024 04:44), Max: 37.1 (24 Mar 2024 10:52)  T(F): 97.5 (25 Mar 2024 04:44), Max: 98.7 (24 Mar 2024 10:52)  HR: 70 (25 Mar 2024 04:44) (70 - 86)  BP: 114/57 (25 Mar 2024 04:44) (100/49 - 114/57)  BP(mean): 77 (24 Mar 2024 10:51) (77 - 77)  RR: 18 (25 Mar 2024 04:44) (18 - 18)  SpO2: 95% (25 Mar 2024 04:44) (95% - 97%)    Parameters below as of 25 Mar 2024 04:44  Patient On (Oxygen Delivery Method): room air    Urinalysis Basic - ( 25 Mar 2024 06:59 )    Color: x / Appearance: x / SG: x / pH: x  Gluc: 83 mg/dL / Ketone: x  / Bili: x / Urobili: x   Blood: x / Protein: x / Nitrite: x   Leuk Esterase: x / RBC: x / WBC x   Sq Epi: x / Non Sq Epi: x / Bacteria: x    
89 yo male hx spinal stenosis, aspestosis esophagitis with stricture, mild to mod AS/MSMR who was admitted for inability to get up in the bathroom   no injury patient abnormal troponin seen by cardiology no acute mi Dr Martinez recommended out patient follow up patient feeling better would like to go home                          11.5   9.48  )-----------( 169      ( 26 Mar 2024 07:06 )             33.4   03-26    140  |  105  |  32<H>  ----------------------------<  95  4.6   |  28  |  1.4    Ca    9.5      26 Mar 2024 07:06  Phos  2.8     03-26  Mg     2.0     03-26    MEDICATIONS  (STANDING):  enoxaparin Injectable 40 milliGRAM(s) SubCutaneous every 24 hours  pantoprazole    Tablet 40 milliGRAM(s) Oral before breakfast  thyroid 90 milliGRAM(s) Oral daily  Vital Signs Last 24 Hrs  T(C): 36.8 (26 Mar 2024 05:03), Max: 36.8 (26 Mar 2024 05:03)  T(F): 98.3 (26 Mar 2024 05:03), Max: 98.3 (26 Mar 2024 05:03)  HR: 74 (26 Mar 2024 05:03) (68 - 76)  BP: 141/65 (26 Mar 2024 05:03) (128/60 - 141/65)  BP(mean): --  RR: 18 (26 Mar 2024 05:03) (18 - 24)  SpO2: 97% (26 Mar 2024 05:03) (95% - 98%)    Parameters below as of 26 Mar 2024 05:03  Patient On (Oxygen Delivery Method): room air

## 2024-03-26 NOTE — DISCHARGE NOTE PROVIDER - CARE PROVIDER_API CALL
Arielle Israel  Internal Medicine  [address] [place] [id number]  Phone: [phone]  Fax: [phone]  Follow Up Time: 1 week

## 2024-03-26 NOTE — PROGRESS NOTE ADULT - EYES
Eleonore Fail, rolling (07/20/22 3742)  
PERRL/EOMI/conjunctiva clear/normal
[id number])  ANTONY hose, elevation and
Eliquis. [address]
PERRL/EOMI/conjunctiva clear/normal

## 2024-03-26 NOTE — DISCHARGE NOTE PROVIDER - NSDCCPCAREPLAN_GEN_ALL_CORE_FT
PRINCIPAL DISCHARGE DIAGNOSIS  Diagnosis: Weakness  Assessment and Plan of Treatment: - You were seen and treated by the medical team for lower extremity weakness.   - neurology was consulted, recommended outpatient physical therapy. Weakness likely secondary to known sciatica and dehydration.   - If worsening weakness, fevers, chills, dizziness, nausea, vomiting, please report back to the emergency room.         SECONDARY DISCHARGE DIAGNOSES  Diagnosis: Elevated troponin  Assessment and Plan of Treatment: - denies chest pain, possible demand ischemia   - cardiology consulted  - Outpatient ECHO recommended  - Outpatient adenocard thallium recommended  - Outpatient follow up with primary care provider and cardiology recommended     PRINCIPAL DISCHARGE DIAGNOSIS  Diagnosis: Weakness  Assessment and Plan of Treatment: - You were seen and treated by the medical team for lower extremity weakness.   - neurology was consulted, recommended outpatient physical therapy. Weakness likely secondary to known sciatica and dehydration.  - Will discharge patient home with homt PT    - If worsening weakness, fevers, chills, dizziness, nausea, vomiting, please report back to the emergency room.         SECONDARY DISCHARGE DIAGNOSES  Diagnosis: Elevated troponin  Assessment and Plan of Treatment: - denies chest pain, possible demand ischemia   - cardiology consulted  - Outpatient ECHO recommended  - Outpatient adenocard thallium recommended  - Outpatient follow up with primary care provider and cardiology recommended

## 2024-03-27 LAB
-  AMPICILLIN: SIGNIFICANT CHANGE UP
-  CIPROFLOXACIN: SIGNIFICANT CHANGE UP
-  LEVOFLOXACIN: SIGNIFICANT CHANGE UP
-  NITROFURANTOIN: SIGNIFICANT CHANGE UP
-  TETRACYCLINE: SIGNIFICANT CHANGE UP
-  VANCOMYCIN: SIGNIFICANT CHANGE UP
CULTURE RESULTS: ABNORMAL
METHOD TYPE: SIGNIFICANT CHANGE UP
ORGANISM # SPEC MICROSCOPIC CNT: ABNORMAL
ORGANISM # SPEC MICROSCOPIC CNT: SIGNIFICANT CHANGE UP
SPECIMEN SOURCE: SIGNIFICANT CHANGE UP

## 2024-04-01 DIAGNOSIS — E86.0 DEHYDRATION: ICD-10-CM

## 2024-04-01 DIAGNOSIS — K22.2 ESOPHAGEAL OBSTRUCTION: ICD-10-CM

## 2024-04-01 DIAGNOSIS — I24.89 OTHER FORMS OF ACUTE ISCHEMIC HEART DISEASE: ICD-10-CM

## 2024-04-01 DIAGNOSIS — K21.9 GASTRO-ESOPHAGEAL REFLUX DISEASE WITHOUT ESOPHAGITIS: ICD-10-CM

## 2024-04-01 DIAGNOSIS — R26.9 UNSPECIFIED ABNORMALITIES OF GAIT AND MOBILITY: ICD-10-CM

## 2024-04-01 DIAGNOSIS — J61 PNEUMOCONIOSIS DUE TO ASBESTOS AND OTHER MINERAL FIBERS: ICD-10-CM

## 2024-04-01 DIAGNOSIS — E03.9 HYPOTHYROIDISM, UNSPECIFIED: ICD-10-CM

## 2024-04-01 DIAGNOSIS — N18.30 CHRONIC KIDNEY DISEASE, STAGE 3 UNSPECIFIED: ICD-10-CM

## 2024-04-01 DIAGNOSIS — M54.30 SCIATICA, UNSPECIFIED SIDE: ICD-10-CM

## 2024-04-01 DIAGNOSIS — D72.829 ELEVATED WHITE BLOOD CELL COUNT, UNSPECIFIED: ICD-10-CM

## 2024-04-01 DIAGNOSIS — R54 AGE-RELATED PHYSICAL DEBILITY: ICD-10-CM

## 2024-04-01 DIAGNOSIS — M19.90 UNSPECIFIED OSTEOARTHRITIS, UNSPECIFIED SITE: ICD-10-CM

## 2024-04-01 DIAGNOSIS — I08.3 COMBINED RHEUMATIC DISORDERS OF MITRAL, AORTIC AND TRICUSPID VALVES: ICD-10-CM

## 2024-04-01 DIAGNOSIS — M48.00 SPINAL STENOSIS, SITE UNSPECIFIED: ICD-10-CM

## 2024-04-29 ENCOUNTER — APPOINTMENT (OUTPATIENT)
Dept: NEUROSURGERY | Facility: CLINIC | Age: 89
End: 2024-04-29
Payer: MEDICARE

## 2024-04-29 VITALS — BODY MASS INDEX: 23.49 KG/M2 | WEIGHT: 155 LBS | HEIGHT: 68 IN

## 2024-04-29 DIAGNOSIS — M54.50 LOW BACK PAIN, UNSPECIFIED: ICD-10-CM

## 2024-04-29 DIAGNOSIS — M19.90 UNSPECIFIED OSTEOARTHRITIS, UNSPECIFIED SITE: ICD-10-CM

## 2024-04-29 PROCEDURE — 99203 OFFICE O/P NEW LOW 30 MIN: CPT

## 2024-04-30 NOTE — HISTORY OF PRESENT ILLNESS
[de-identified] : This is a 89-year-old male who presents for neurosurgical consultation accompanied by his wife.  He reports longstanding isolated low back pain without radicular features into the lower extremities.  Symptoms ranged from 5-7 out of 10.  Pain worse with flexion and extension of the lumbar spine and alleviated when seated.  He notes at times when immobile for a prolonged period of time his symptoms can intensify.  Pain typically worse in the morning and alleviates with movement and/or activity during the day.  He ambulates with the assistance of a cane for quite some time but does attempt to ambulate for exercise daily.  No recent interventional treatments have been completed but he presents today to discuss candidacy for injection treatments as recommended by his orthopedist/pain management doctor.  IMAGING: X-ray L spine- [facility] 2015-evidence of diffuse degenerative change most pronounced at the L5-S1 segment No recent imaging or testing provided for my review today.  PHYSICAL EXAM:  Neurologic:  CN II-XII grossly intact Palpation: (+) lumbar paravertebral tenderness to palpation, bilateral. Strength: Full strength in all major muscle groups Sensation: Full sensation to light touch in all extremities Reflexes:   2+ patellar  2+ ankle jerk  ROM limited in all motions  Signs: SLR negative  Gait: unsteady, using cane.

## 2024-04-30 NOTE — ASSESSMENT
[FreeTextEntry1] : This is a 89-year-old male who presents for neurosurgical consultation with regards to isolated lumbago.  Patient reports that he was sent for clearance to consider spinal injections.  Unfortunately, he comes with imaging that is nearly 10 years old therefore I cannot comment based on such outdated films.  I have offered to order him an updated x-ray lumbar AP lateral.  I believe that form of imaging would suffice considering his isolated lumbago.  Patient agreeable to proceed and I will order the testing promptly and he will contact me for review.  I will not make a comment on if he can proceed with interventional treatments at this time, we must review imaging in order to make such assessment.   ATTESTATION: I personally reviewed with the PA/NP, this patient's history and physical exam findings, as documented above. I have discussed the relevant areas of concern, having direct implications to the presenting problems and illnesses, and I have personally examined all pertinent and positive and negative findings, which impact their neurosurgical treatment.    I, Dr. Mosqueda, attest that this documentation has been prepared by TIMMY Royal  under my presence and direction

## 2024-05-06 ENCOUNTER — APPOINTMENT (OUTPATIENT)
Dept: NEUROSURGERY | Facility: CLINIC | Age: 89
End: 2024-05-06

## 2024-07-15 ENCOUNTER — INPATIENT (INPATIENT)
Facility: HOSPITAL | Age: 89
LOS: 1 days | Discharge: HOME | DRG: 948 | End: 2024-07-17
Attending: INTERNAL MEDICINE | Admitting: INTERNAL MEDICINE
Payer: MEDICARE

## 2024-07-15 VITALS
SYSTOLIC BLOOD PRESSURE: 120 MMHG | DIASTOLIC BLOOD PRESSURE: 54 MMHG | HEART RATE: 67 BPM | RESPIRATION RATE: 18 BRPM | OXYGEN SATURATION: 98 % | HEIGHT: 68 IN | WEIGHT: 179.9 LBS | TEMPERATURE: 98 F

## 2024-07-15 DIAGNOSIS — R53.1 WEAKNESS: ICD-10-CM

## 2024-07-15 DIAGNOSIS — E03.9 HYPOTHYROIDISM, UNSPECIFIED: ICD-10-CM

## 2024-07-15 DIAGNOSIS — Z87.438 PERSONAL HISTORY OF OTHER DISEASES OF MALE GENITAL ORGANS: ICD-10-CM

## 2024-07-15 DIAGNOSIS — N17.9 ACUTE KIDNEY FAILURE, UNSPECIFIED: ICD-10-CM

## 2024-07-15 DIAGNOSIS — R13.10 DYSPHAGIA, UNSPECIFIED: ICD-10-CM

## 2024-07-15 DIAGNOSIS — Z90.49 ACQUIRED ABSENCE OF OTHER SPECIFIED PARTS OF DIGESTIVE TRACT: Chronic | ICD-10-CM

## 2024-07-15 LAB
ALBUMIN SERPL ELPH-MCNC: 3.9 G/DL — SIGNIFICANT CHANGE UP (ref 3.5–5.2)
ALP SERPL-CCNC: 80 U/L — SIGNIFICANT CHANGE UP (ref 30–115)
ALT FLD-CCNC: 15 U/L — SIGNIFICANT CHANGE UP (ref 0–41)
ANION GAP SERPL CALC-SCNC: 9 MMOL/L — SIGNIFICANT CHANGE UP (ref 7–14)
APPEARANCE UR: CLEAR — SIGNIFICANT CHANGE UP
AST SERPL-CCNC: 31 U/L — SIGNIFICANT CHANGE UP (ref 0–41)
BASOPHILS # BLD AUTO: 0.03 K/UL — SIGNIFICANT CHANGE UP (ref 0–0.2)
BASOPHILS NFR BLD AUTO: 0.3 % — SIGNIFICANT CHANGE UP (ref 0–1)
BILIRUB SERPL-MCNC: 0.6 MG/DL — SIGNIFICANT CHANGE UP (ref 0.2–1.2)
BILIRUB UR-MCNC: NEGATIVE — SIGNIFICANT CHANGE UP
BUN SERPL-MCNC: 34 MG/DL — HIGH (ref 10–20)
CALCIUM SERPL-MCNC: 9.5 MG/DL — SIGNIFICANT CHANGE UP (ref 8.4–10.5)
CHLORIDE SERPL-SCNC: 101 MMOL/L — SIGNIFICANT CHANGE UP (ref 98–110)
CO2 SERPL-SCNC: 26 MMOL/L — SIGNIFICANT CHANGE UP (ref 17–32)
COLOR SPEC: YELLOW — SIGNIFICANT CHANGE UP
CREAT SERPL-MCNC: 1.8 MG/DL — HIGH (ref 0.7–1.5)
DIFF PNL FLD: NEGATIVE — SIGNIFICANT CHANGE UP
EGFR: 35 ML/MIN/1.73M2 — LOW
EOSINOPHIL # BLD AUTO: 0.33 K/UL — SIGNIFICANT CHANGE UP (ref 0–0.7)
EOSINOPHIL NFR BLD AUTO: 3.3 % — SIGNIFICANT CHANGE UP (ref 0–8)
GLUCOSE SERPL-MCNC: 122 MG/DL — HIGH (ref 70–99)
GLUCOSE UR QL: NEGATIVE MG/DL — SIGNIFICANT CHANGE UP
HCT VFR BLD CALC: 37.7 % — LOW (ref 42–52)
HGB BLD-MCNC: 12.7 G/DL — LOW (ref 14–18)
IMM GRANULOCYTES NFR BLD AUTO: 0.4 % — HIGH (ref 0.1–0.3)
KETONES UR-MCNC: NEGATIVE MG/DL — SIGNIFICANT CHANGE UP
LEUKOCYTE ESTERASE UR-ACNC: NEGATIVE — SIGNIFICANT CHANGE UP
LYMPHOCYTES # BLD AUTO: 0.49 K/UL — LOW (ref 1.2–3.4)
LYMPHOCYTES # BLD AUTO: 4.8 % — LOW (ref 20.5–51.1)
MAGNESIUM SERPL-MCNC: 1.8 MG/DL — SIGNIFICANT CHANGE UP (ref 1.8–2.4)
MCHC RBC-ENTMCNC: 30 PG — SIGNIFICANT CHANGE UP (ref 27–31)
MCHC RBC-ENTMCNC: 33.7 G/DL — SIGNIFICANT CHANGE UP (ref 32–37)
MCV RBC AUTO: 89.1 FL — SIGNIFICANT CHANGE UP (ref 80–94)
MONOCYTES # BLD AUTO: 0.64 K/UL — HIGH (ref 0.1–0.6)
MONOCYTES NFR BLD AUTO: 6.3 % — SIGNIFICANT CHANGE UP (ref 1.7–9.3)
NEUTROPHILS # BLD AUTO: 8.59 K/UL — HIGH (ref 1.4–6.5)
NEUTROPHILS NFR BLD AUTO: 84.9 % — HIGH (ref 42.2–75.2)
NITRITE UR-MCNC: NEGATIVE — SIGNIFICANT CHANGE UP
NRBC # BLD: 0 /100 WBCS — SIGNIFICANT CHANGE UP (ref 0–0)
PH UR: 6 — SIGNIFICANT CHANGE UP (ref 5–8)
PLATELET # BLD AUTO: 144 K/UL — SIGNIFICANT CHANGE UP (ref 130–400)
PMV BLD: 11.3 FL — HIGH (ref 7.4–10.4)
POTASSIUM SERPL-MCNC: 4.8 MMOL/L — SIGNIFICANT CHANGE UP (ref 3.5–5)
POTASSIUM SERPL-SCNC: 4.8 MMOL/L — SIGNIFICANT CHANGE UP (ref 3.5–5)
PROT SERPL-MCNC: 6.7 G/DL — SIGNIFICANT CHANGE UP (ref 6–8)
PROT UR-MCNC: SIGNIFICANT CHANGE UP MG/DL
RBC # BLD: 4.23 M/UL — LOW (ref 4.7–6.1)
RBC # FLD: 13.8 % — SIGNIFICANT CHANGE UP (ref 11.5–14.5)
SODIUM SERPL-SCNC: 136 MMOL/L — SIGNIFICANT CHANGE UP (ref 135–146)
SP GR SPEC: 1.01 — SIGNIFICANT CHANGE UP (ref 1–1.03)
TROPONIN T, HIGH SENSITIVITY RESULT: 36 NG/L — HIGH (ref 6–21)
TROPONIN T, HIGH SENSITIVITY RESULT: 37 NG/L — HIGH (ref 6–21)
UROBILINOGEN FLD QL: 0.2 MG/DL — SIGNIFICANT CHANGE UP (ref 0.2–1)
WBC # BLD: 10.12 K/UL — SIGNIFICANT CHANGE UP (ref 4.8–10.8)
WBC # FLD AUTO: 10.12 K/UL — SIGNIFICANT CHANGE UP (ref 4.8–10.8)

## 2024-07-15 PROCEDURE — 93010 ELECTROCARDIOGRAM REPORT: CPT | Mod: 77

## 2024-07-15 PROCEDURE — 97116 GAIT TRAINING THERAPY: CPT | Mod: GP

## 2024-07-15 PROCEDURE — 87086 URINE CULTURE/COLONY COUNT: CPT

## 2024-07-15 PROCEDURE — 83735 ASSAY OF MAGNESIUM: CPT

## 2024-07-15 PROCEDURE — 99285 EMERGENCY DEPT VISIT HI MDM: CPT

## 2024-07-15 PROCEDURE — 80048 BASIC METABOLIC PNL TOTAL CA: CPT

## 2024-07-15 PROCEDURE — 85027 COMPLETE CBC AUTOMATED: CPT

## 2024-07-15 PROCEDURE — 36415 COLL VENOUS BLD VENIPUNCTURE: CPT

## 2024-07-15 PROCEDURE — 93306 TTE W/DOPPLER COMPLETE: CPT

## 2024-07-15 PROCEDURE — 71045 X-RAY EXAM CHEST 1 VIEW: CPT | Mod: 26

## 2024-07-15 PROCEDURE — 97110 THERAPEUTIC EXERCISES: CPT | Mod: GP

## 2024-07-15 PROCEDURE — 97162 PT EVAL MOD COMPLEX 30 MIN: CPT | Mod: GP

## 2024-07-15 PROCEDURE — 81003 URINALYSIS AUTO W/O SCOPE: CPT

## 2024-07-15 RX ORDER — SODIUM CHLORIDE 0.9 % (FLUSH) 0.9 %
1000 SYRINGE (ML) INJECTION ONCE
Refills: 0 | Status: COMPLETED | OUTPATIENT
Start: 2024-07-15 | End: 2024-07-15

## 2024-07-15 RX ORDER — LEVOTHYROXINE SODIUM 25 MCG
75 TABLET ORAL DAILY
Refills: 0 | Status: DISCONTINUED | OUTPATIENT
Start: 2024-07-15 | End: 2024-07-17

## 2024-07-15 RX ORDER — ACETAMINOPHEN 325 MG
650 TABLET ORAL ONCE
Refills: 0 | Status: COMPLETED | OUTPATIENT
Start: 2024-07-15 | End: 2024-07-15

## 2024-07-15 RX ORDER — LEVOTHYROXINE SODIUM 25 MCG
1 TABLET ORAL
Refills: 0 | DISCHARGE

## 2024-07-15 RX ORDER — PANTOPRAZOLE SODIUM 40 MG/10ML
40 INJECTION, POWDER, FOR SOLUTION INTRAVENOUS
Refills: 0 | Status: DISCONTINUED | OUTPATIENT
Start: 2024-07-15 | End: 2024-07-17

## 2024-07-15 RX ORDER — TAMSULOSIN HYDROCHLORIDE 0.4 MG/1
0.4 CAPSULE ORAL AT BEDTIME
Refills: 0 | Status: DISCONTINUED | OUTPATIENT
Start: 2024-07-15 | End: 2024-07-17

## 2024-07-15 RX ORDER — HEPARIN SODIUM 50 [USP'U]/ML
5000 INJECTION, SOLUTION INTRAVENOUS EVERY 8 HOURS
Refills: 0 | Status: DISCONTINUED | OUTPATIENT
Start: 2024-07-15 | End: 2024-07-17

## 2024-07-15 RX ORDER — PANTOPRAZOLE SODIUM 40 MG/10ML
1 INJECTION, POWDER, FOR SOLUTION INTRAVENOUS
Refills: 0 | DISCHARGE

## 2024-07-15 RX ORDER — SODIUM CHLORIDE 0.9 % (FLUSH) 0.9 %
1000 SYRINGE (ML) INJECTION
Refills: 0 | Status: DISCONTINUED | OUTPATIENT
Start: 2024-07-15 | End: 2024-07-16

## 2024-07-15 RX ORDER — TAMSULOSIN HYDROCHLORIDE 0.4 MG/1
1 CAPSULE ORAL
Refills: 0 | DISCHARGE

## 2024-07-15 RX ADMIN — Medication 2000 MILLILITER(S): at 08:21

## 2024-07-15 RX ADMIN — Medication 50 MILLILITER(S): at 18:29

## 2024-07-15 RX ADMIN — HEPARIN SODIUM 5000 UNIT(S): 50 INJECTION, SOLUTION INTRAVENOUS at 21:26

## 2024-07-15 RX ADMIN — Medication 650 MILLIGRAM(S): at 08:22

## 2024-07-15 RX ADMIN — TAMSULOSIN HYDROCHLORIDE 0.4 MILLIGRAM(S): 0.4 CAPSULE ORAL at 21:26

## 2024-07-15 NOTE — ED PROVIDER NOTE - PHYSICAL EXAMINATION
CONSTITUTIONAL: in no apparent distress.   HEAD: Normocephalic; atraumatic.   EYES: Pupils are round and reactive, extra-ocular muscles are intact. Eyelids are normal in appearance without swelling or lesions.   ENT: Hearing is intact with good acuity to spoken voice.  Patient is speaking clearly, not muffled and airway is intact.   RESPIRATORY: No signs of respiratory distress. Lung sounds are clear in all lobes bilaterally without rales, rhonchi, or wheezes.  CARDIOVASCULAR: Regular rate and rhythm.   GI: Abdomen is soft, non-tender, and without distention. Bowel sounds are present and normoactive in all four quadrants. No masses are noted.   BACK: No evidence of trauma or deformity. No midline tenderness. No CVA tenderness bilaterally. Normal ROM.   NEURO: A & O x 3. Normal speech. Visual fields are full to confrontation. Pupils are equally reactive to light. Extraocular movement is intact. There is no eye deviation. Facial sensation is intact to light touch. Face is symmetric with normal eye closure and smile. Hearing is normal to spoken voice. Phonation is normal. No upper or lower extremity drift.  PSYCHOLOGICAL: Appropriate mood and affect. Good judgement and insight.

## 2024-07-15 NOTE — ED PROVIDER NOTE - OBJECTIVE STATEMENT
Lab Results   Component Value Date    HGBA1C 8.5 (H) 01/22/2024       Recent Labs     03/05/24  1627 03/05/24  2101 03/06/24  0755 03/06/24  1154   POCGLU 226* 159* 191* 301*       Blood Sugar Average: Last 72 hrs:    Continue with home regimen of Lantus 45 units at bedtime, insulin lispro 15 units 3 times daily AC  Additional coverage with insulin on sliding scale  Carb consistent diet   89-year-old male with a past medical history of hypothyroidism, GERD, gait disorder, and BPH who presents with general weakness and nausea.  Reports that general weakness started a few days ago and today symptom became more severe to the point where he had to lower himself down to the floor because of the weakness prior to arrival, so I have called the ambulance.  Also endorses nausea, but denies vomiting.  Also endorses intermittent chronic back pain.  Denies fever, shortness of breath, chest pain, vomiting, abdominal pain, urinary symptoms, and change with bowel movement.  Denies slurred speech, vision change, and focal weakness.

## 2024-07-15 NOTE — ED ADULT NURSE NOTE - NSFALLHARMRISKINTERV_ED_ALL_ED

## 2024-07-15 NOTE — H&P ADULT - PROBLEM SELECTOR PLAN 2
baseline Creat 1.4  received 1 L fluid in ER  continue maintenance fluidsa  patient stops drinking fluids at 6PM so he won't need to get up to urinate at night baseline Creat 1.4  received 1 L fluid in ER  continue maintenance fluids at 50ml/hr  patient stops drinking fluids at 6PM so he won't need to get up to urinate at night

## 2024-07-15 NOTE — H&P ADULT - NSICDXPASTMEDICALHX_GEN_ALL_CORE_FT
PAST MEDICAL HISTORY:  Acid reflux     History of BPH     Hypothyroidism     Osteoarthritis     Vitiligo

## 2024-07-15 NOTE — H&P ADULT - PROBLEM SELECTOR PLAN 1
Admit ot Medicine  PT evaluation  OOB to chair with assistance  VTE prophylaxis  order UA to r/o UTI

## 2024-07-15 NOTE — H&P ADULT - NSHPPHYSICALEXAM_GEN_ALL_CORE
Vital Signs Last 24 Hrs    T(F): 97.8 (07-15-24 @ 12:07), Max: 97.8 (07-15-24 @ 12:07)  HR: 76 (07-15-24 @ 12:07) (67 - 76)  BP: 114/58 (07-15-24 @ 12:07)  RR: 18 (07-15-24 @ 12:07) (18 - 20)  SpO2: 98% (07-15-24 @ 12:07) (98% - 98%) Vital Signs Last 24 Hrs    T(F): 97.8 (07-15-24 @ 12:07), Max: 97.8 (07-15-24 @ 12:07)  HR: 76 (07-15-24 @ 12:07) (67 - 76)  BP: 114/58 (07-15-24 @ 12:07)  RR: 18 (07-15-24 @ 12:07) (18 - 20)  SpO2: 98% (07-15-24 @ 12:07) (98% - 98%)    PHYSICAL EXAM:      Constitutional: NAD, A&O x3    Eyes: PERRLA    Respiratory: +air entry, no rales, no rhonchi, no wheezes    Cardiovascular: +S1 and S2, regular rate and rhythm    Gastrointestinal: +BS, soft, non-tender, not distended    Extremities:  no edema, no calf tenderness    Vascular: +dorsal pedis and radial pulses, no extremity cyanosis    Neurological: sensation intact, ROM equal B/L, CN II-XII intact    Skin: **Whitish patches on skin

## 2024-07-15 NOTE — PATIENT PROFILE ADULT - FALL HARM RISK - HARM RISK INTERVENTIONS

## 2024-07-15 NOTE — ED PROVIDER NOTE - PROGRESS NOTE DETAILS
Patient is admitted for general weakness. Spoke with Dr. Israel who accepted the patient and requested UA and will follow the result. Pt is aware of the plan and agrees. Pt has been endorsed to medicine PA

## 2024-07-15 NOTE — H&P ADULT - NSHPSOCIALHISTORY_GEN_ALL_CORE
Tobacco use:  x 30 yrs, 1 PPD, stopped 1983  EtOH use: No  Illicit drug use: No  Marital Status:   Ambulated with a cane

## 2024-07-15 NOTE — ED PROVIDER NOTE - BIRTH SEX
"Chief Complaint   Patient presents with     RECHECK       Initial /70 (BP Location: Left arm, Patient Position: Chair, Cuff Size: Adult Regular)  Pulse 99  Temp 98.2  F (36.8  C) (Oral)  Ht 1.588 m (5' 2.5\")  Wt 68.5 kg (151 lb)  SpO2 99%  BMI 27.18 kg/m2 Estimated body mass index is 27.18 kg/(m^2) as calculated from the following:    Height as of this encounter: 1.588 m (5' 2.5\").    Weight as of this encounter: 68.5 kg (151 lb).  Medication Reconciliation: complete    Have you ever seen a rheumatologist Yes Who You When 4/17/18  Joint pain history  Onset: pt Is here for a follow-up states that she started to get lesions on her legs , face and arm. Hurts all over, lower back is very painful. Right hand and wrist area are numb  Involved joints: see above  Pain scale:  7/10     Wakes the patient from sleep : Yes/ does not go to sleep till late  Morning stiffness:Yes for 4-5 hours minutes  Meds used:colchicine, otezla has  Not started otezla yet due to extreme nausea     Interim history  Since last visit:  1. Infections - Yes/ had a bacterial infection in her pelvic area was hospitalized  2. New symptoms/medical problem - Yes/ hands are swelling up. Having orthopedic issues. Was having problem with her veins sticking up, and very painful. Has happened multiply times   3. Any side effects from Rheum medications -see above  3. ER visits/Hospitalizations/surgeries - Yes/ hospital and ER for bacterial infection  4. Last PCP visit: 4/24/18  Wt Readings from Last 4 Encounters:   07/17/18 68.5 kg (151 lb)   06/01/18 67.6 kg (149 lb)   05/29/18 67.6 kg (149 lb)   05/15/18 67.1 kg (148 lb)     BP Readings from Last 3 Encounters:   07/17/18 108/70   06/01/18 102/60   05/29/18 99/62       "
Male

## 2024-07-15 NOTE — H&P ADULT - NSHPLABSRESULTS_GEN_ALL_CORE
12.7   10.12 )-----------( 144      ( 15 Jul 2024 07:50 )             37.7       07-15    136  |  101  |  34<H>  ----------------------------<  122<H>  Baseline Creat  1.4  4.8   |  26  |  1.8<H>    Ca    9.5      15 Jul 2024 07:50  Mg     1.8     07-15    TPro  6.7  /  Alb  3.9  /  TBili  0.6  /  DBili  x   /  AST  31  /  ALT  15  /  AlkPhos  80  07-15        Urinalysis Basic - ( 15 Jul 2024 07:50 )    Troponin T, High Sensitivity Result: 37    Magnesium: 1.8    Xray Chest 1 View- PORTABLE-Urgent (07.15.24 @ 07:46) >      Findings/  impression:    Extensive calcified bilateral pleural plaque limits evaluation for   underlying opacities by plain film. No definite pneumothorax.   Atherosclerotic aorta. Osseous degenerative changes.                          CAPILLARY BLOOD GLUCOSE 12.7   10.12 )-----------( 144      ( 15 Jul 2024 07:50 )             37.7       07-15    136  |  101  |  34<H>  ----------------------------<  122<H>  Baseline Creat  1.4  4.8   |  26  |  1.8<H>    Ca    9.5      15 Jul 2024 07:50  Mg     1.8     07-15    TPro  6.7  /  Alb  3.9  /  TBili  0.6  /  DBili  x   /  AST  31  /  ALT  15  /  AlkPhos  80  07-15        Urinalysis Basic - ( 15 Jul 2024 07:50 )    Troponin T, High Sensitivity Result: 37,  36    Magnesium: 1.8    Xray Chest 1 View- PORTABLE-Urgent (07.15.24 @ 07:46) >      Findings/  impression:    Extensive calcified bilateral pleural plaque limits evaluation for   underlying opacities by plain film. No definite pneumothorax.   Atherosclerotic aorta. Osseous degenerative changes.                          CAPILLARY BLOOD GLUCOSE

## 2024-07-15 NOTE — ED ADULT NURSE REASSESSMENT NOTE - NS ED NURSE REASSESS COMMENT FT1
Received patient, patient report that when he ate breakfast box eggs and potatoes he feels like the food is stuck in his throat. No vomiting, denies difficultly breathing and denies chest pain. V/S retaken. MD Benson at bedside, as per MD okay for patient to drink apple juice. Patient states "It feels like the juice is going down". Patient is a/o x 3

## 2024-07-15 NOTE — ED ADULT TRIAGE NOTE - CHIEF COMPLAINT QUOTE
BIBA, pt c/o back pain and weakness x1 month, "today I had difficulty walking so I lowered myself to the ground, I also been feeling nauseous"

## 2024-07-15 NOTE — ED PROVIDER NOTE - CLINICAL SUMMARY MEDICAL DECISION MAKING FREE TEXT BOX
89-year-old male with past medical history of hypothyroidism, GERD, gait disorder, BPH, presents with generalized fatigue and nausea.  Has been for the last few days but got to the point when he felt like he was going to fall so he lowered himself to the floor.  No head trauma or actual fall.  No vomiting, abdominal pain or chest pain.  Denies trouble breathing.  On exam fatigued appearing but nontoxic, vital signs noted, normocephalic, atraumatic, cranial nerves II through XII intact, 5 out of 5 strength throughout in the stretcher, heart regular no murmurs, lungs clear bilaterally, abdomen benign.  Labs with EUGENE, elevated troponin but negative delta.  EKG nonischemic.  Chest x-ray abnormal but similar to prior, urinalysis reassuring.  Patient does not feel safe going home given it would be too much for his elderly wife and they have no additional help.  Will admit to medicine for further monitoring and treatment

## 2024-07-15 NOTE — H&P ADULT - HISTORY OF PRESENT ILLNESS
89-year-old male with a past medical history of Hypothyroidism, GERD, gait disorder, Osteoarthritis and BPH.  He presents with general weakness.  Reports general weakness started a few days ago and today symptom became more severe to the point where he had to lower himself down to the floor in his bathroom because of this  weakness.  He could not get up off the floor so his wife  called the ambulance.  - there was no loss of consciousness or chest pain     The patient also repots he feels like food is getting " Stuck " occasionally in his mid chest area: likely the esophagus                             89-year-old male with a past medical history of Hypothyroidism, GERD, gait disorder, Osteoarthritis and BPH.  He presents with general weakness.  Reports general weakness started a few days ago and today symptom became more severe to the point where he had to lower himself down to the floor in his bathroom because of this  weakness.  He could not get up off the floor so his wife  called the ambulance.  - there was no loss of consciousness or chest pain     The patient also repots he feels like food is getting " Stuck " occasionally in his mid chest area: likely the esophagus. as per Dr Israel : patient has a known Hx of esophageal stricture

## 2024-07-15 NOTE — H&P ADULT - PROBLEM SELECTOR PLAN 3
Speech therapy evaluation  will likely need Modified Barium swallow exam to evaluate for esophageal obstruction  Diet: chopped consistency Hx of esophgeal stricture  Diet: chopped consistency, soft

## 2024-07-15 NOTE — ED ADULT NURSE REASSESSMENT NOTE - NS ED NURSE REASSESS COMMENT FT1
Patient said "I don't feel like anything is stuck". Patient denies pain/discomfort, speaking clearly, no signs of respiratory distress, difficulty breathing. Denies nausea /vomiting /abdominal pain. Denies chest pain. Patient tolerated drinking juice.

## 2024-07-15 NOTE — ED PROVIDER NOTE - EKG/XRAY ADDITIONAL INFORMATION
EKG normal sinus rhythm, normal axis, first-degree AV block, , isolated T wave inversion in lead III, no ST depression or elevation

## 2024-07-15 NOTE — H&P ADULT - ASSESSMENT
89 y/o male admitted with c/o weakness, and also found to have EUGENE.                               89 y/o male admitted with c/o weakness, and also found to have EUGENE.    Case with discussed with Dr Israel

## 2024-07-16 ENCOUNTER — RESULT REVIEW (OUTPATIENT)
Age: 89
End: 2024-07-16

## 2024-07-16 LAB
ANION GAP SERPL CALC-SCNC: 10 MMOL/L — SIGNIFICANT CHANGE UP (ref 7–14)
BUN SERPL-MCNC: 31 MG/DL — HIGH (ref 10–20)
CALCIUM SERPL-MCNC: 8.6 MG/DL — SIGNIFICANT CHANGE UP (ref 8.4–10.5)
CHLORIDE SERPL-SCNC: 104 MMOL/L — SIGNIFICANT CHANGE UP (ref 98–110)
CO2 SERPL-SCNC: 24 MMOL/L — SIGNIFICANT CHANGE UP (ref 17–32)
CREAT SERPL-MCNC: 1.6 MG/DL — HIGH (ref 0.7–1.5)
EGFR: 40 ML/MIN/1.73M2 — LOW
GLUCOSE SERPL-MCNC: 81 MG/DL — SIGNIFICANT CHANGE UP (ref 70–99)
HCT VFR BLD CALC: 33 % — LOW (ref 42–52)
HGB BLD-MCNC: 11.1 G/DL — LOW (ref 14–18)
MCHC RBC-ENTMCNC: 30.3 PG — SIGNIFICANT CHANGE UP (ref 27–31)
MCHC RBC-ENTMCNC: 33.6 G/DL — SIGNIFICANT CHANGE UP (ref 32–37)
MCV RBC AUTO: 90.2 FL — SIGNIFICANT CHANGE UP (ref 80–94)
NRBC # BLD: 0 /100 WBCS — SIGNIFICANT CHANGE UP (ref 0–0)
PLATELET # BLD AUTO: 143 K/UL — SIGNIFICANT CHANGE UP (ref 130–400)
PMV BLD: 11.4 FL — HIGH (ref 7.4–10.4)
POTASSIUM SERPL-MCNC: 4.3 MMOL/L — SIGNIFICANT CHANGE UP (ref 3.5–5)
POTASSIUM SERPL-SCNC: 4.3 MMOL/L — SIGNIFICANT CHANGE UP (ref 3.5–5)
RBC # BLD: 3.66 M/UL — LOW (ref 4.7–6.1)
RBC # FLD: 14.1 % — SIGNIFICANT CHANGE UP (ref 11.5–14.5)
SODIUM SERPL-SCNC: 138 MMOL/L — SIGNIFICANT CHANGE UP (ref 135–146)
WBC # BLD: 6.7 K/UL — SIGNIFICANT CHANGE UP (ref 4.8–10.8)
WBC # FLD AUTO: 6.7 K/UL — SIGNIFICANT CHANGE UP (ref 4.8–10.8)

## 2024-07-16 PROCEDURE — 93306 TTE W/DOPPLER COMPLETE: CPT | Mod: 26

## 2024-07-16 PROCEDURE — 99223 1ST HOSP IP/OBS HIGH 75: CPT

## 2024-07-16 RX ORDER — MAGNESIUM, ALUMINUM HYDROXIDE 400-400
30 TABLET,CHEWABLE ORAL EVERY 4 HOURS
Refills: 0 | Status: DISCONTINUED | OUTPATIENT
Start: 2024-07-16 | End: 2024-07-17

## 2024-07-16 RX ORDER — ACETAMINOPHEN 325 MG
650 TABLET ORAL EVERY 6 HOURS
Refills: 0 | Status: DISCONTINUED | OUTPATIENT
Start: 2024-07-16 | End: 2024-07-17

## 2024-07-16 RX ORDER — ONDANSETRON HYDROCHLORIDE 2 MG/ML
4 INJECTION INTRAMUSCULAR; INTRAVENOUS EVERY 8 HOURS
Refills: 0 | Status: DISCONTINUED | OUTPATIENT
Start: 2024-07-16 | End: 2024-07-17

## 2024-07-16 RX ORDER — DEXTROSE MONOHYDRATE AND SODIUM CHLORIDE 5; .3 G/100ML; G/100ML
1000 INJECTION, SOLUTION INTRAVENOUS
Refills: 0 | Status: DISCONTINUED | OUTPATIENT
Start: 2024-07-16 | End: 2024-07-17

## 2024-07-16 RX ORDER — POLYETHYLENE GLYCOL 3350 1 G/G
17 POWDER ORAL DAILY
Refills: 0 | Status: DISCONTINUED | OUTPATIENT
Start: 2024-07-16 | End: 2024-07-17

## 2024-07-16 RX ORDER — PANTOPRAZOLE SODIUM 40 MG/10ML
40 INJECTION, POWDER, FOR SOLUTION INTRAVENOUS ONCE
Refills: 0 | Status: DISCONTINUED | OUTPATIENT
Start: 2024-07-16 | End: 2024-07-17

## 2024-07-16 RX ADMIN — HEPARIN SODIUM 5000 UNIT(S): 50 INJECTION, SOLUTION INTRAVENOUS at 21:49

## 2024-07-16 RX ADMIN — DEXTROSE MONOHYDRATE AND SODIUM CHLORIDE 75 MILLILITER(S): 5; .3 INJECTION, SOLUTION INTRAVENOUS at 15:00

## 2024-07-16 RX ADMIN — POLYETHYLENE GLYCOL 3350 17 GRAM(S): 1 POWDER ORAL at 15:12

## 2024-07-16 RX ADMIN — Medication 75 MICROGRAM(S): at 05:43

## 2024-07-16 RX ADMIN — HEPARIN SODIUM 5000 UNIT(S): 50 INJECTION, SOLUTION INTRAVENOUS at 05:43

## 2024-07-16 RX ADMIN — TAMSULOSIN HYDROCHLORIDE 0.4 MILLIGRAM(S): 0.4 CAPSULE ORAL at 21:49

## 2024-07-16 RX ADMIN — HEPARIN SODIUM 5000 UNIT(S): 50 INJECTION, SOLUTION INTRAVENOUS at 14:56

## 2024-07-16 RX ADMIN — PANTOPRAZOLE SODIUM 40 MILLIGRAM(S): 40 INJECTION, POWDER, FOR SOLUTION INTRAVENOUS at 05:43

## 2024-07-16 NOTE — PHYSICAL THERAPY INITIAL EVALUATION ADULT - GAIT DEVIATIONS NOTED, PT EVAL
stooped posture, dec heel strike/pushoff, tends to move walker too far forward,/decreased nando/decreased step length/decreased weight-shifting ability

## 2024-07-16 NOTE — CONSULT NOTE ADULT - ATTENDING COMMENTS
89yo male with intermittent dysphagia, currently tolerating diet. Consider esophagram to further evaluate. If symptoms recur/persist and pending cardiology evaluation would consider EGD.

## 2024-07-16 NOTE — PHYSICAL THERAPY INITIAL EVALUATION ADULT - LEVEL OF INDEPENDENCE: STAIR NEGOTIATION, REHAB EVAL
unable to perform secondary to unsteady gait when ambulating on level with rolling walker at this time

## 2024-07-16 NOTE — CONSULT NOTE ADULT - SUBJECTIVE AND OBJECTIVE BOX
Chief complaint/Reason for consult: chronic dysphagia    HPI: 89-year-old male with a past medical history of Hypothyroidism, GERD, gait disorder, Osteoarthritis and BPH.  He presents with general weakness.  Reports general weakness started a few days ago and today symptom became more severe to the point where he had to lower himself down to the floor in his bathroom because of this  weakness.  He could not get up off the floor so his wife  called the ambulance.  - there was no loss of consciousness or chest pain   The patient also repots he feels like food is getting " Stuck " occasionally in his mid chest area: likely the esophagus. as per Dr Israel : patient has a known Hx of esophageal stricture (15 Jul 2024 13:22)    GI updates: 89yMale [facility] GERD, gait disorder, osteoarthritis, and BPH presents for weakness. Patient reports      PAST MEDICAL & SURGICAL HISTORY:  Hypothyroidism      Acid reflux      Osteoarthritis      History of BPH      Vitiligo      S/P cholecystectomy            Family history:  FAMILY HISTORY:    No GI cancers in first or second degree relatives    Social History: No smoking. No alcohol. No illegal drug use.    Allergies:   No Known Allergies  Intolerances      MEDICATIONS: Home Medications:  Flomax 0.4 mg oral capsule: 1 cap(s) orally once a day (at bedtime) (15 Jul 2024 14:04)  levothyroxine 75 mcg (0.075 mg) oral tablet: 1 tab(s) orally once a day (15 Jul 2024 14:04)  Protonix 40 mg oral delayed release tablet: 1 tab(s) orally once a day (15 Jul 2024 14:04)    MEDICATIONS  (STANDING):  heparin   Injectable 5000 Unit(s) SubCutaneous every 8 hours  levothyroxine 75 MICROGram(s) Oral daily  pantoprazole    Tablet 40 milliGRAM(s) Oral before breakfast  polyethylene glycol 3350 17 Gram(s) Oral daily  sodium chloride 0.45%. 1000 milliLiter(s) (75 mL/Hr) IV Continuous <Continuous>  tamsulosin 0.4 milliGRAM(s) Oral at bedtime          REVIEW OF SYSTEMS  General:  No weight loss, fevers, or chills.  Eyes:  No reported pain or visual changes  ENT:  No sore throat or runny nose.  NECK: No stiffness or lymphadenopathy  CV:  No chest pain or palpitations.  Resp:  No shortness of breath, cough, wheezing or hemoptysis  GI:  No abdominal pain, nausea, vomiting, dysphagia, diarrhea or constipation. No rectal bleeding, melena, or hematemesis.  Muscle:  No aches or weakness  Neuro:  No tingling, numbness       VITALS:   T(F): 97.7 (07-16-24 @ 05:02), Max: 99.1 (07-15-24 @ 20:23)  HR: 69 (07-16-24 @ 05:02) (69 - 87)  BP: 115/58 (07-16-24 @ 05:02) (114/62 - 116/66)  RR: 16 (07-16-24 @ 05:02) (16 - 17)  SpO2: 95% (07-16-24 @ 05:02) (95% - 98%)    PHYSICAL EXAM:  GENERAL: AAOx3, no acute distress.  HEAD:  Atraumatic, Normocephalic  EYES: conjunctiva and sclera clear  NECK: Supple, No thyromegaly   CHEST/LUNG: Clear to auscultation bilaterally; No wheeze, rhonchi, or rales  HEART: Regular rate and rhythm; normal S1, S2, No murmurs.  ABDOMEN: Soft, nontender, nondistended; Bowel sounds present  NEUROLOGY: No asterixis or tremor  SKIN: Intact, no jaundice          LABS:  07-16    138  |  104  |  31<H>  ----------------------------<  81  4.3   |  24  |  1.6<H>    Ca    8.6      16 Jul 2024 06:57  Mg     1.8     07-15    TPro  6.7  /  Alb  3.9  /  TBili  0.6  /  DBili  x   /  AST  31  /  ALT  15  /  AlkPhos  80  07-15                          11.1   6.70  )-----------( 143      ( 16 Jul 2024 06:57 )             33.0     LIVER FUNCTIONS - ( 15 Jul 2024 07:50 )  Alb: 3.9 g/dL / Pro: 6.7 g/dL / ALK PHOS: 80 U/L / ALT: 15 U/L / AST: 31 U/L / GGT: x               IMAGING:         Chief complaint/Reason for consult: chronic dysphagia    HPI: 89-year-old male with a past medical history of Hypothyroidism, GERD, gait disorder, Osteoarthritis and BPH.  He presents with general weakness.  Reports general weakness started a few days ago and today symptom became more severe to the point where he had to lower himself down to the floor in his bathroom because of this  weakness.  He could not get up off the floor so his wife  called the ambulance.  - there was no loss of consciousness or chest pain   The patient also repots he feels like food is getting " Stuck " occasionally in his mid chest area: likely the esophagus. as per Dr Israel : patient has a known Hx of esophageal stricture (15 Jul 2024 13:22)    GI updates: 89yMale [facility] GERD, gait disorder, osteoarthritis, and BPH presents for weakness. Patient reports dysphagia for years, he had spaghetti one year ago and choked on it and spit it up. Patient reports similar thing happened yesterday and he spit it up and he is fine. Patient is able to eat currently has no globus sensation or dysphagia. Currently Patient denies nausea, vomiting, hematemesis, melena, blood in stool, diarrhea, constipation, abdominal pain.      PAST MEDICAL & SURGICAL HISTORY:  Hypothyroidism      Acid reflux      Osteoarthritis      History of BPH      Vitiligo      S/P cholecystectomy            Family history:  FAMILY HISTORY:    No GI cancers in first or second degree relatives    Social History: No smoking. No alcohol. No illegal drug use.    Allergies:   No Known Allergies  Intolerances      MEDICATIONS: Home Medications:  Flomax 0.4 mg oral capsule: 1 cap(s) orally once a day (at bedtime) (15 Jul 2024 14:04)  levothyroxine 75 mcg (0.075 mg) oral tablet: 1 tab(s) orally once a day (15 Jul 2024 14:04)  Protonix 40 mg oral delayed release tablet: 1 tab(s) orally once a day (15 Jul 2024 14:04)    MEDICATIONS  (STANDING):  heparin   Injectable 5000 Unit(s) SubCutaneous every 8 hours  levothyroxine 75 MICROGram(s) Oral daily  pantoprazole    Tablet 40 milliGRAM(s) Oral before breakfast  polyethylene glycol 3350 17 Gram(s) Oral daily  sodium chloride 0.45%. 1000 milliLiter(s) (75 mL/Hr) IV Continuous <Continuous>  tamsulosin 0.4 milliGRAM(s) Oral at bedtime          REVIEW OF SYSTEMS  General:  No weight loss, fevers, or chills.  Eyes:  No reported pain or visual changes  ENT:  No sore throat or runny nose.  NECK: No stiffness or lymphadenopathy  CV:  No chest pain or palpitations.  Resp:  No shortness of breath, cough, wheezing or hemoptysis  GI:  No abdominal pain, nausea, vomiting, +dysphagia, no diarrhea or constipation. No rectal bleeding, melena, or hematemesis.  Muscle:  No aches or weakness  Neuro:  No tingling, numbness       VITALS:   T(F): 97.7 (07-16-24 @ 05:02), Max: 99.1 (07-15-24 @ 20:23)  HR: 69 (07-16-24 @ 05:02) (69 - 87)  BP: 115/58 (07-16-24 @ 05:02) (114/62 - 116/66)  RR: 16 (07-16-24 @ 05:02) (16 - 17)  SpO2: 95% (07-16-24 @ 05:02) (95% - 98%)    PHYSICAL EXAM:  GENERAL: AAOx3, no acute distress.  HEAD:  Atraumatic, Normocephalic  EYES: conjunctiva and sclera clear  NECK: Supple, No thyromegaly   CHEST/LUNG: Clear to auscultation bilaterally; No wheeze, rhonchi, or rales  HEART: Regular rate and rhythm; normal S1, S2, No murmurs.  ABDOMEN: Soft, nontender, nondistended; Bowel sounds present  NEUROLOGY: No asterixis or tremor  SKIN: Intact, no jaundice          LABS:  07-16    138  |  104  |  31<H>  ----------------------------<  81  4.3   |  24  |  1.6<H>    Ca    8.6      16 Jul 2024 06:57  Mg     1.8     07-15    TPro  6.7  /  Alb  3.9  /  TBili  0.6  /  DBili  x   /  AST  31  /  ALT  15  /  AlkPhos  80  07-15                          11.1   6.70  )-----------( 143      ( 16 Jul 2024 06:57 )             33.0     LIVER FUNCTIONS - ( 15 Jul 2024 07:50 )  Alb: 3.9 g/dL / Pro: 6.7 g/dL / ALK PHOS: 80 U/L / ALT: 15 U/L / AST: 31 U/L / GGT: x               IMAGING:    < from: Xray Chest 1 View- PORTABLE-Urgent (07.15.24 @ 07:46) >  ACC: [id number] EXAM:  XR CHEST PORTABLE URGENT 1V   ORDERED BY: FIORDALIZA PUENTES     PROCEDURE DATE:  07/15/2024          INTERPRETATION:  Clinical History / Reason for exam: Weakness    Comparison : Chest radiograph 3/24/2024.    Technique/Positioning: Frontal chest radiograph.    Findings/  impression:    Extensive calcified bilateral pleural plaque limits evaluation for   underlying opacities by plain film. No definite pneumothorax.   Atherosclerotic aorta. Osseous degenerative changes.    --- End of Report ---            LISETTE ZARCO MD; Attending Radiologist  This document has been electronically signed. Jul 15 2024  8:46AM    < end of copied text >       Chief complaint/Reason for consult: chronic dysphagia    HPI: 89-year-old male with a past medical history of Hypothyroidism, GERD, gait disorder, Osteoarthritis and BPH.  He presents with general weakness.  Reports general weakness started a few days ago and today symptom became more severe to the point where he had to lower himself down to the floor in his bathroom because of this  weakness.  He could not get up off the floor so his wife  called the ambulance.  - there was no loss of consciousness or chest pain   The patient also repots he feels like food is getting " Stuck " occasionally in his mid chest area: likely the esophagus. as per Dr Israel : patient has a known Hx of esophageal stricture (15 Jul 2024 13:22)    GI updates: 89y[facility] GERD, gait disorder, osteoarthritis, and BPH presents for weakness. Patient reports dysphagia for years, he had spaghetti one year ago and choked on it and spit it up. Patient reports similar thing happened yesterday and he spit it up and he is fine. Patient is able to eat currently has no globus sensation or dysphagia. Currently Patient denies nausea, vomiting, hematemesis, melena, blood in stool, diarrhea, constipation, abdominal pain.  patient reports he ate really quickly    PAST MEDICAL & SURGICAL HISTORY:  Hypothyroidism      Acid reflux      Osteoarthritis      History of BPH      Vitiligo      S/P cholecystectomy            Family history:  FAMILY HISTORY:    No GI cancers in first or second degree relatives    Social History: No smoking. No alcohol. No illegal drug use.    Allergies:   No Known Allergies  Intolerances      MEDICATIONS: Home Medications:  Flomax 0.4 mg oral capsule: 1 cap(s) orally once a day (at bedtime) (15 Jul 2024 14:04)  levothyroxine 75 mcg (0.075 mg) oral tablet: 1 tab(s) orally once a day (15 Jul 2024 14:04)  Protonix 40 mg oral delayed release tablet: 1 tab(s) orally once a day (15 Jul 2024 14:04)    MEDICATIONS  (STANDING):  heparin   Injectable 5000 Unit(s) SubCutaneous every 8 hours  levothyroxine 75 MICROGram(s) Oral daily  pantoprazole    Tablet 40 milliGRAM(s) Oral before breakfast  polyethylene glycol 3350 17 Gram(s) Oral daily  sodium chloride 0.45%. 1000 milliLiter(s) (75 mL/Hr) IV Continuous <Continuous>  tamsulosin 0.4 milliGRAM(s) Oral at bedtime          REVIEW OF SYSTEMS  General:  No weight loss, fevers, or chills.  Eyes:  No reported pain or visual changes  ENT:  No sore throat or runny nose.  NECK: No stiffness or lymphadenopathy  CV:  No chest pain or palpitations.  Resp:  No shortness of breath, cough, wheezing or hemoptysis  GI:  No abdominal pain, nausea, vomiting, +dysphagia, no diarrhea or constipation. No rectal bleeding, melena, or hematemesis.  Muscle:  No aches or weakness  Neuro:  No tingling, numbness       VITALS:   T(F): 97.7 (07-16-24 @ 05:02), Max: 99.1 (07-15-24 @ 20:23)  HR: 69 (07-16-24 @ 05:02) (69 - 87)  BP: 115/58 (07-16-24 @ 05:02) (114/62 - 116/66)  RR: 16 (07-16-24 @ 05:02) (16 - 17)  SpO2: 95% (07-16-24 @ 05:02) (95% - 98%)    PHYSICAL EXAM:  GENERAL: AAOx3, no acute distress.  HEAD:  Atraumatic, Normocephalic  EYES: conjunctiva and sclera clear  NECK: Supple, No thyromegaly   CHEST/LUNG: Clear to auscultation bilaterally; No wheeze, rhonchi, or rales  HEART: Regular rate and rhythm; normal S1, S2, No murmurs.  ABDOMEN: Soft, nontender, nondistended; Bowel sounds present  NEUROLOGY: No asterixis or tremor  SKIN: Intact, no jaundice          LABS:  07-16    138  |  104  |  31<H>  ----------------------------<  81  4.3   |  24  |  1.6<H>    Ca    8.6      16 Jul 2024 06:57  Mg     1.8     07-15    TPro  6.7  /  Alb  3.9  /  TBili  0.6  /  DBili  x   /  AST  31  /  ALT  15  /  AlkPhos  80  07-15                          11.1   6.70  )-----------( 143      ( 16 Jul 2024 06:57 )             33.0     LIVER FUNCTIONS - ( 15 Jul 2024 07:50 )  Alb: 3.9 g/dL / Pro: 6.7 g/dL / ALK PHOS: 80 U/L / ALT: 15 U/L / AST: 31 U/L / GGT: x               IMAGING:    < from: Xray Chest 1 View- PORTABLE-Urgent (07.15.24 @ 07:46) >  ACC: [id number] EXAM:  XR CHEST PORTABLE URGENT 1V   ORDERED BY: FIORDALIZA PUENTES     PROCEDURE DATE:  07/15/2024          INTERPRETATION:  Clinical History / Reason for exam: Weakness    Comparison : Chest radiograph 3/24/2024.    Technique/Positioning: Frontal chest radiograph.    Findings/  impression:    Extensive calcified bilateral pleural plaque limits evaluation for   underlying opacities by plain film. No definite pneumothorax.   Atherosclerotic aorta. Osseous degenerative changes.    --- End of Report ---            LISETTE ZARCO MD; Attending Radiologist  This document has been electronically signed. Jul 15 2024  8:46AM    < end of copied text >

## 2024-07-16 NOTE — CONSULT NOTE ADULT - ASSESSMENT
89yMale pmh GERD, gait disorder, osteoarthritis, and BPH presents for weakness. Patient reports dysphagia for years, he had spaghetti one year ago and choked on it and spit it up. Patient reports similar thing happened yesterday and he spit it up and he is fine. Patient is able to eat currently has no globus sensation or dysphagia    #Dysphagia   ddx-r/o esophageal stricture, PUD, esophagitis  no symptoms now  Rec  -Diet as tolerated  -Outpatient EGD if benefits outweigh risks   -The benefits of endoscopy as well as the risks, such as GI bleeding, aspiration pneumonia, perforation, damage or loss of teeth, reaction to medication, or death  were reviewed with the patient.   -Finish cardiac workup  -Follow up with our [facility] located at [address] [place] [address]. Phone Number: [phone]    #Extensive calcified bilateral pleural plaque limits evaluation for   underlying opacities by plain film. No definite pneumothorax.   Atherosclerotic aorta. Osseous degenerative changes.  Rec  - Care as per primary team     Recall GI if needed  89yMale pmh GERD, gait disorder, osteoarthritis, and BPH presents for weakness. Patient reports dysphagia for years, he had spaghetti one year ago and choked on it and spit it up. Patient reports similar thing happened yesterday and he spit it up and he is fine. Patient is able to eat currently has no globus sensation or dysphagia    #Dysphagia   ddx-r/o esophageal stricture, PUD, esophagitis  no symptoms now  Rec  -Diet as tolerated  -Outpatient EGD if benefits outweigh risks   -The benefits of endoscopy as well as the risks, such as GI bleeding, aspiration pneumonia, perforation, damage or loss of teeth, reaction to medication, or death  were reviewed with the patient.   -Finish cardiac workup  -Follow up with our [facility] located at [address] [place] [address]. Phone Number: [phone]    #Extensive calcified bilateral pleural plaque limits evaluation for   underlying opacities by plain film. No definite pneumothorax.   Atherosclerotic aorta. Osseous degenerative changes.  Rec  - Care as per primary team      89yMale pmh GERD, gait disorder, osteoarthritis, and BPH presents for weakness. Patient reports dysphagia for years, he had spaghetti one year ago and choked on it and spit it up. Patient reports similar thing happened yesterday and he spit it up and he is fine. Patient is able to eat currently has no globus sensation or dysphagia. patient reports he ate really quickly    #Dysphagia patient reports it only happened when he ate really quickly   ddx-r/o esophageal stricture, PUD, esophagitis  no symptoms now  Rec  -Diet as tolerated  -if symptoms persist obtain barium esophagram   -Outpatient EGD if benefits outweigh risks   -The benefits of endoscopy as well as the risks, such as GI bleeding, aspiration pneumonia, perforation, damage or loss of teeth, reaction to medication, or death  were reviewed with the patient.   -Finish cardiac workup  -Follow up with our [facility] located at [address] [place] [address]. Phone Number: [phone]    #Extensive calcified bilateral pleural plaque limits evaluation for   underlying opacities by plain film. No definite pneumothorax.   Atherosclerotic aorta. Osseous degenerative changes.  Rec  - Care as per primary team      89yMale pmh GERD, gait disorder, osteoarthritis, and BPH presents for weakness. Patient reports dysphagia for years, he had spaghetti one year ago and choked on it and spit it up. Patient reports similar thing happened yesterday and he spit it up and he is fine. Patient is able to eat currently has no globus sensation or dysphagia. patient reports he ate really quickly    #Dysphagia patient reports it only happened when he ate really quickly   ddx-r/o esophageal stricture, PUD, esophagitis  no symptoms now  Rec  -Diet as tolerated  -if symptoms persist obtain barium esophagram   -Consider outpatient EGD if benefits outweigh risks   -The benefits of endoscopy as well as the risks, such as GI bleeding, aspiration pneumonia, perforation, damage or loss of teeth, reaction to medication, or death  were reviewed with the patient.   -Finish cardiac workup  -Follow up with our [facility] located at [address] [place] [address]. Phone Number: [phone]    #Extensive calcified bilateral pleural plaque limits evaluation for   underlying opacities by plain film. No definite pneumothorax.   Atherosclerotic aorta. Osseous degenerative changes.  Rec  - Care as per primary team

## 2024-07-16 NOTE — CONSULT NOTE ADULT - ASSESSMENT
HPI:  89-year-old male with a past medical history of Hypothyroidism, GERD, gait disorder, Osteoarthritis and BPH.  He presents with general weakness.  Reports general weakness started a few days ago and today symptom became more severe to the point where he had to lower himself down to the floor in his bathroom because of this weakness.  He could not get up off the floor so his wife called the ambulance.  - there was no loss of consciousness or chest pain     The patient also reports he feels like food is getting " Stuck " occasionally in his mid chest area: likely the esophagus. as per Dr Israel : patient has a known Hx of esophageal stricture (15 Jul 2024 13:22)    Cardiology:   Patient evaluated at bedside.  No c/o chest pain, shortness of breath, or palpitations.  Patient does not follow or have a cardiologist as outpatient-     Plan:   *Repeat TTE  *Previous TTE from March 2024: See above  *Weakness R/O infection

## 2024-07-16 NOTE — PHYSICAL THERAPY INITIAL EVALUATION ADULT - PERTINENT HX OF CURRENT PROBLEM, REHAB EVAL
Pt is a 89yo male admitted with diagnosis of weakness. Pt presented to the ED with general weakness that progressively got worse. Pt was in the bathroom when weakness progressed to a point that he had to lower himself onto the floor. Pt's wife had to call an ambulance as pt was unable to get up off the floor by himself; denies loss of consciousness. Possible cardiac event, cardiology consult pending.

## 2024-07-16 NOTE — CONSULT NOTE ADULT - SUBJECTIVE AND OBJECTIVE BOX
Outpt cardiologist: Patient does not have a cardiologist outpatient    HPI:  89-year-old male with a past medical history of Hypothyroidism, GERD, gait disorder, Osteoarthritis and BPH.  He presents with general weakness.  Reports general weakness started a few days ago and today symptom became more severe to the point where he had to lower himself down to the floor in his bathroom because of this  weakness.  He could not get up off the floor so his wife called the ambulance.  - there was no loss of consciousness or chest pain     The patient also reports he feels like food is getting " Stuck " occasionally in his mid chest area: likely the esophagus. as per Dr Israel : patient has a known Hx of esophageal stricture (15 Jul 2024 13:22)      PAST MEDICAL & SURGICAL HISTORY  Hypothyroidism    Acid reflux    Osteoarthritis    History of BPH    Vitiligo    S/P cholecystectomy      FAMILY HISTORY:  FAMILY HISTORY:      SOCIAL HISTORY:  Social History:  Tobacco use:  x 30 yrs, 1 PPD, stopped   EtOH use: No  Illicit drug use: No  Marital Status:   Ambulated with a cane (15 Jul 2024 13:22)      ALLERGIES:  No Known Allergies      MEDICATIONS:  heparin   Injectable 5000 Unit(s) SubCutaneous every 8 hours  levothyroxine 75 MICROGram(s) Oral daily  pantoprazole    Tablet 40 milliGRAM(s) Oral before breakfast  sodium chloride 0.9%. 1000 milliLiter(s) (50 mL/Hr) IV Continuous <Continuous>  tamsulosin 0.4 milliGRAM(s) Oral at bedtime    PRN:      HOME MEDICATIONS:  Home Medications:  Flomax 0.4 mg oral capsule: 1 cap(s) orally once a day (at bedtime) (15 Jul 2024 14:04)  levothyroxine 75 mcg (0.075 mg) oral tablet: 1 tab(s) orally once a day (15 Jul 2024 14:04)  Protonix 40 mg oral delayed release tablet: 1 tab(s) orally once a day (15 Jul 2024 14:04)      VITALS:   T(F): 97.7 ( @ 05:02), Max: 99.1 (07-15 @ 20:23)  HR: 69 ( @ 05:02) (67 - 87)  BP: 115/58 ( @ 05:02) (114/58 - 120/54)  BP(mean): --  RR: 16 (07-16 @ 05:02) (16 - 20)  SpO2: 95% ( @ 05:02) (95% - 98%)    I&O's Summary      REVIEW OF SYSTEMS:  CONSTITUTIONAL: No weakness, fevers or chills  HEENT: No visual changes, neck/ear pain  RESPIRATORY: No cough, sob  CARDIOVASCULAR: See HPI  GASTROINTESTINAL: No abdominal pain. No nausea, vomiting, diarrhea   GENITOURINARY: No dysuria, frequency or hematuria  NEUROLOGICAL: No new focal deficits  SKIN: No new rashes    PHYSICAL EXAM:  General: Not in distress.  Non-toxic appearing.   HEENT: EOMI  Cardio: regular, S1, S2, (+) murmur  Pulm: B/L BS.  No wheezing / crackles / rales  Abdomen: Soft, non-tender, non-distended. Normoactive bowel sounds  Extremities: No edema b/l le  Neuro: A&O x3. No focal deficits    LABS:                        11.1   6.70  )-----------( 143      ( 2024 06:57 )             33.0     07-    138  |  104  |  31<H>  ----------------------------<  81  4.3   |  24  |  1.6<H>    Ca    8.6      2024 06:57  Mg     1.8     07-15    TPro  6.7  /  Alb  3.9  /  TBili  0.6  /  DBili  x   /  AST  31  /  ALT  15  /  AlkPhos  80  07-15      Troponin trend: 36; 37      RADIOLOGY:  -CXR: < from: Xray Chest 1 View- PORTABLE-Urgent (07.15.24 @ 07:46) >  INTERPRETATION:  Clinical History / Reason for exam: Weakness    Comparison : Chest radiograph 3/24/2024.    Technique/Positioning: Frontal chest radiograph.    Findings/  impression:    Extensive calcified bilateral pleural plaque limits evaluation for   underlying opacities by plain film. No definite pneumothorax.   Atherosclerotic aorta. Osseous degenerative changes.    --- End of Report ---    < end of copied text >    -TTE: < from: TTE Echo Complete w/o Contrast w/ Doppler (24 @ 12:52) >    Summary:   1. Normal global left ventricular systolic function.   2. LV Ejection Fraction by Kincaid's Method with a biplane EF of 63 %.   3. The left ventricular diastolic function could not be assessed in this   study.   4. Normal right ventricular size and function.   5. Normal left atrial size.   6. Normal right atrial size.   7. Mild to moderate aortic valve stenosis.   8. Mild aortic regurgitation.   9. Moderate to severe mitral annular calcification.  10. Moderate thickening and calcification of the anterior and posterior   mitral valve leaflets.  11. Mitral valve mean gradient is 5.0 mmHg consistent with mild mitral   stenosis.  12. Moderate mitral valve regurgitation.  13. Moderate tricuspid regurgitation.  14. Mild pulmonic valve regurgitation.  15. Dilatation of the aortic root and ascending aorta, measuring 3.7 cm   each.  16. Estimated pulmonary artery systolic pressure is 42.4 mmHg assuming a   right atrial pressure of 3 mmHg, which is consistent with mild pulmonary   hypertension.  17. There is no evidence of pericardial effusion.  18. Compared to prior TTE on 10/21/22, there is now mild-moderate AS with   mild MS. Also mild pHTN.      < end of copied text >    EC Lead ECG:   Ventricular Rate 73 BPM    Atrial Rate 73 BPM    P-R Interval 196 ms    QRS Duration 98 ms    Q-T Interval 398 ms    QTC Calculation(Bazett) 438 ms    P Axis 59 degrees    R Axis 43 degrees    T Axis 38 degrees    Diagnosis Line Normal sinus rhythm  Normal ECG    Confirmed by LADARIUS SADLER, MICHELLE ([id number]) on 7/15/2024 12:04:26 PM (07-15 @ 11:24)      TELEMETRY EVENTS: N/A

## 2024-07-16 NOTE — PHYSICAL THERAPY INITIAL EVALUATION ADULT - GENERAL OBSERVATIONS, REHAB EVAL
9:15-9:40 Chart reviewed. Pt encountered supine in bed, may be seen by Physical Therapist as confirmed with Nurse. + IV Lock RUE, tele, bed alarm.

## 2024-07-16 NOTE — CONSULT NOTE ADULT - NS ATTEND AMEND GEN_ALL_CORE FT
Patient seen and examined. Pertinent labs, imaging and telemetry reviewed. I agree with the above:     Patient feeling better. STill sleepy. He had complained of feeling weak.   -Denies CP, SOB, palpitations, fevers, chills.   -Trop minimally elevated in setting of CKD, likely due to demand from underlying medical problems.   -EKG nonischemic and patient without symptoms.   -Repeat TTE showing normal LVEF and stable valvulopathies.   -Continued work up for fatigue.   -Does not appear to be cardiac in nature.
89yo male with intermittent dysphagia, currently tolerating diet. Consider esophagram to further evaluate. If symptoms recur/persist and pending cardiology evaluation would consider EGD.

## 2024-07-17 ENCOUNTER — TRANSCRIPTION ENCOUNTER (OUTPATIENT)
Age: 89
End: 2024-07-17

## 2024-07-17 VITALS
SYSTOLIC BLOOD PRESSURE: 148 MMHG | TEMPERATURE: 98 F | DIASTOLIC BLOOD PRESSURE: 66 MMHG | OXYGEN SATURATION: 98 % | HEART RATE: 79 BPM | RESPIRATION RATE: 17 BRPM

## 2024-07-17 LAB
ANION GAP SERPL CALC-SCNC: 9 MMOL/L — SIGNIFICANT CHANGE UP (ref 7–14)
BUN SERPL-MCNC: 27 MG/DL — HIGH (ref 10–20)
CALCIUM SERPL-MCNC: 8.6 MG/DL — SIGNIFICANT CHANGE UP (ref 8.4–10.5)
CHLORIDE SERPL-SCNC: 104 MMOL/L — SIGNIFICANT CHANGE UP (ref 98–110)
CO2 SERPL-SCNC: 24 MMOL/L — SIGNIFICANT CHANGE UP (ref 17–32)
CREAT SERPL-MCNC: 1.6 MG/DL — HIGH (ref 0.7–1.5)
CULTURE RESULTS: SIGNIFICANT CHANGE UP
EGFR: 40 ML/MIN/1.73M2 — LOW
GLUCOSE SERPL-MCNC: 91 MG/DL — SIGNIFICANT CHANGE UP (ref 70–99)
HCT VFR BLD CALC: 34.7 % — LOW (ref 42–52)
HGB BLD-MCNC: 11.9 G/DL — LOW (ref 14–18)
MAGNESIUM SERPL-MCNC: 1.9 MG/DL — SIGNIFICANT CHANGE UP (ref 1.8–2.4)
MCHC RBC-ENTMCNC: 30.5 PG — SIGNIFICANT CHANGE UP (ref 27–31)
MCHC RBC-ENTMCNC: 34.3 G/DL — SIGNIFICANT CHANGE UP (ref 32–37)
MCV RBC AUTO: 89 FL — SIGNIFICANT CHANGE UP (ref 80–94)
NRBC # BLD: 0 /100 WBCS — SIGNIFICANT CHANGE UP (ref 0–0)
PLATELET # BLD AUTO: 144 K/UL — SIGNIFICANT CHANGE UP (ref 130–400)
PMV BLD: 11 FL — HIGH (ref 7.4–10.4)
POTASSIUM SERPL-MCNC: 4.2 MMOL/L — SIGNIFICANT CHANGE UP (ref 3.5–5)
POTASSIUM SERPL-SCNC: 4.2 MMOL/L — SIGNIFICANT CHANGE UP (ref 3.5–5)
RBC # BLD: 3.9 M/UL — LOW (ref 4.7–6.1)
RBC # FLD: 14.3 % — SIGNIFICANT CHANGE UP (ref 11.5–14.5)
SODIUM SERPL-SCNC: 137 MMOL/L — SIGNIFICANT CHANGE UP (ref 135–146)
SPECIMEN SOURCE: SIGNIFICANT CHANGE UP
WBC # BLD: 7.9 K/UL — SIGNIFICANT CHANGE UP (ref 4.8–10.8)
WBC # FLD AUTO: 7.9 K/UL — SIGNIFICANT CHANGE UP (ref 4.8–10.8)

## 2024-07-17 PROCEDURE — 99232 SBSQ HOSP IP/OBS MODERATE 35: CPT

## 2024-07-17 RX ORDER — MIRABEGRON 50 MG/1
1 TABLET, EXTENDED RELEASE ORAL
Refills: 0 | DISCHARGE

## 2024-07-17 RX ADMIN — HEPARIN SODIUM 5000 UNIT(S): 50 INJECTION, SOLUTION INTRAVENOUS at 05:19

## 2024-07-17 RX ADMIN — HEPARIN SODIUM 5000 UNIT(S): 50 INJECTION, SOLUTION INTRAVENOUS at 13:52

## 2024-07-17 RX ADMIN — PANTOPRAZOLE SODIUM 40 MILLIGRAM(S): 40 INJECTION, POWDER, FOR SOLUTION INTRAVENOUS at 05:19

## 2024-07-17 RX ADMIN — DEXTROSE MONOHYDRATE AND SODIUM CHLORIDE 75 MILLILITER(S): 5; .3 INJECTION, SOLUTION INTRAVENOUS at 08:37

## 2024-07-17 RX ADMIN — POLYETHYLENE GLYCOL 3350 17 GRAM(S): 1 POWDER ORAL at 13:52

## 2024-07-17 RX ADMIN — Medication 75 MICROGRAM(S): at 05:19

## 2024-07-17 NOTE — DISCHARGE NOTE NURSING/CASE MANAGEMENT/SOCIAL WORK - PATIENT PORTAL LINK FT
You can access the FollowMyHealth Patient Portal offered by [facility] by registering at the following website: [email or web address]. By joining [facility]’s FollowMyHealth portal, you will also be able to view your health information using other applications (apps) compatible with our system.

## 2024-07-17 NOTE — DISCHARGE NOTE PROVIDER - NSDCCPCAREPLAN_GEN_ALL_CORE_FT
PRINCIPAL DISCHARGE DIAGNOSIS  Diagnosis: Weakness  Assessment and Plan of Treatment: resolved, you will go to outpatient Physical therapy      SECONDARY DISCHARGE DIAGNOSES  Diagnosis: EUGENE (acute kidney injury)  Assessment and Plan of Treatment: Resolved with IV hydration

## 2024-07-17 NOTE — PROGRESS NOTE ADULT - NS ATTEND AMEND GEN_ALL_CORE FT
Pt tolerating diet, currently denies dysphagia. Can consider esophagram and possible EGD if symptoms recur.

## 2024-07-17 NOTE — DISCHARGE NOTE PROVIDER - NSDCMRMEDTOKEN_GEN_ALL_CORE_FT
Flomax 0.4 mg oral capsule: 1 cap(s) orally once a day (at bedtime)  levothyroxine 75 mcg (0.075 mg) oral tablet: 1 tab(s) orally once a day  Myrbetriq 25 mg oral tablet, extended release: 1 tab(s) orally once a day  Protonix 40 mg oral delayed release tablet: 1 tab(s) orally once a day

## 2024-07-17 NOTE — DISCHARGE NOTE PROVIDER - CARE PROVIDER_API CALL
Arielle Israel  Internal Medicine  [address] [place] [id number]  Phone: [phone]  Fax: [phone]  Follow Up Time: 1 week

## 2024-07-17 NOTE — CHART NOTE - NSCHARTNOTEFT_GEN_A_CORE
11.9   7.90  )-----------( 144      ( 2024 07:04 )             34.7           137  |  104  |  27<H>  ----------------------------<  91  4.2   |  24  |  1.6<H>    Ca    8.6      2024 07:04  Mg     1.9         # EUGENE/CKD:  Cr improvin.8  to 1.6  ,  baseline Cr 1.4                 continue IVF hydration                 repeat labs for AM    # Weakness:     seen by PT who wrote Rehab  vs Home     willl discuss this with     Call placed to Dr Israel to discuss case and need for any further Telemetry

## 2024-07-17 NOTE — PROGRESS NOTE ADULT - TIME BILLING
Coordination of care
patient seen and examined consults reviewed and labs reviewed  case discussed with patient, his wife and house staff
patient seen and examined and case reviewed with house staff and labs and history and physical reviewed with PA and patient consults requested Wife made aware

## 2024-07-17 NOTE — DISCHARGE NOTE PROVIDER - HOSPITAL COURSE
HPI:  89-year-old male with a past medical history of Hypothyroidism, GERD, gait disorder, Osteoarthritis and BPH.  He presents with general weakness.  Reports general weakness started a few days ago and today symptom became more severe to the point where he had to lower himself down to the floor in his bathroom because of this  weakness.  He could not get up off the floor so his wife  called the ambulance.  - there was no loss of consciousness or chest pain     The patient also reports he feels like food is getting " Stuck " occasionally in his mid chest area: likely the esophagus. as per Dr Israel : patient has a known Hx of esophageal stricture      89 yo male with weakness possibly due to viral syndrome now developed diarrhea   seen by cardiology does not feel any acute cardiac event occurring    #1  Weakness  - seen by Physical Therapy and will receive home PT upon discharge      #2 Esophageal Stricture:  Seen by GI for esophageal stricture   patient is tolerating soft foods and risk of EGD high in this patient    #3 EUGENE  continue IV fluids   creatinine improved with IVF      continue supportive care  rehab consult      HPI:  89-year-old male with a past medical history of Hypothyroidism, GERD, gait disorder, Osteoarthritis and BPH.  He presents with general weakness.  Reports general weakness started a few days ago and today symptom became more severe to the point where he had to lower himself down to the floor in his bathroom because of this  weakness.  He could not get up off the floor so his wife  called the ambulance.  - there was no loss of consciousness or chest pain     The patient also reports he feels like food is getting " Stuck " occasionally in his mid chest area: likely the esophagus. as per Dr Israel : patient has a known Hx of esophageal stricture      89 yo male with weakness possibly due to viral syndrome now developed diarrhea   seen by cardiology does not feel any acute cardiac event occurring    #1  Weakness  - seen by Physical Therapy and will receive home PT upon discharge      #2 Esophageal Stricture:  Seen by GI for esophageal stricture   patient is tolerating soft foods and risk of EGD high in this patient    #3 EUGENE  continue IV fluids   creatinine improved with IVF    # Diarrhea: now resolved      continue supportive care  rehab consulted

## 2024-07-17 NOTE — PROGRESS NOTE ADULT - ASSESSMENT
89yMale pmh GERD, gait disorder, osteoarthritis, and BPH presents for weakness. Patient reports dysphagia for years, he had spaghetti one year ago and choked on it and spit it up. Patient reports similar thing happened yesterday and he spit it up and he is fine. Patient is able to eat currently has no globus sensation or dysphagia. patient reports he ate really quickly    #Dysphagia patient reports it only happened when he ate really quickly--->resolved   ddx-r/o esophageal stricture, PUD, esophagitis  no symptoms now  Rec  -Diet as tolerated  -if symptoms persist obtain barium esophagram   -Consider outpatient EGD if benefits outweigh risks   -The benefits of endoscopy as well as the risks, such as GI bleeding, aspiration pneumonia, perforation, damage or loss of teeth, reaction to medication, or death  were reviewed with the patient.   -Follow up with our [facility] located at [address] [place] [address]. Phone Number: [phone]    #Extensive calcified bilateral pleural plaque limits evaluation for   underlying opacities by plain film. No definite pneumothorax.   Atherosclerotic aorta. Osseous degenerative changes.  Rec  - Care as per primary team     Recall GI if needed   
89 yo male with weakness possibly due to viral syndrome now developed diarrhea   seen by cardiology does not feel any acute cardiac event occurring  Seen by GI for esophageal stricture   patient is tolerating soft foods and risk of EGD high in this patient  EUGENE  continue IV fluids   creatinine improved  monitor for potassium loss    continue supportive care  rehab consult 
89 yo with EUGENE and weakness with worsening esophageal stricture  EUGENE  IV 0.45 NS at 75cc hr  GI consult    possible cardiac event   echocardiogram   cardiology consult \  patient on telemetry    Weakness   further eval for rehab after above workup

## 2024-07-17 NOTE — PROGRESS NOTE ADULT - SUBJECTIVE AND OBJECTIVE BOX
89yMale  Being followed for dysphagia   Interval history: Patient denies nausea, vomiting, hematemesis, melena, blood in stool, diarrhea, constipation, abdominal pain. Patient tolerating diet without complaints.      PAST MEDICAL & SURGICAL HISTORY:  Hypothyroidism      Acid reflux      Osteoarthritis      History of BPH      Vitiligo      S/P cholecystectomy                Social History: No smoking. No alcohol. No illegal drug use.            MEDICATIONS  (STANDING):  heparin   Injectable 5000 Unit(s) SubCutaneous every 8 hours  levothyroxine 75 MICROGram(s) Oral daily  pantoprazole    Tablet 40 milliGRAM(s) Oral once  pantoprazole    Tablet 40 milliGRAM(s) Oral before breakfast  polyethylene glycol 3350 17 Gram(s) Oral daily  sodium chloride 0.45%. 1000 milliLiter(s) (75 mL/Hr) IV Continuous <Continuous>  tamsulosin 0.4 milliGRAM(s) Oral at bedtime    MEDICATIONS  (PRN):  acetaminophen     Tablet .. 650 milliGRAM(s) Oral every 6 hours PRN Temp greater or equal to 38C (100.4F), Mild Pain (1 - 3)  aluminum hydroxide/magnesium hydroxide/simethicone Suspension 30 milliLiter(s) Oral every 4 hours PRN Dyspepsia  ondansetron Injectable 4 milliGRAM(s) IV Push every 8 hours PRN Nausea and/or Vomiting      Allergies:  No Known Allergies            REVIEW OF SYSTEMS:  General:  No weight loss, fevers, or chills.  Eyes:  No reported pain or visual changes  ENT:  No sore throat or runny nose.  NECK: No stiffness   CV:  No chest pain or palpitations.  Resp:  No shortness of breath, cough  GI:  No abdominal pain, nausea, vomiting, dysphagia, diarrhea or constipation. No rectal bleeding, melena, or hematemesis.  Neuro:  No tingling, numbness           VITAL SIGNS:   T(F): 97.3 (07-17-24 @ 05:05), Max: 98.2 (07-16-24 @ 21:27)  HR: 65 (07-17-24 @ 05:05) (65 - 72)  BP: 133/61 (07-17-24 @ 05:05) (99/59 - 133/61)  RR: 18 (07-17-24 @ 05:05) (18 - 18)  SpO2: 97% (07-17-24 @ 05:05) (95% - 98%)    PHYSICAL EXAM:  GENERAL: AAOx3, no acute distress.  HEAD:  Atraumatic, Normocephalic  EYES: conjunctiva and sclera clear  NECK: Supple, no JVD or thyromegaly  CHEST/LUNG: Clear to auscultation bilaterally; No wheeze, rhonchi, or rales  HEART: Regular rate and rhythm; normal S1, S2, No murmurs.  ABDOMEN: Soft, nontender, nondistended; Bowel sounds present  NEUROLOGY: No asterixis or tremor.   SKIN: Intact, no jaundice            LABS:                        11.9   7.90  )-----------( 144      ( 17 Jul 2024 07:04 )             34.7     07-17    137  |  104  |  27<H>  ----------------------------<  91  4.2   |  24  |  1.6<H>    Ca    8.6      17 Jul 2024 07:04  Mg     1.9     07-17            IMAGING:    < from: Xray Chest 1 View- PORTABLE-Urgent (07.15.24 @ 07:46) >    ACC: [id number] EXAM:  XR CHEST PORTABLE URGENT 1V   ORDERED BY: FIORDALIZA PUENTES     PROCEDURE DATE:  07/15/2024          INTERPRETATION:  Clinical History / Reason for exam: Weakness    Comparison : Chest radiograph 3/24/2024.    Technique/Positioning: Frontal chest radiograph.    Findings/  impression:    Extensive calcified bilateral pleural plaque limits evaluation for   underlying opacities by plain film. No definite pneumothorax.   Atherosclerotic aorta. Osseous degenerative changes.    --- End of Report ---            LISETTE ZARCO MD; Attending Radiologist  This document has been electronically signed. Jul 15 2024  8:46AM    < end of copied text >        
89 yo male hx esophageal stricture, spinal stenosis osteo arthritis who developed weakness and eased himself to the floor in the bathroom. Patient has a long history of esophageal stricture had a partial work up with GI with an outcome uncertain for malignancy Pt has advanced age and chose to treat symptomatically, feels the stricture has been worse last week. Patient agrees to a GI consult He has felt weak and drinking and eating less recently Patient on telemetry to eval for possible cardiac event                          12.7   10.12 )-----------( 144      ( 15 Jul 2024 07:50 )             37.7   07-15    136  |  101  |  34<H>  ----------------------------<  122<H>  4.8   |  26  |  1.8<H>    Ca    9.5      15 Jul 2024 07:50  Mg     1.8     -15    TPro  6.7  /  Alb  3.9  /  TBili  0.6  /  DBili  x   /  AST  31  /  ALT  15  /  AlkPhos  80  -15  Urinalysis Basic - ( 15 Jul 2024 14:13 )    Color: Yellow / Appearance: Clear / S.015 / pH: x  Gluc: x / Ketone: Negative mg/dL  / Bili: Negative / Urobili: 0.2 mg/dL   Blood: x / Protein: Trace mg/dL / Nitrite: Negative   Leuk Esterase: Negative / RBC: x / WBC x   Sq Epi: x / Non Sq Epi: x / Bacteria: x    
89 yo male complaining of weakness and dysphagia now with diarrhea t max 99.1 seen by GI and Cardiology                          11.1   6.70  )-----------( 143      ( 16 Jul 2024 06:57 )             33.0   07-16    138  |  104  |  31<H>  ----------------------------<  81  4.3   |  24  |  1.6<H>    Ca    8.6      16 Jul 2024 06:57  Mg     1.8     07-15    TPro  6.7  /  Alb  3.9  /  TBili  0.6  /  DBili  x   /  AST  31  /  ALT  15  /  AlkPhos  80  07-15    Echocardiogram mild to mod MS and AS EF normal pHTN    CXR calcifications 3.7 aortic dilitation   no infiltrate    Vital Signs Last 24 Hrs  T(C): 36.3 (17 Jul 2024 05:05), Max: 36.8 (16 Jul 2024 21:27)  T(F): 97.3 (17 Jul 2024 05:05), Max: 98.2 (16 Jul 2024 21:27)  HR: 65 (17 Jul 2024 05:05) (65 - 72)  BP: 133/61 (17 Jul 2024 05:05) (99/59 - 133/61)  BP(mean): --  RR: 18 (17 Jul 2024 05:05) (18 - 18)  SpO2: 97% (17 Jul 2024 05:05) (95% - 98%)    Parameters below as of 16 Jul 2024 21:27  Patient On (Oxygen Delivery Method): room air

## 2024-07-24 DIAGNOSIS — K22.2 ESOPHAGEAL OBSTRUCTION: ICD-10-CM

## 2024-07-24 DIAGNOSIS — R53.1 WEAKNESS: ICD-10-CM

## 2024-07-24 DIAGNOSIS — N40.0 BENIGN PROSTATIC HYPERPLASIA WITHOUT LOWER URINARY TRACT SYMPTOMS: ICD-10-CM

## 2024-07-24 DIAGNOSIS — N17.9 ACUTE KIDNEY FAILURE, UNSPECIFIED: ICD-10-CM

## 2024-07-24 DIAGNOSIS — R13.10 DYSPHAGIA, UNSPECIFIED: ICD-10-CM

## 2024-07-24 DIAGNOSIS — E03.9 HYPOTHYROIDISM, UNSPECIFIED: ICD-10-CM

## 2024-07-24 DIAGNOSIS — M19.90 UNSPECIFIED OSTEOARTHRITIS, UNSPECIFIED SITE: ICD-10-CM

## 2024-09-20 NOTE — PATIENT PROFILE ADULT - LAST TOBACCO USE (DD-MM-YY)
Name from pharmacy: LISINOPRIL 5 MG TAB 5 Tablet         Will file in chart as: lisinopril (ZESTRIL) 5 MG tablet    Sig: TAKE 1 TABLET BY MOUTH DAILY.    Disp: 30 tablet    Refills: 1    Start: 9/20/2024    Class: Eprescribe    Last ordered: 2 months ago (7/11/2024) by Alexis Dooley MD    Last refill: 8/21/2024    Rx #: [id number]    ACE Inhibitor Refill Protocol - 12 Month Protocol [id number]/20/2024 10:54 AM   Protocol Details eGFR resulted within last 12 months -- IF CRITERIA FAILED REFER TO PROTOCOL DETAILS    Seen by prescribing provider or same department within the last 12 months or has a future appt in 3 months - IF FAILED PLEASE LOOK AT CHART REVIEW FOR LAST VISIT AND PROCEED ACCORDINGLY    Normal Potassium within last 12 months looking at last value -- IF CRITERIA FAILED REFER TO PROTOCOL DETAILS    Medication (including dose and sig) on current meds list    Current med list does not contain more than one ACE Inhibitor medication    Last BP was equal to or less than 150/100 -- IF CRITERIA FAILED REFER TO PROTOCOL DETAILS    eGFR greater than 10 resulted within last 12 months looking at last value -- IF CRITERIA FAILED REFER TO PROTOCOL DETAILS      To be filled at: [facility] [place] [address] [id number]        [phone]

## 2024-11-16 NOTE — PHYSICAL THERAPY INITIAL EVALUATION ADULT - CRITERIA FOR SKILLED THERAPEUTIC INTERVENTIONS
impairments found/functional limitations in following categories/rehab potential/therapy frequency/predicted duration of therapy intervention/anticipated equipment needs at discharge/anticipated discharge recommendation 16-Nov-2024 22:24

## 2024-11-19 NOTE — H&P ADULT - TIME-BASED BILLING (NON-CRITICAL CARE)
Received fax from [employer]'s about request for diltiazem rx.   LOV 9/24/24  Next OV 4/8/25  Rx sent.    Time-based billing (NON-critical care)

## 2024-11-27 ENCOUNTER — EMERGENCY (EMERGENCY)
Facility: HOSPITAL | Age: 89
LOS: 0 days | Discharge: HOME | End: 2024-11-27
Attending: EMERGENCY MEDICINE
Payer: MEDICARE

## 2024-11-27 VITALS
WEIGHT: 139.99 LBS | HEART RATE: 70 BPM | RESPIRATION RATE: 16 BRPM | SYSTOLIC BLOOD PRESSURE: 128 MMHG | TEMPERATURE: 98 F | DIASTOLIC BLOOD PRESSURE: 67 MMHG | OXYGEN SATURATION: 98 %

## 2024-11-27 DIAGNOSIS — K59.00 CONSTIPATION, UNSPECIFIED: ICD-10-CM

## 2024-11-27 DIAGNOSIS — E03.9 HYPOTHYROIDISM, UNSPECIFIED: ICD-10-CM

## 2024-11-27 DIAGNOSIS — Z90.49 ACQUIRED ABSENCE OF OTHER SPECIFIED PARTS OF DIGESTIVE TRACT: Chronic | ICD-10-CM

## 2024-11-27 DIAGNOSIS — K21.9 GASTRO-ESOPHAGEAL REFLUX DISEASE WITHOUT ESOPHAGITIS: ICD-10-CM

## 2024-11-27 DIAGNOSIS — N40.0 BENIGN PROSTATIC HYPERPLASIA WITHOUT LOWER URINARY TRACT SYMPTOMS: ICD-10-CM

## 2024-11-27 DIAGNOSIS — M19.90 UNSPECIFIED OSTEOARTHRITIS, UNSPECIFIED SITE: ICD-10-CM

## 2024-11-27 PROBLEM — Z87.438 PERSONAL HISTORY OF OTHER DISEASES OF MALE GENITAL ORGANS: Chronic | Status: ACTIVE | Noted: 2024-07-15

## 2024-11-27 PROBLEM — L80 VITILIGO: Chronic | Status: ACTIVE | Noted: 2024-07-15

## 2024-11-27 PROCEDURE — 99283 EMERGENCY DEPT VISIT LOW MDM: CPT

## 2024-11-27 RX ORDER — SODIUM PHOSPHATE, DIBASIC AND SODIUM PHOSPHATE, MONOBASIC, UNSPECIFIED FORM 7; 19 G/118ML; G/118ML
1 ENEMA RECTAL ONCE
Refills: 0 | Status: DISCONTINUED | OUTPATIENT
Start: 2024-11-27 | End: 2024-11-27

## 2024-11-27 RX ORDER — MINERAL OIL 471.99 G/472ML
133 OIL TOPICAL ONCE
Refills: 0 | Status: COMPLETED | OUTPATIENT
Start: 2024-11-27 | End: 2024-11-27

## 2024-11-27 RX ADMIN — MINERAL OIL 133 MILLILITER(S): 471.99 OIL TOPICAL at 14:16

## 2024-11-27 NOTE — ED PROVIDER NOTE - CLINICAL SUMMARY MEDICAL DECISION MAKING FREE TEXT BOX
89 y.o. male PMH of Hypothyroidism, GERD, OA, BPH, presents with 1 day of constipation. tried ot manually disimpact. no fever, abd pain, n/v/d or recent GIB sxs. on exam, nontoxic, vs noted   abd soft, ND, NT  normal WOB  Pt improved and feels better after ED treatment.   In my opinion, based on current evaluation and results, an acute medical or surgical emergency does not appear to be occurring at this time and I feel that the pt is stable for further outpatient work up and/or treatment. Return precautions discussed.

## 2024-11-27 NOTE — ED PROVIDER NOTE - PHYSICAL EXAMINATION
Physical Exam    Vital Signs: I have reviewed the initial vital signs.  Constitutional: appears stated age, no acute distress  Cardiovascular: well-perfused extremities  Respiratory: unlabored respiratory effort  Gastrointestinal: soft, non-tender, non-distended abdomen  Skin: Warm, dry  Neurologic: awake, alert  Psychiatric: appropriate mood, appropriate affect

## 2024-11-27 NOTE — ED PROVIDER NOTE - NSFOLLOWUPINSTRUCTIONS_ED_ALL_ED_FT
Follow up with your Primary Care Provider in 1-3 days.    Constipation    WHAT YOU NEED TO KNOW:    Constipation is when you have hard, dry bowel movements, or you go longer than usual between bowel movements.     Call your doctor if:   •You have blood in your bowel movements.  •You have a fever and abdominal pain with the constipation.  •Your constipation gets worse.   •You start to vomit.  •You have questions or concerns about your condition or care.    Medicines:   •Medicine such as a laxative may help relax and loosen your intestines to help you have a bowel movement. Your provider may recommend you only use laxatives for a short time. Long-term use may make your bowels dependent on the medicine.    •Take your medicine as directed. Contact your healthcare provider if you think your medicine is not helping or if you have side effects. Tell him of her if you are allergic to any medicine. Keep a list of the medicines, vitamins, and herbs you take. Include the amounts, and when and why you take them. Bring the list or the pill bottles to follow-up visits. Carry your medicine list with you in case of an emergency.    Relieve constipation:   •A suppository may be used to help soften your bowel movements. This may make them easier to pass. A suppository is guided into your rectum through your anus.     •An enema is liquid medicine used to clear bowel movement from your rectum. The medicine is put into your rectum through your anus.    Prevent constipation:   •Drink liquids as directed. You may need to drink extra liquids to help soften and move your bowels. Ask how much liquid to drink each day and which liquids are best for you.     •Eat high-fiber foods. This may help decrease constipation by adding bulk to your bowel movements. High-fiber foods include fruit, vegetables, whole-grain breads and cereals, and beans. Your healthcare provider or dietitian can help you create a high-fiber meal plan. Your provider may also recommend a fiber supplement if you cannot get enough fiber from food.     •Exercise regularly. Regular physical activity can help stimulate your intestines. Walking is a good exercise to prevent or relieve constipation. Ask which exercises are best for you.     •Schedule a time each day to have a bowel movement. This may help train your body to have regular bowel movements. Bend forward while you are on the toilet to help move the bowel movement out. Sit on the toilet for at least 10 minutes, even if you do not have a bowel movement.     •Talk to your healthcare provider about your medicines. Certain medicines, such as opioids, can cause constipation. Your provider may be able to make medicine changes. For example, he or she may change the kind of medicine, or change when you take it.    Follow up with your healthcare provider as directed: Write down your questions so you remember to ask them during your visits.

## 2024-11-27 NOTE — ED PROVIDER NOTE - OBJECTIVE STATEMENT
89 y.o. male PMH of Hypothyroidism, GERD, OA, BPH, presenting to the ER for 1 day history of constipation. Pt reports the urge to defecate but has not been able to have BM. States he took metamucil and tried to manually disimpact himself with minimal relief. States he had normal BM last night. Denies any fevers, abdominal pain, N/V.

## 2024-11-27 NOTE — ED PROVIDER NOTE - PATIENT PORTAL LINK FT
You can access the FollowMyHealth Patient Portal offered by [facility] by registering at the following website: [email or web address]. By joining [employer]’s FollowMyHealth portal, you will also be able to view your health information using other applications (apps) compatible with our system.

## 2024-11-27 NOTE — ED PROVIDER NOTE - NSFOLLOWUPCLINICS_GEN_ALL_ED_FT
[facility]enter[facility] at [place]  Gastroenterology  [address] [place] [address]  Phone: [phone]  Fax: [phone]

## 2024-11-27 NOTE — ED PROVIDER NOTE - PROGRESS NOTE DETAILS
Patient reassessed after having BM. Pt reports improvement of symptoms. Informed to continue OTC therapies if constipation occurs and to return for any worsening symptoms, reports verbal understanding.

## 2024-11-27 NOTE — ED ADULT NURSE NOTE - NSFALLHARMRISKINTERV_ED_ALL_ED
Assistance OOB with selected safe patient handling equipment if applicable/Assistance with ambulation/Communicate risk of Fall with Harm to all staff, patient, and family/Encourage patient to sit up slowly, dangle for a short time, stand at bedside before walking/Monitor gait and stability/Monitor for mental status changes and reorient to person, place, and time, as needed/Move patient closer to nursing station/within visual sight of ED staff/Provide patient with walking aids/Provide visual cue: red socks, yellow wristband, yellow gown, etc/Reinforce activity limits and safety measures with patient and family/Review medications for side effects contributing to fall risk/Toileting schedule using arm’s reach rule for commode and bathroom/Use of alarms - bed, stretcher, chair and/or video monitoring/Bed in lowest position, wheels locked, appropriate side rails in place/Call bell, personal items and telephone in reach/Instruct patient to call for assistance before getting out of bed/chair/stretcher/Non-slip footwear applied when patient is off stretcher/[place] to call system/Physically safe environment - no spills, clutter or unnecessary equipment/Purposeful Proactive Rounding/Room/bathroom lighting operational, light cord in reach

## 2025-02-24 NOTE — ED PROVIDER NOTE - IV ALTEPLASE ADMIN OUTSIDE HIDDEN
Left detailed message to Maliha at [facility] out patient lab no UA order  This was already completed 02/06/25    Called patient to get more info  States she went to [facility] lab and showed them her pending order UA ? But this order cannot be seen by ACL  Informed her this was already completed 02/06, states she got confused about it  Was also told they have question about test you ordered 02/13 ?  Do you want her to have both pending test ordered 02/07 and 02/13 you ordered ? Per patient they also have question about Vit D Hydroxy?  You want both of this , your order said sent out ?  I told patient not to go to lab until I verify her message to Dr Yee, she agreed       Hi Jonelle,     I have placed additional testing for you to complete to workup the cause of your high calcium.  Please complete this at your earliest convenience.       Please reach out to me with any concerns.  Best wishes.  Elyse Yee MD      show

## 2025-03-14 NOTE — ED PROVIDER NOTE - HIV OFFER
Tests results reviewed    Cholesterol way better than before   Continue Mediterranean diet   Recommend to start low-dose statin as goal LDL is less than 70  Statins sent to the pharmacy  All the blood work results within normal limit     Previously Declined (within the last year)

## 2025-05-08 NOTE — ED ADULT TRIAGE NOTE - ACCOMPANIED BY
Chief Complaint   Patient presents with   • Finger     Do not know what happened to the left middle finger.  Looks infected as of today       HPI: 14yo male with hx autism spectrum d/o presents with redness and infection around nail on L middle finger.  Per dad, he is complaining of pain.  He does not chew or pick at his nails.  No meds given this am.      ROS: Remainder of ROS unremarkable except as marked in HPI    Past Medical History:   Diagnosis Date   • Autism (CMD)     intellectual delays   • Body mass index (BMI) greater than 95th percentile 08/07/2020   • Depression    • Frequent headaches 10/16/2019   • Hospital discharge follow-up 12/02/2022   • Innocent heart murmur 10/16/2019   • Obesity, pediatric, BMI greater than or equal to 95th percentile for age 12/02/2022       MEDICATIONS:  Current Outpatient Medications   Medication Sig   • cephalexin (KEFLEX) 500 MG capsule Take 1 capsule by mouth in the morning and 1 capsule at noon and 1 capsule in the evening. Do all this for 7 days.   • guanFACINE (INTUNIV) 2 MG TABLET SR 24 HR Take 2 mg by mouth daily.   • betamethasone valerate (VALISONE) 0.1 % cream Apply topically 2 times daily. X 4-8 weeks   • Omega-3 Fatty Acids (Super Omega-3) 1000 MG Cap Take 1,000 mg by mouth daily.   • Zinc Acetate, Oral, 25 MG Cap Take 12.5 mg by mouth daily.   • ketoconazole 1 % shampoo Apply topically every 3 days as needed for Dandruff. Apply to wet hair, lather, and rinse. Use every 3 days for up to 8 weeks.     No current facility-administered medications for this visit.       ALLERGIES:  ALLERGIES:   Allergen Reactions   • Lithium Other (See Comments)     confusion       PAST SURGICAL HISTORY:  No past surgical history on file.    FAMILY HISTORY:  Family History   Problem Relation Age of Onset   • Patient is unaware of any medical problems Mother    • Anemia Father    • Crohn's Disease Father    • Asthma Brother    • ADHD/ADD Brother    • Cancer Paternal Grandmother          neuroendocrine   • Heart disease Paternal Grandfather    • Diabetes Paternal Grandfather    • Hyperlipidemia Paternal Grandfather    • Hypertension Paternal Grandfather        SOCIAL HISTORY:  Social History     Tobacco Use   • Smoking status: Never   • Smokeless tobacco: Never   Substance Use Topics   • Alcohol use: Never   • Drug use: Never       PHYSICAL EXAM:  Visit Vitals  BP (!) 112/57   Pulse 75   Temp 99.2 °F (37.3 °C) (Temporal)   Resp 18   Wt 79.1 kg (174 lb 4.4 oz)   SpO2 99%     General: awake, alert, well nourished, well appearing   HEENT: normocephalic, sclerae white, pupils equal, round, and reactive to light, extraocular movements intact, normal external ears, tympanic membranes normal, nares clear, mucous membranes moist, oropharynx with no erythema, exudate or petiechiae  Neck: supple, no adenopathy or masses  Heart/CV: regular rate and rhythm, no murmur  Lungs/Chest: clear to auscultation bilaterally, no wheeze, no rales, no rhonchi, good expansion and aeration bilaterally  Abdomen/GI: soft, non-tender, non-distended, normoactive bowel sounds, no hepatosplenomegaly  Extr/Muscle: full range of motion of all extremities  : deferred  Skin: L middle finger with erythema and around nail and a crescentic area of purulence  Neuro: alert, interactive, normal tone, moves all extremities well      ASSESSMENT/ PLAN:  Procedure -   LMX applied. Finger cleaned with alcohol.  Scalpel used to make a small incision. Sig amout of green purulent material expressed.  Applied antibiotic ointment and band-aid.  Pt tolerated very well.    14yo with paronychia   - I and D. Keflex Rx x 7d  -Peroxide soaks for 2d  =Pain meds as needed    FOLLOW-UP:  If sx worsen or fail to improve.    Plan of care and anticipatory guidance discussed with patient/parents at bedside.  Parents displayed good understanding of plan of care.    Tiffanie Blackwell MD       Spouse/Significant other

## 2025-05-16 ENCOUNTER — EMERGENCY (EMERGENCY)
Facility: HOSPITAL | Age: 89
LOS: 0 days | Discharge: HOME | End: 2025-05-16
Attending: EMERGENCY MEDICINE
Payer: MEDICARE

## 2025-05-16 VITALS
RESPIRATION RATE: 20 BRPM | SYSTOLIC BLOOD PRESSURE: 123 MMHG | HEIGHT: 66 IN | OXYGEN SATURATION: 87 % | DIASTOLIC BLOOD PRESSURE: 72 MMHG | HEART RATE: 77 BPM | WEIGHT: 145.06 LBS

## 2025-05-16 VITALS — TEMPERATURE: 98 F

## 2025-05-16 DIAGNOSIS — T18.128A FOOD IN ESOPHAGUS CAUSING OTHER INJURY, INITIAL ENCOUNTER: ICD-10-CM

## 2025-05-16 DIAGNOSIS — K21.9 GASTRO-ESOPHAGEAL REFLUX DISEASE WITHOUT ESOPHAGITIS: ICD-10-CM

## 2025-05-16 DIAGNOSIS — E03.9 HYPOTHYROIDISM, UNSPECIFIED: ICD-10-CM

## 2025-05-16 DIAGNOSIS — N40.0 BENIGN PROSTATIC HYPERPLASIA WITHOUT LOWER URINARY TRACT SYMPTOMS: ICD-10-CM

## 2025-05-16 DIAGNOSIS — M19.90 UNSPECIFIED OSTEOARTHRITIS, UNSPECIFIED SITE: ICD-10-CM

## 2025-05-16 DIAGNOSIS — Z90.49 ACQUIRED ABSENCE OF OTHER SPECIFIED PARTS OF DIGESTIVE TRACT: Chronic | ICD-10-CM

## 2025-05-16 PROCEDURE — 99282 EMERGENCY DEPT VISIT SF MDM: CPT

## 2025-05-16 PROCEDURE — 99283 EMERGENCY DEPT VISIT LOW MDM: CPT

## 2025-05-16 NOTE — ED PROVIDER NOTE - NSFOLLOWUPINSTRUCTIONS_ED_ALL_ED_FT
Our Emergency Department Referral Coordinators will be reaching out to you in the next 24-48 hours from 9:00am to 5:00pm with a follow up appointment. Please expect a phone call from the hospital in that time frame. If you do not receive a call or if you have any questions or concerns, you can reach them at   [phone].

## 2025-05-16 NOTE — ED PROVIDER NOTE - ATTENDING APP SHARED VISIT CONTRIBUTION OF CARE
Pt with food stuck sensation in chest after eating sandwich, cake, and taking vitamin.  Was having trouble swallowing saliva.  En route with EMS, sensation resolved and now pt is tolerating secretions.  No nausea/vomiting.  No choking/SOB.    Exam: RRR, CTAB, soft NT abdomen, tolerating PO in ED  Plan: GI f/u

## 2025-05-16 NOTE — ED PROVIDER NOTE - PATIENT PORTAL LINK FT
You can access the FollowMyHealth Patient Portal offered by [facility] by registering at the following website: [email or web address]. By joining [employer]’s FollowMyHealth portal, you will also be able to view your health information using other applications (apps) compatible with our system.

## 2025-05-16 NOTE — ED PROVIDER NOTE - PHYSICAL EXAMINATION
CONST: Well appearing in NAD  ENT: No nasal discharge. Oropharynx normal appearing, no erythema or exudates. No abscess or swelling. Uvula midline.   NECK: Non-tender, no meningeal signs. normal ROM. supple   CARD: S1 S2; No jvd  RESP: Equal BS B/L, No wheezes, rhonchi or rales. No distress  GI: Soft, non-tender, non-distended. no cva tenderness. normal BS  SKIN: Warm, dry, no acute rashes. Good turgor

## 2025-05-16 NOTE — ED ADULT TRIAGE NOTE - CHIEF COMPLAINT QUOTE
pt biba for complaints of feeling like something is stuck in his throat   pt stated this has been going on for years, pt denies difficulty breating

## 2025-05-16 NOTE — ED PROVIDER NOTE - OBJECTIVE STATEMENT
89-year-old male past medical history hypothyroid, GERD, OA, BPH presents for evaluation.  Patient states after eating a sandwich he had a sensation of food being stuck in his throat leading him to call EMS.  While en route with EMS that sensation resolved and he has been able to tolerate p.o. since.  Denies fever, headache, chest pain, shortness of breath, abdominal pain, dysuria, hematuria, vomiting, diarrhea, dysphagia.

## 2025-05-16 NOTE — ED PROVIDER NOTE - CARE PROVIDER_API CALL
Phyllis Hernandez  Gastroenterology  [address] [place] [address]  Phone: [phone]  Fax: [phone]  Follow Up Time: Routine

## 2025-05-16 NOTE — ED ADULT NURSE NOTE - NSFALLHARMRISKINTERV_ED_ALL_ED

## 2025-05-20 NOTE — CHART NOTE - NSCHARTNOTEFT_GEN_A_CORE
[facility] MRN [id number] / Left message 5/19 & 5/20 - RIGO    SPECIALTY: gastroenterology

## 2025-07-28 NOTE — ED ADULT TRIAGE NOTE - RESPIRATORY RATE (BREATHS/MIN)
--DO NOT REPLY - Sent from PACT - If sent to wrong pool, reroute to P ECO Reroute pool --    Message Type:  Medication Question? Refill Request   Question Details:  Hello patient is needing  provider to reach ou to the pharmacy he has [facility] and needing to confirm medication is for medical purposes and patient will be needing refill soon. Patient is wanting to change medication for his Viagra from 30 day to 90 day. Patient is also wanting to do home delivery as well with medication.   Medication Name: sildenafil (VIAGRA) 50 MG tablet/ albuterol 108 (90 Base) MCG/ACT inhaler   Preferred pharmacy verified and selected. [facility] - [address].   Callback #:     [phone]     Can a detailed message be left? Yes - Voicemail   Caller has been advised this message will be addressed within:1 business day [high priority]Copied from CRM #[id number]. Topic: MW Medication/Rx - MW Patient Calling for RX Needs  >> Jul 28, 2025  2:12 PM Albert DASH wrote:  Popeye Lennon called with Question about medication  >ADDED NOTE / CREATED CUSTOM CLINICAL CALL  >Sent per KB for Clinical Support  
18

## 2025-07-30 ENCOUNTER — EMERGENCY (EMERGENCY)
Facility: HOSPITAL | Age: 89
LOS: 0 days | Discharge: HOME | End: 2025-07-30
Attending: STUDENT IN AN ORGANIZED HEALTH CARE EDUCATION/TRAINING PROGRAM
Payer: MEDICARE

## 2025-07-30 VITALS
RESPIRATION RATE: 16 BRPM | WEIGHT: 164.91 LBS | SYSTOLIC BLOOD PRESSURE: 155 MMHG | TEMPERATURE: 98 F | HEART RATE: 76 BPM | OXYGEN SATURATION: 99 % | DIASTOLIC BLOOD PRESSURE: 76 MMHG

## 2025-07-30 VITALS — HEART RATE: 82 BPM | DIASTOLIC BLOOD PRESSURE: 85 MMHG | SYSTOLIC BLOOD PRESSURE: 130 MMHG

## 2025-07-30 DIAGNOSIS — K59.00 CONSTIPATION, UNSPECIFIED: ICD-10-CM

## 2025-07-30 DIAGNOSIS — Z90.49 ACQUIRED ABSENCE OF OTHER SPECIFIED PARTS OF DIGESTIVE TRACT: Chronic | ICD-10-CM

## 2025-07-30 PROCEDURE — 99282 EMERGENCY DEPT VISIT SF MDM: CPT

## 2025-07-30 PROCEDURE — 99283 EMERGENCY DEPT VISIT LOW MDM: CPT

## 2025-07-30 RX ORDER — POLYETHYLENE GLYCOL 3350 17 G/17G
17 POWDER, FOR SOLUTION ORAL
Qty: 1 | Refills: 0
Start: 2025-07-30 | End: 2025-08-05

## 2025-07-30 NOTE — ED PROVIDER NOTE - ATTENDING APP SHARED VISIT CONTRIBUTION OF CARE
89-year-old male here for evaluation of constipation, patient Member last bowel movement states he has stool stuck at the rectum today causing discomfort which prompted visit.   CONSTITUTIONAL: Well developed; in no acute distress  HEAD: Normocephalic; atraumatic  EYES: No conjunctival injection, no scleral icterus  ENT:No nasal discharge; airway clear.  NECK: Supple; non tender.  CARD: Warm and well perfused, not tachycardic  RESP: Breathing comfortably on RA, speaking in full sentences w/o distress  Abdomen: Soft, non-tender, non-distended   EXT: No gross deformities, normal ROM.  SKIN: Warm and dry  NEURO: Alert, oriented, motor and sensory grossly intact  PSYCH: Calm, cooperative    of note, before rectal exam pt had bowel movement and felt much better

## 2025-07-30 NOTE — ED PROVIDER NOTE - CLINICAL SUMMARY MEDICAL DECISION MAKING FREE TEXT BOX
89-year-old male here for evaluation of constipation, patient Member last bowel movement states he has stool stuck at the rectum today causing discomfort which prompted visit. exam normal. pt had BM himself and felt better  dc with stool softener and return precautions

## 2025-07-30 NOTE — ED PROVIDER NOTE - OBJECTIVE STATEMENT
Patient is an 89-year-old male here for evaluation of constipation, patient Member last bowel movement states he has stool stuck at the rectum today causing discomfort which prompted visit.  Patient denies fever, chills, abdominal pain, vomiting, diarrhea

## 2025-07-30 NOTE — ED ADULT NURSE NOTE - NSFALLUNIVINTERV_ED_ALL_ED
Bed/Stretcher in lowest position, wheels locked, appropriate side rails in place/Call bell, personal items and telephone in reach/Instruct patient to call for assistance before getting out of bed/chair/stretcher/Non-slip footwear applied when patient is off stretcher/[place] to call system/Physically safe environment - no spills, clutter or unnecessary equipment/Purposeful proactive rounding/Room/bathroom lighting operational, light cord in reach

## 2025-07-30 NOTE — ED PROVIDER NOTE - PHYSICAL EXAMINATION
VITAL SIGNS: I have reviewed nursing notes and confirm.  CONSTITUTIONAL: Well-developed; well-nourished; in no acute distress.   SKIN: skin exam is warm and dry, no acute rash.    HEAD: Normocephalic; atraumatic.  EYES:conjunctiva and sclera clear.  ENT: No nasal discharge; airway clear.  CARD: S1, S2 normal; no murmurs, gallops, or rubs. Regular rate and rhythm.   RESP: No wheezes, rales or rhonchi.  ABD: Normal bowel sounds; soft; non-distended; non-tender  EXT: Normal ROM.  No clubbing, cyanosis or edema.   NEURO: Alert, oriented, grossly unremarkable

## 2025-07-30 NOTE — ED PROVIDER NOTE - PATIENT PORTAL LINK FT
You can access the FollowMyHealth Patient Portal offered by [facility] by registering at the following website: [email or web address]. By joining [facility]’s FollowMyHealth portal, you will also be able to view your health information using other applications (apps) compatible with our system.

## 2025-08-14 ENCOUNTER — INPATIENT (INPATIENT)
Facility: HOSPITAL | Age: 89
LOS: 3 days | Discharge: HOME | DRG: 395 | End: 2025-08-18
Attending: INTERNAL MEDICINE | Admitting: INTERNAL MEDICINE
Payer: MEDICARE

## 2025-08-14 VITALS
RESPIRATION RATE: 17 BRPM | WEIGHT: 139.99 LBS | OXYGEN SATURATION: 99 % | DIASTOLIC BLOOD PRESSURE: 68 MMHG | SYSTOLIC BLOOD PRESSURE: 151 MMHG | HEART RATE: 78 BPM | TEMPERATURE: 97 F

## 2025-08-14 DIAGNOSIS — T18.128A FOOD IN ESOPHAGUS CAUSING OTHER INJURY, INITIAL ENCOUNTER: ICD-10-CM

## 2025-08-14 DIAGNOSIS — Z90.49 ACQUIRED ABSENCE OF OTHER SPECIFIED PARTS OF DIGESTIVE TRACT: Chronic | ICD-10-CM

## 2025-08-14 LAB
ALBUMIN SERPL ELPH-MCNC: 3.8 G/DL — SIGNIFICANT CHANGE UP (ref 3.5–5.2)
ALP SERPL-CCNC: 84 U/L — SIGNIFICANT CHANGE UP (ref 30–115)
ALT FLD-CCNC: 14 U/L — SIGNIFICANT CHANGE UP (ref 0–41)
ANION GAP SERPL CALC-SCNC: 11 MMOL/L — SIGNIFICANT CHANGE UP (ref 7–14)
AST SERPL-CCNC: 21 U/L — SIGNIFICANT CHANGE UP (ref 0–41)
BASOPHILS # BLD AUTO: 0.04 K/UL — SIGNIFICANT CHANGE UP (ref 0–0.2)
BASOPHILS NFR BLD AUTO: 0.6 % — SIGNIFICANT CHANGE UP (ref 0–2)
BILIRUB SERPL-MCNC: 0.4 MG/DL — SIGNIFICANT CHANGE UP (ref 0.2–1.2)
BUN SERPL-MCNC: 25 MG/DL — HIGH (ref 10–20)
CALCIUM SERPL-MCNC: 8.8 MG/DL — SIGNIFICANT CHANGE UP (ref 8.4–10.5)
CHLORIDE SERPL-SCNC: 102 MMOL/L — SIGNIFICANT CHANGE UP (ref 98–110)
CO2 SERPL-SCNC: 23 MMOL/L — SIGNIFICANT CHANGE UP (ref 17–32)
CREAT SERPL-MCNC: 1.5 MG/DL — SIGNIFICANT CHANGE UP (ref 0.7–1.5)
EGFR: 43 ML/MIN/1.73M2 — LOW
EGFR: 43 ML/MIN/1.73M2 — LOW
EOSINOPHIL # BLD AUTO: 0.37 K/UL — SIGNIFICANT CHANGE UP (ref 0–0.5)
EOSINOPHIL NFR BLD AUTO: 5.3 % — SIGNIFICANT CHANGE UP (ref 0–6)
GLUCOSE SERPL-MCNC: 103 MG/DL — HIGH (ref 70–99)
HCT VFR BLD CALC: 34.2 % — LOW (ref 39–50)
HGB BLD-MCNC: 11.5 G/DL — LOW (ref 13–17)
IMM GRANULOCYTES # BLD AUTO: 0.02 K/UL — SIGNIFICANT CHANGE UP (ref 0–0.07)
IMM GRANULOCYTES NFR BLD AUTO: 0.3 % — SIGNIFICANT CHANGE UP (ref 0–0.9)
LYMPHOCYTES # BLD AUTO: 1.04 K/UL — SIGNIFICANT CHANGE UP (ref 1–3.3)
LYMPHOCYTES NFR BLD AUTO: 14.9 % — SIGNIFICANT CHANGE UP (ref 13–44)
MCHC RBC-ENTMCNC: 30.5 PG — SIGNIFICANT CHANGE UP (ref 27–34)
MCHC RBC-ENTMCNC: 33.6 G/DL — SIGNIFICANT CHANGE UP (ref 32–36)
MCV RBC AUTO: 90.7 FL — SIGNIFICANT CHANGE UP (ref 80–100)
MONOCYTES # BLD AUTO: 0.71 K/UL — SIGNIFICANT CHANGE UP (ref 0–0.9)
MONOCYTES NFR BLD AUTO: 10.2 % — SIGNIFICANT CHANGE UP (ref 2–14)
NEUTROPHILS # BLD AUTO: 4.79 K/UL — SIGNIFICANT CHANGE UP (ref 1.8–7.4)
NEUTROPHILS NFR BLD AUTO: 68.7 % — SIGNIFICANT CHANGE UP (ref 43–77)
NRBC # BLD AUTO: 0 K/UL — SIGNIFICANT CHANGE UP (ref 0–0)
NRBC # FLD: 0 K/UL — SIGNIFICANT CHANGE UP (ref 0–0)
NRBC BLD AUTO-RTO: 0 /100 WBCS — SIGNIFICANT CHANGE UP (ref 0–0)
PLATELET # BLD AUTO: 141 K/UL — LOW (ref 150–400)
PMV BLD: 10.1 FL — SIGNIFICANT CHANGE UP (ref 7–13)
POTASSIUM SERPL-MCNC: 4.4 MMOL/L — SIGNIFICANT CHANGE UP (ref 3.5–5)
POTASSIUM SERPL-SCNC: 4.4 MMOL/L — SIGNIFICANT CHANGE UP (ref 3.5–5)
PROT SERPL-MCNC: 6 G/DL — SIGNIFICANT CHANGE UP (ref 6–8)
RBC # BLD: 3.77 M/UL — LOW (ref 4.2–5.8)
RBC # FLD: 13 % — SIGNIFICANT CHANGE UP (ref 10.3–14.5)
SODIUM SERPL-SCNC: 136 MMOL/L — SIGNIFICANT CHANGE UP (ref 135–146)
WBC # BLD: 6.97 K/UL — SIGNIFICANT CHANGE UP (ref 3.8–10.5)
WBC # FLD AUTO: 6.97 K/UL — SIGNIFICANT CHANGE UP (ref 3.8–10.5)

## 2025-08-14 PROCEDURE — C1874: CPT

## 2025-08-14 PROCEDURE — 80053 COMPREHEN METABOLIC PANEL: CPT

## 2025-08-14 PROCEDURE — 88341 IMHCHEM/IMCYTCHM EA ADD ANTB: CPT

## 2025-08-14 PROCEDURE — 85027 COMPLETE CBC AUTOMATED: CPT

## 2025-08-14 PROCEDURE — 88305 TISSUE EXAM BY PATHOLOGIST: CPT

## 2025-08-14 PROCEDURE — 81003 URINALYSIS AUTO W/O SCOPE: CPT

## 2025-08-14 PROCEDURE — C9399: CPT

## 2025-08-14 PROCEDURE — C1769: CPT

## 2025-08-14 PROCEDURE — 80048 BASIC METABOLIC PNL TOTAL CA: CPT

## 2025-08-14 PROCEDURE — 36415 COLL VENOUS BLD VENIPUNCTURE: CPT

## 2025-08-14 PROCEDURE — 71260 CT THORAX DX C+: CPT | Mod: 26

## 2025-08-14 PROCEDURE — 88342 IMHCHEM/IMCYTCHM 1ST ANTB: CPT

## 2025-08-14 PROCEDURE — 74018 RADEX ABDOMEN 1 VIEW: CPT

## 2025-08-14 PROCEDURE — 85025 COMPLETE CBC W/AUTO DIFF WBC: CPT

## 2025-08-14 PROCEDURE — 99285 EMERGENCY DEPT VISIT HI MDM: CPT

## 2025-08-14 RX ORDER — SOLIFENACIN SUCCIATE 5 MG/1
1 TABLET, FILM COATED ORAL
Refills: 0 | DISCHARGE

## 2025-08-14 RX ORDER — MAGNESIUM, ALUMINUM HYDROXIDE 200-200 MG
30 TABLET,CHEWABLE ORAL EVERY 4 HOURS
Refills: 0 | Status: DISCONTINUED | OUTPATIENT
Start: 2025-08-14 | End: 2025-08-18

## 2025-08-14 RX ORDER — TAMSULOSIN HYDROCHLORIDE 0.4 MG/1
0.4 CAPSULE ORAL AT BEDTIME
Refills: 0 | Status: DISCONTINUED | OUTPATIENT
Start: 2025-08-14 | End: 2025-08-18

## 2025-08-14 RX ORDER — GLUCAGON 3 MG/1
1 POWDER NASAL ONCE
Refills: 0 | Status: COMPLETED | OUTPATIENT
Start: 2025-08-14 | End: 2025-08-14

## 2025-08-14 RX ORDER — ONDANSETRON HCL/PF 4 MG/2 ML
4 VIAL (ML) INJECTION EVERY 8 HOURS
Refills: 0 | Status: DISCONTINUED | OUTPATIENT
Start: 2025-08-14 | End: 2025-08-18

## 2025-08-14 RX ORDER — POLYETHYLENE GLYCOL 3350 17 G/17G
17 POWDER, FOR SOLUTION ORAL DAILY
Refills: 0 | Status: DISCONTINUED | OUTPATIENT
Start: 2025-08-14 | End: 2025-08-18

## 2025-08-14 RX ORDER — SODIUM CHLORIDE 9 G/1000ML
1000 INJECTION, SOLUTION INTRAVENOUS
Refills: 0 | Status: DISCONTINUED | OUTPATIENT
Start: 2025-08-14 | End: 2025-08-17

## 2025-08-14 RX ORDER — LEVOTHYROXINE SODIUM 300 MCG
75 TABLET ORAL DAILY
Refills: 0 | Status: DISCONTINUED | OUTPATIENT
Start: 2025-08-14 | End: 2025-08-15

## 2025-08-14 RX ORDER — OXYBUTYNIN CHLORIDE 5 MG/1
5 TABLET, FILM COATED, EXTENDED RELEASE ORAL DAILY
Refills: 0 | Status: DISCONTINUED | OUTPATIENT
Start: 2025-08-14 | End: 2025-08-18

## 2025-08-14 RX ORDER — ACETAMINOPHEN 500 MG/5ML
650 LIQUID (ML) ORAL EVERY 6 HOURS
Refills: 0 | Status: DISCONTINUED | OUTPATIENT
Start: 2025-08-14 | End: 2025-08-18

## 2025-08-14 RX ORDER — HEPARIN SODIUM 1000 [USP'U]/ML
5000 INJECTION INTRAVENOUS; SUBCUTANEOUS EVERY 8 HOURS
Refills: 0 | Status: DISCONTINUED | OUTPATIENT
Start: 2025-08-14 | End: 2025-08-18

## 2025-08-14 RX ADMIN — SODIUM CHLORIDE 50 MILLILITER(S): 9 INJECTION, SOLUTION INTRAVENOUS at 23:52

## 2025-08-14 RX ADMIN — GLUCAGON 1 MILLIGRAM(S): 3 POWDER NASAL at 20:29

## 2025-08-14 RX ADMIN — GLUCAGON 1 MILLIGRAM(S): 3 POWDER NASAL at 21:19

## 2025-08-15 LAB
ALBUMIN SERPL ELPH-MCNC: 3.7 G/DL — SIGNIFICANT CHANGE UP (ref 3.5–5.2)
ALP SERPL-CCNC: 93 U/L — SIGNIFICANT CHANGE UP (ref 30–115)
ALT FLD-CCNC: 17 U/L — SIGNIFICANT CHANGE UP (ref 0–41)
ANION GAP SERPL CALC-SCNC: 14 MMOL/L — SIGNIFICANT CHANGE UP (ref 7–14)
AST SERPL-CCNC: 22 U/L — SIGNIFICANT CHANGE UP (ref 0–41)
BILIRUB SERPL-MCNC: 0.8 MG/DL — SIGNIFICANT CHANGE UP (ref 0.2–1.2)
BUN SERPL-MCNC: 23 MG/DL — HIGH (ref 10–20)
CALCIUM SERPL-MCNC: 9.1 MG/DL — SIGNIFICANT CHANGE UP (ref 8.4–10.5)
CHLORIDE SERPL-SCNC: 104 MMOL/L — SIGNIFICANT CHANGE UP (ref 98–110)
CO2 SERPL-SCNC: 22 MMOL/L — SIGNIFICANT CHANGE UP (ref 17–32)
CREAT SERPL-MCNC: 1.4 MG/DL — SIGNIFICANT CHANGE UP (ref 0.7–1.5)
EGFR: 47 ML/MIN/1.73M2 — LOW
EGFR: 47 ML/MIN/1.73M2 — LOW
GLUCOSE SERPL-MCNC: 71 MG/DL — SIGNIFICANT CHANGE UP (ref 70–99)
HCT VFR BLD CALC: 36.7 % — LOW (ref 39–50)
HGB BLD-MCNC: 12.6 G/DL — LOW (ref 13–17)
MCHC RBC-ENTMCNC: 31.1 PG — SIGNIFICANT CHANGE UP (ref 27–34)
MCHC RBC-ENTMCNC: 34.3 G/DL — SIGNIFICANT CHANGE UP (ref 32–36)
MCV RBC AUTO: 90.6 FL — SIGNIFICANT CHANGE UP (ref 80–100)
NRBC # BLD AUTO: 0 K/UL — SIGNIFICANT CHANGE UP (ref 0–0)
NRBC # FLD: 0 K/UL — SIGNIFICANT CHANGE UP (ref 0–0)
NRBC BLD AUTO-RTO: 0 /100 WBCS — SIGNIFICANT CHANGE UP (ref 0–0)
PLATELET # BLD AUTO: 154 K/UL — SIGNIFICANT CHANGE UP (ref 150–400)
PMV BLD: 11 FL — SIGNIFICANT CHANGE UP (ref 7–13)
POTASSIUM SERPL-MCNC: 4.1 MMOL/L — SIGNIFICANT CHANGE UP (ref 3.5–5)
POTASSIUM SERPL-SCNC: 4.1 MMOL/L — SIGNIFICANT CHANGE UP (ref 3.5–5)
PROT SERPL-MCNC: 6.2 G/DL — SIGNIFICANT CHANGE UP (ref 6–8)
RBC # BLD: 4.05 M/UL — LOW (ref 4.2–5.8)
RBC # FLD: 12.8 % — SIGNIFICANT CHANGE UP (ref 10.3–14.5)
SODIUM SERPL-SCNC: 140 MMOL/L — SIGNIFICANT CHANGE UP (ref 135–146)
WBC # BLD: 8.32 K/UL — SIGNIFICANT CHANGE UP (ref 3.8–10.5)
WBC # FLD AUTO: 8.32 K/UL — SIGNIFICANT CHANGE UP (ref 3.8–10.5)

## 2025-08-15 PROCEDURE — 99222 1ST HOSP IP/OBS MODERATE 55: CPT

## 2025-08-15 PROCEDURE — 88313 SPECIAL STAINS GROUP 2: CPT | Mod: 26

## 2025-08-15 PROCEDURE — 88305 TISSUE EXAM BY PATHOLOGIST: CPT | Mod: 26

## 2025-08-15 PROCEDURE — 88342 IMHCHEM/IMCYTCHM 1ST ANTB: CPT | Mod: 26

## 2025-08-15 RX ORDER — LEVOTHYROXINE SODIUM 300 MCG
60 TABLET ORAL AT BEDTIME
Refills: 0 | Status: DISCONTINUED | OUTPATIENT
Start: 2025-08-15 | End: 2025-08-15

## 2025-08-15 RX ORDER — LEVOTHYROXINE SODIUM 300 MCG
60 TABLET ORAL AT BEDTIME
Refills: 0 | Status: COMPLETED | OUTPATIENT
Start: 2025-08-15 | End: 2025-08-17

## 2025-08-15 RX ORDER — LEVOTHYROXINE SODIUM 300 MCG
75 TABLET ORAL DAILY
Refills: 0 | Status: DISCONTINUED | OUTPATIENT
Start: 2025-08-15 | End: 2025-08-15

## 2025-08-15 RX ADMIN — Medication 650 MILLIGRAM(S): at 22:15

## 2025-08-15 RX ADMIN — Medication 60 MICROGRAM(S): at 22:16

## 2025-08-15 RX ADMIN — HEPARIN SODIUM 5000 UNIT(S): 1000 INJECTION INTRAVENOUS; SUBCUTANEOUS at 21:32

## 2025-08-15 RX ADMIN — TAMSULOSIN HYDROCHLORIDE 0.4 MILLIGRAM(S): 0.4 CAPSULE ORAL at 21:32

## 2025-08-15 RX ADMIN — HEPARIN SODIUM 5000 UNIT(S): 1000 INJECTION INTRAVENOUS; SUBCUTANEOUS at 05:45

## 2025-08-15 RX ADMIN — Medication 1 APPLICATION(S): at 12:49

## 2025-08-15 RX ADMIN — Medication 650 MILLIGRAM(S): at 23:09

## 2025-08-15 RX ADMIN — Medication 40 MILLIGRAM(S): at 12:48

## 2025-08-16 LAB
ANION GAP SERPL CALC-SCNC: 19 MMOL/L — HIGH (ref 7–14)
BUN SERPL-MCNC: 26 MG/DL — HIGH (ref 10–20)
CALCIUM SERPL-MCNC: 9.3 MG/DL — SIGNIFICANT CHANGE UP (ref 8.4–10.5)
CHLORIDE SERPL-SCNC: 102 MMOL/L — SIGNIFICANT CHANGE UP (ref 98–110)
CO2 SERPL-SCNC: 18 MMOL/L — SIGNIFICANT CHANGE UP (ref 17–32)
CREAT SERPL-MCNC: 1.6 MG/DL — HIGH (ref 0.7–1.5)
EGFR: 40 ML/MIN/1.73M2 — LOW
EGFR: 40 ML/MIN/1.73M2 — LOW
GLUCOSE SERPL-MCNC: 68 MG/DL — LOW (ref 70–99)
HCT VFR BLD CALC: 38.9 % — LOW (ref 39–50)
HGB BLD-MCNC: 13.1 G/DL — SIGNIFICANT CHANGE UP (ref 13–17)
MCHC RBC-ENTMCNC: 30.8 PG — SIGNIFICANT CHANGE UP (ref 27–34)
MCHC RBC-ENTMCNC: 33.7 G/DL — SIGNIFICANT CHANGE UP (ref 32–36)
MCV RBC AUTO: 91.3 FL — SIGNIFICANT CHANGE UP (ref 80–100)
NRBC # BLD AUTO: 0 K/UL — SIGNIFICANT CHANGE UP (ref 0–0)
NRBC # FLD: 0 K/UL — SIGNIFICANT CHANGE UP (ref 0–0)
NRBC BLD AUTO-RTO: 0 /100 WBCS — SIGNIFICANT CHANGE UP (ref 0–0)
PLATELET # BLD AUTO: 141 K/UL — LOW (ref 150–400)
PMV BLD: 11.4 FL — SIGNIFICANT CHANGE UP (ref 7–13)
POTASSIUM SERPL-MCNC: 5.6 MMOL/L — HIGH (ref 3.5–5)
POTASSIUM SERPL-SCNC: 5.6 MMOL/L — HIGH (ref 3.5–5)
RBC # BLD: 4.26 M/UL — SIGNIFICANT CHANGE UP (ref 4.2–5.8)
RBC # FLD: 12.9 % — SIGNIFICANT CHANGE UP (ref 10.3–14.5)
SODIUM SERPL-SCNC: 139 MMOL/L — SIGNIFICANT CHANGE UP (ref 135–146)
WBC # BLD: 9.89 K/UL — SIGNIFICANT CHANGE UP (ref 3.8–10.5)
WBC # FLD AUTO: 9.89 K/UL — SIGNIFICANT CHANGE UP (ref 3.8–10.5)

## 2025-08-16 PROCEDURE — 43239 EGD BIOPSY SINGLE/MULTIPLE: CPT | Mod: XU

## 2025-08-16 PROCEDURE — 43266 EGD ENDOSCOPIC STENT PLACE: CPT

## 2025-08-16 RX ORDER — TRAMADOL HYDROCHLORIDE 50 MG/1
25 TABLET, FILM COATED ORAL ONCE
Refills: 0 | Status: DISCONTINUED | OUTPATIENT
Start: 2025-08-16 | End: 2025-08-16

## 2025-08-16 RX ADMIN — Medication 1 APPLICATION(S): at 11:57

## 2025-08-16 RX ADMIN — TAMSULOSIN HYDROCHLORIDE 0.4 MILLIGRAM(S): 0.4 CAPSULE ORAL at 21:30

## 2025-08-16 RX ADMIN — TRAMADOL HYDROCHLORIDE 25 MILLIGRAM(S): 50 TABLET, FILM COATED ORAL at 00:16

## 2025-08-16 RX ADMIN — Medication 650 MILLIGRAM(S): at 13:35

## 2025-08-16 RX ADMIN — Medication 40 MILLIGRAM(S): at 11:56

## 2025-08-16 RX ADMIN — Medication 650 MILLIGRAM(S): at 14:05

## 2025-08-16 RX ADMIN — TRAMADOL HYDROCHLORIDE 25 MILLIGRAM(S): 50 TABLET, FILM COATED ORAL at 00:46

## 2025-08-16 RX ADMIN — OXYBUTYNIN CHLORIDE 5 MILLIGRAM(S): 5 TABLET, FILM COATED, EXTENDED RELEASE ORAL at 11:56

## 2025-08-16 RX ADMIN — HEPARIN SODIUM 5000 UNIT(S): 1000 INJECTION INTRAVENOUS; SUBCUTANEOUS at 21:30

## 2025-08-16 RX ADMIN — HEPARIN SODIUM 5000 UNIT(S): 1000 INJECTION INTRAVENOUS; SUBCUTANEOUS at 05:09

## 2025-08-16 RX ADMIN — Medication 60 MICROGRAM(S): at 21:30

## 2025-08-16 RX ADMIN — HEPARIN SODIUM 5000 UNIT(S): 1000 INJECTION INTRAVENOUS; SUBCUTANEOUS at 13:34

## 2025-08-17 LAB
ANION GAP SERPL CALC-SCNC: 13 MMOL/L — SIGNIFICANT CHANGE UP (ref 7–14)
APPEARANCE UR: CLEAR — SIGNIFICANT CHANGE UP
BILIRUB UR-MCNC: NEGATIVE — SIGNIFICANT CHANGE UP
BUN SERPL-MCNC: 25 MG/DL — HIGH (ref 10–20)
CALCIUM SERPL-MCNC: 9.5 MG/DL — SIGNIFICANT CHANGE UP (ref 8.4–10.5)
CHLORIDE SERPL-SCNC: 98 MMOL/L — SIGNIFICANT CHANGE UP (ref 98–110)
CO2 SERPL-SCNC: 24 MMOL/L — SIGNIFICANT CHANGE UP (ref 17–32)
COLOR SPEC: YELLOW — SIGNIFICANT CHANGE UP
CREAT SERPL-MCNC: 1.7 MG/DL — HIGH (ref 0.7–1.5)
DIFF PNL FLD: NEGATIVE — SIGNIFICANT CHANGE UP
EGFR: 37 ML/MIN/1.73M2 — LOW
EGFR: 37 ML/MIN/1.73M2 — LOW
GLUCOSE SERPL-MCNC: 122 MG/DL — HIGH (ref 70–99)
GLUCOSE UR QL: NEGATIVE MG/DL — SIGNIFICANT CHANGE UP
HCT VFR BLD CALC: 38.6 % — LOW (ref 39–50)
HGB BLD-MCNC: 12.8 G/DL — LOW (ref 13–17)
KETONES UR QL: NEGATIVE MG/DL — SIGNIFICANT CHANGE UP
LEUKOCYTE ESTERASE UR-ACNC: NEGATIVE — SIGNIFICANT CHANGE UP
MCHC RBC-ENTMCNC: 30.3 PG — SIGNIFICANT CHANGE UP (ref 27–34)
MCHC RBC-ENTMCNC: 33.2 G/DL — SIGNIFICANT CHANGE UP (ref 32–36)
MCV RBC AUTO: 91.5 FL — SIGNIFICANT CHANGE UP (ref 80–100)
NITRITE UR-MCNC: NEGATIVE — SIGNIFICANT CHANGE UP
NRBC # BLD AUTO: 0 K/UL — SIGNIFICANT CHANGE UP (ref 0–0)
NRBC # FLD: 0 K/UL — SIGNIFICANT CHANGE UP (ref 0–0)
NRBC BLD AUTO-RTO: 0 /100 WBCS — SIGNIFICANT CHANGE UP (ref 0–0)
PH UR: 6.5 — SIGNIFICANT CHANGE UP (ref 5–8)
PLATELET # BLD AUTO: 138 K/UL — LOW (ref 150–400)
PMV BLD: 11.3 FL — SIGNIFICANT CHANGE UP (ref 7–13)
POTASSIUM SERPL-MCNC: 5 MMOL/L — SIGNIFICANT CHANGE UP (ref 3.5–5)
POTASSIUM SERPL-SCNC: 5 MMOL/L — SIGNIFICANT CHANGE UP (ref 3.5–5)
PROT UR-MCNC: NEGATIVE MG/DL — SIGNIFICANT CHANGE UP
RBC # BLD: 4.22 M/UL — SIGNIFICANT CHANGE UP (ref 4.2–5.8)
RBC # FLD: 12.8 % — SIGNIFICANT CHANGE UP (ref 10.3–14.5)
SODIUM SERPL-SCNC: 135 MMOL/L — SIGNIFICANT CHANGE UP (ref 135–146)
SP GR SPEC: 1.01 — SIGNIFICANT CHANGE UP (ref 1–1.03)
UROBILINOGEN FLD QL: 0.2 MG/DL — SIGNIFICANT CHANGE UP (ref 0.2–1)
WBC # BLD: 11.56 K/UL — HIGH (ref 3.8–10.5)
WBC # FLD AUTO: 11.56 K/UL — HIGH (ref 3.8–10.5)

## 2025-08-17 RX ORDER — SODIUM CHLORIDE 9 G/1000ML
1000 INJECTION, SOLUTION INTRAVENOUS
Refills: 0 | Status: DISCONTINUED | OUTPATIENT
Start: 2025-08-17 | End: 2025-08-18

## 2025-08-17 RX ADMIN — OXYBUTYNIN CHLORIDE 5 MILLIGRAM(S): 5 TABLET, FILM COATED, EXTENDED RELEASE ORAL at 11:24

## 2025-08-17 RX ADMIN — HEPARIN SODIUM 5000 UNIT(S): 1000 INJECTION INTRAVENOUS; SUBCUTANEOUS at 05:52

## 2025-08-17 RX ADMIN — Medication 40 MILLIGRAM(S): at 11:25

## 2025-08-17 RX ADMIN — Medication 1 APPLICATION(S): at 11:26

## 2025-08-17 RX ADMIN — TAMSULOSIN HYDROCHLORIDE 0.4 MILLIGRAM(S): 0.4 CAPSULE ORAL at 21:26

## 2025-08-17 RX ADMIN — Medication 60 MICROGRAM(S): at 21:26

## 2025-08-17 RX ADMIN — HEPARIN SODIUM 5000 UNIT(S): 1000 INJECTION INTRAVENOUS; SUBCUTANEOUS at 21:26

## 2025-08-17 RX ADMIN — HEPARIN SODIUM 5000 UNIT(S): 1000 INJECTION INTRAVENOUS; SUBCUTANEOUS at 13:01

## 2025-08-18 ENCOUNTER — TRANSCRIPTION ENCOUNTER (OUTPATIENT)
Age: 89
End: 2025-08-18

## 2025-08-18 VITALS
SYSTOLIC BLOOD PRESSURE: 102 MMHG | HEART RATE: 75 BPM | RESPIRATION RATE: 16 BRPM | DIASTOLIC BLOOD PRESSURE: 61 MMHG | TEMPERATURE: 98 F | OXYGEN SATURATION: 96 %

## 2025-08-18 LAB
ALBUMIN SERPL ELPH-MCNC: 4 G/DL — SIGNIFICANT CHANGE UP (ref 3.5–5.2)
ALP SERPL-CCNC: 111 U/L — SIGNIFICANT CHANGE UP (ref 30–115)
ALT FLD-CCNC: 27 U/L — SIGNIFICANT CHANGE UP (ref 0–41)
ANION GAP SERPL CALC-SCNC: 14 MMOL/L — SIGNIFICANT CHANGE UP (ref 7–14)
AST SERPL-CCNC: 43 U/L — HIGH (ref 0–41)
BASOPHILS # BLD AUTO: 0.03 K/UL — SIGNIFICANT CHANGE UP (ref 0–0.2)
BASOPHILS NFR BLD AUTO: 0.3 % — SIGNIFICANT CHANGE UP (ref 0–2)
BILIRUB SERPL-MCNC: 1.2 MG/DL — SIGNIFICANT CHANGE UP (ref 0.2–1.2)
BUN SERPL-MCNC: 20 MG/DL — SIGNIFICANT CHANGE UP (ref 10–20)
CALCIUM SERPL-MCNC: 9.2 MG/DL — SIGNIFICANT CHANGE UP (ref 8.4–10.5)
CHLORIDE SERPL-SCNC: 101 MMOL/L — SIGNIFICANT CHANGE UP (ref 98–110)
CO2 SERPL-SCNC: 24 MMOL/L — SIGNIFICANT CHANGE UP (ref 17–32)
CREAT SERPL-MCNC: 1.6 MG/DL — HIGH (ref 0.7–1.5)
EGFR: 40 ML/MIN/1.73M2 — LOW
EGFR: 40 ML/MIN/1.73M2 — LOW
EOSINOPHIL # BLD AUTO: 0.18 K/UL — SIGNIFICANT CHANGE UP (ref 0–0.5)
EOSINOPHIL NFR BLD AUTO: 1.9 % — SIGNIFICANT CHANGE UP (ref 0–6)
GLUCOSE SERPL-MCNC: 106 MG/DL — HIGH (ref 70–99)
HCT VFR BLD CALC: 37.2 % — LOW (ref 39–50)
HGB BLD-MCNC: 12.4 G/DL — LOW (ref 13–17)
IMM GRANULOCYTES # BLD AUTO: 0.05 K/UL — SIGNIFICANT CHANGE UP (ref 0–0.07)
IMM GRANULOCYTES NFR BLD AUTO: 0.5 % — SIGNIFICANT CHANGE UP (ref 0–0.9)
LYMPHOCYTES # BLD AUTO: 0.94 K/UL — LOW (ref 1–3.3)
LYMPHOCYTES NFR BLD AUTO: 10.1 % — LOW (ref 13–44)
MCHC RBC-ENTMCNC: 30.1 PG — SIGNIFICANT CHANGE UP (ref 27–34)
MCHC RBC-ENTMCNC: 33.3 G/DL — SIGNIFICANT CHANGE UP (ref 32–36)
MCV RBC AUTO: 90.3 FL — SIGNIFICANT CHANGE UP (ref 80–100)
MONOCYTES # BLD AUTO: 1.05 K/UL — HIGH (ref 0–0.9)
MONOCYTES NFR BLD AUTO: 11.2 % — SIGNIFICANT CHANGE UP (ref 2–14)
NEUTROPHILS # BLD AUTO: 7.09 K/UL — SIGNIFICANT CHANGE UP (ref 1.8–7.4)
NEUTROPHILS NFR BLD AUTO: 76 % — SIGNIFICANT CHANGE UP (ref 43–77)
NRBC # BLD AUTO: 0 K/UL — SIGNIFICANT CHANGE UP (ref 0–0)
NRBC # FLD: 0 K/UL — SIGNIFICANT CHANGE UP (ref 0–0)
NRBC BLD AUTO-RTO: 0 /100 WBCS — SIGNIFICANT CHANGE UP (ref 0–0)
PLATELET # BLD AUTO: 149 K/UL — LOW (ref 150–400)
PMV BLD: 11.1 FL — SIGNIFICANT CHANGE UP (ref 7–13)
POTASSIUM SERPL-MCNC: 4.3 MMOL/L — SIGNIFICANT CHANGE UP (ref 3.5–5)
POTASSIUM SERPL-SCNC: 4.3 MMOL/L — SIGNIFICANT CHANGE UP (ref 3.5–5)
PROT SERPL-MCNC: 6.2 G/DL — SIGNIFICANT CHANGE UP (ref 6–8)
RBC # BLD: 4.12 M/UL — LOW (ref 4.2–5.8)
RBC # FLD: 12.8 % — SIGNIFICANT CHANGE UP (ref 10.3–14.5)
SODIUM SERPL-SCNC: 139 MMOL/L — SIGNIFICANT CHANGE UP (ref 135–146)
WBC # BLD: 9.34 K/UL — SIGNIFICANT CHANGE UP (ref 3.8–10.5)
WBC # FLD AUTO: 9.34 K/UL — SIGNIFICANT CHANGE UP (ref 3.8–10.5)

## 2025-08-18 PROCEDURE — 99233 SBSQ HOSP IP/OBS HIGH 50: CPT

## 2025-08-18 RX ADMIN — Medication 1 APPLICATION(S): at 13:40

## 2025-08-18 RX ADMIN — HEPARIN SODIUM 5000 UNIT(S): 1000 INJECTION INTRAVENOUS; SUBCUTANEOUS at 05:13

## 2025-08-18 RX ADMIN — HEPARIN SODIUM 5000 UNIT(S): 1000 INJECTION INTRAVENOUS; SUBCUTANEOUS at 13:40

## 2025-08-18 RX ADMIN — OXYBUTYNIN CHLORIDE 5 MILLIGRAM(S): 5 TABLET, FILM COATED, EXTENDED RELEASE ORAL at 12:31

## 2025-08-18 RX ADMIN — Medication 40 MILLIGRAM(S): at 12:31

## 2025-08-18 RX ADMIN — Medication 650 MILLIGRAM(S): at 03:34

## 2025-08-27 PROBLEM — Z87.898 PERSONAL HISTORY OF OTHER SPECIFIED CONDITIONS: Chronic | Status: ACTIVE | Noted: 2025-08-14

## 2025-08-27 PROBLEM — T18.128A FOOD IN ESOPHAGUS CAUSING OTHER INJURY, INITIAL ENCOUNTER: Chronic | Status: ACTIVE | Noted: 2025-08-14

## 2025-08-28 DIAGNOSIS — R13.10 DYSPHAGIA, UNSPECIFIED: ICD-10-CM

## 2025-08-28 DIAGNOSIS — D72.829 ELEVATED WHITE BLOOD CELL COUNT, UNSPECIFIED: ICD-10-CM

## 2025-08-28 DIAGNOSIS — K22.2 ESOPHAGEAL OBSTRUCTION: ICD-10-CM

## 2025-08-28 DIAGNOSIS — Z51.5 ENCOUNTER FOR PALLIATIVE CARE: ICD-10-CM

## 2025-08-28 DIAGNOSIS — K21.9 GASTRO-ESOPHAGEAL REFLUX DISEASE WITHOUT ESOPHAGITIS: ICD-10-CM

## 2025-08-28 DIAGNOSIS — C15.5 MALIGNANT NEOPLASM OF LOWER THIRD OF ESOPHAGUS: ICD-10-CM

## 2025-08-28 DIAGNOSIS — E03.9 HYPOTHYROIDISM, UNSPECIFIED: ICD-10-CM

## 2025-08-28 DIAGNOSIS — K44.9 DIAPHRAGMATIC HERNIA WITHOUT OBSTRUCTION OR GANGRENE: ICD-10-CM

## 2025-08-28 DIAGNOSIS — N40.0 BENIGN PROSTATIC HYPERPLASIA WITHOUT LOWER URINARY TRACT SYMPTOMS: ICD-10-CM

## 2025-09-02 ENCOUNTER — APPOINTMENT (OUTPATIENT)
Age: 89
End: 2025-09-02
Payer: MEDICARE

## 2025-09-02 VITALS
BODY MASS INDEX: 22.76 KG/M2 | HEART RATE: 71 BPM | SYSTOLIC BLOOD PRESSURE: 106 MMHG | TEMPERATURE: 97.6 F | DIASTOLIC BLOOD PRESSURE: 70 MMHG | WEIGHT: 145 LBS | HEIGHT: 67 IN

## 2025-09-02 DIAGNOSIS — C15.9 MALIGNANT NEOPLASM OF ESOPHAGUS, UNSPECIFIED: ICD-10-CM

## 2025-09-02 PROCEDURE — 99205 OFFICE O/P NEW HI 60 MIN: CPT

## 2025-09-03 ENCOUNTER — NON-APPOINTMENT (OUTPATIENT)
Age: 89
End: 2025-09-03

## 2025-09-14 ENCOUNTER — EMERGENCY (EMERGENCY)
Facility: HOSPITAL | Age: 89
LOS: 0 days | Discharge: HOME | End: 2025-09-15
Attending: EMERGENCY MEDICINE
Payer: MEDICARE

## 2025-09-14 VITALS
TEMPERATURE: 97 F | RESPIRATION RATE: 18 BRPM | OXYGEN SATURATION: 99 % | WEIGHT: 139.99 LBS | HEIGHT: 67 IN | DIASTOLIC BLOOD PRESSURE: 68 MMHG | HEART RATE: 71 BPM | SYSTOLIC BLOOD PRESSURE: 116 MMHG

## 2025-09-14 DIAGNOSIS — M54.89 OTHER DORSALGIA: ICD-10-CM
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
## 2025-09-14 DIAGNOSIS — Z90.49 ACQUIRED ABSENCE OF OTHER SPECIFIED PARTS OF DIGESTIVE TRACT: Chronic | ICD-10-CM
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
## 2025-09-14 DIAGNOSIS — R07.81 PLEURODYNIA: ICD-10-CM

## 2025-09-14 DIAGNOSIS — M54.9 DORSALGIA, UNSPECIFIED: ICD-10-CM

## 2025-09-14 DIAGNOSIS — R07.89 OTHER CHEST PAIN: ICD-10-CM

## 2025-09-14 LAB
ALBUMIN SERPL ELPH-MCNC: 3.9 G/DL — SIGNIFICANT CHANGE UP (ref 3.5–5.2)
ALP SERPL-CCNC: 92 U/L — SIGNIFICANT CHANGE UP (ref 30–115)
ALT FLD-CCNC: 10 U/L — SIGNIFICANT CHANGE UP (ref 0–41)
ANION GAP SERPL CALC-SCNC: 15 MMOL/L — HIGH (ref 7–14)
APPEARANCE UR: CLEAR — SIGNIFICANT CHANGE UP
AST SERPL-CCNC: 26 U/L — SIGNIFICANT CHANGE UP (ref 0–41)
BACTERIA # UR AUTO: ABNORMAL /HPF
BASOPHILS # BLD AUTO: 0.04 K/UL — SIGNIFICANT CHANGE UP (ref 0–0.2)
BASOPHILS NFR BLD AUTO: 0.4 % — SIGNIFICANT CHANGE UP (ref 0–2)
BILIRUB SERPL-MCNC: 0.5 MG/DL — SIGNIFICANT CHANGE UP (ref 0.2–1.2)
BILIRUB UR-MCNC: NEGATIVE — SIGNIFICANT CHANGE UP
BUN SERPL-MCNC: 25 MG/DL — HIGH (ref 10–20)
CALCIUM SERPL-MCNC: 9.3 MG/DL — SIGNIFICANT CHANGE UP (ref 8.4–10.5)
CHLORIDE SERPL-SCNC: 103 MMOL/L — SIGNIFICANT CHANGE UP (ref 98–110)
CO2 SERPL-SCNC: 19 MMOL/L — SIGNIFICANT CHANGE UP (ref 17–32)
COLOR SPEC: YELLOW — SIGNIFICANT CHANGE UP
CREAT SERPL-MCNC: 1.5 MG/DL — SIGNIFICANT CHANGE UP (ref 0.7–1.5)
DIFF PNL FLD: NEGATIVE — SIGNIFICANT CHANGE UP
EGFR: 43 ML/MIN/1.73M2 — LOW
EGFR: 43 ML/MIN/1.73M2 — LOW
EOSINOPHIL # BLD AUTO: 0.49 K/UL — SIGNIFICANT CHANGE UP (ref 0–0.5)
EOSINOPHIL NFR BLD AUTO: 4.9 % — SIGNIFICANT CHANGE UP (ref 0–6)
EPI CELLS # UR: PRESENT
GLUCOSE SERPL-MCNC: 85 MG/DL — SIGNIFICANT CHANGE UP (ref 70–99)
GLUCOSE UR QL: NEGATIVE MG/DL — SIGNIFICANT CHANGE UP
HCT VFR BLD CALC: 37.6 % — LOW (ref 39–50)
HGB BLD-MCNC: 12.7 G/DL — LOW (ref 13–17)
IMM GRANULOCYTES # BLD AUTO: 0.04 K/UL — SIGNIFICANT CHANGE UP (ref 0–0.07)
IMM GRANULOCYTES NFR BLD AUTO: 0.4 % — SIGNIFICANT CHANGE UP (ref 0–0.9)
KETONES UR QL: NEGATIVE MG/DL — SIGNIFICANT CHANGE UP
LEUKOCYTE ESTERASE UR-ACNC: ABNORMAL
LYMPHOCYTES # BLD AUTO: 1.75 K/UL — SIGNIFICANT CHANGE UP (ref 1–3.3)
LYMPHOCYTES NFR BLD AUTO: 17.6 % — SIGNIFICANT CHANGE UP (ref 13–44)
MCHC RBC-ENTMCNC: 31 PG — SIGNIFICANT CHANGE UP (ref 27–34)
MCHC RBC-ENTMCNC: 33.8 G/DL — SIGNIFICANT CHANGE UP (ref 32–36)
MCV RBC AUTO: 91.7 FL — SIGNIFICANT CHANGE UP (ref 80–100)
MONOCYTES # BLD AUTO: 0.96 K/UL — HIGH (ref 0–0.9)
MONOCYTES NFR BLD AUTO: 9.6 % — SIGNIFICANT CHANGE UP (ref 2–14)
NEUTROPHILS # BLD AUTO: 6.67 K/UL — SIGNIFICANT CHANGE UP (ref 1.8–7.4)
NEUTROPHILS NFR BLD AUTO: 67.1 % — SIGNIFICANT CHANGE UP (ref 43–77)
NITRITE UR-MCNC: NEGATIVE — SIGNIFICANT CHANGE UP
NRBC # BLD AUTO: 0 K/UL — SIGNIFICANT CHANGE UP (ref 0–0)
NRBC # FLD: 0 K/UL — SIGNIFICANT CHANGE UP (ref 0–0)
NRBC BLD AUTO-RTO: 0 /100 WBCS — SIGNIFICANT CHANGE UP (ref 0–0)
PH UR: 5.5 — SIGNIFICANT CHANGE UP (ref 5–8)
PLATELET # BLD AUTO: 197 K/UL — SIGNIFICANT CHANGE UP (ref 150–400)
PMV BLD: 10.8 FL — SIGNIFICANT CHANGE UP (ref 7–13)
POTASSIUM SERPL-MCNC: 5 MMOL/L — SIGNIFICANT CHANGE UP (ref 3.5–5)
POTASSIUM SERPL-SCNC: 5 MMOL/L — SIGNIFICANT CHANGE UP (ref 3.5–5)
PROT SERPL-MCNC: 6.4 G/DL — SIGNIFICANT CHANGE UP (ref 6–8)
PROT UR-MCNC: NEGATIVE MG/DL — SIGNIFICANT CHANGE UP
RBC # BLD: 4.1 M/UL — LOW (ref 4.2–5.8)
RBC # FLD: 13.6 % — SIGNIFICANT CHANGE UP (ref 10.3–14.5)
RBC CASTS # UR COMP ASSIST: 1 /HPF — SIGNIFICANT CHANGE UP (ref 0–4)
SODIUM SERPL-SCNC: 137 MMOL/L — SIGNIFICANT CHANGE UP (ref 135–146)
SP GR SPEC: 1.02 — SIGNIFICANT CHANGE UP (ref 1–1.03)
UROBILINOGEN FLD QL: 0.2 MG/DL — SIGNIFICANT CHANGE UP (ref 0.2–1)
WBC # BLD: 9.95 K/UL — SIGNIFICANT CHANGE UP (ref 3.8–10.5)
WBC # FLD AUTO: 9.95 K/UL — SIGNIFICANT CHANGE UP (ref 3.8–10.5)
WBC UR QL: 5 /HPF — SIGNIFICANT CHANGE UP (ref 0–5)

## 2025-09-14 PROCEDURE — 99284 EMERGENCY DEPT VISIT MOD MDM: CPT | Mod: 25

## 2025-09-14 PROCEDURE — 36415 COLL VENOUS BLD VENIPUNCTURE: CPT

## 2025-09-14 PROCEDURE — 71260 CT THORAX DX C+: CPT | Mod: 26

## 2025-09-14 PROCEDURE — 87086 URINE CULTURE/COLONY COUNT: CPT

## 2025-09-14 PROCEDURE — 80053 COMPREHEN METABOLIC PANEL: CPT

## 2025-09-14 PROCEDURE — 99285 EMERGENCY DEPT VISIT HI MDM: CPT

## 2025-09-14 PROCEDURE — 81001 URINALYSIS AUTO W/SCOPE: CPT

## 2025-09-14 PROCEDURE — 71260 CT THORAX DX C+: CPT

## 2025-09-14 PROCEDURE — 85025 COMPLETE CBC W/AUTO DIFF WBC: CPT

## 2025-09-14 PROCEDURE — 71045 X-RAY EXAM CHEST 1 VIEW: CPT | Mod: 26

## 2025-09-14 PROCEDURE — 71045 X-RAY EXAM CHEST 1 VIEW: CPT

## 2025-09-15 ENCOUNTER — APPOINTMENT (OUTPATIENT)
Dept: GASTROENTEROLOGY | Facility: CLINIC | Age: 89
End: 2025-09-15

## 2025-09-15 VITALS — SYSTOLIC BLOOD PRESSURE: 136 MMHG | DIASTOLIC BLOOD PRESSURE: 65 MMHG | HEART RATE: 72 BPM

## 2025-09-15 LAB
CULTURE RESULTS: SIGNIFICANT CHANGE UP
SPECIMEN SOURCE: SIGNIFICANT CHANGE UP